# Patient Record
Sex: FEMALE | Race: WHITE | Employment: UNEMPLOYED | URBAN - METROPOLITAN AREA
[De-identification: names, ages, dates, MRNs, and addresses within clinical notes are randomized per-mention and may not be internally consistent; named-entity substitution may affect disease eponyms.]

---

## 2017-02-14 PROBLEM — C53.1 MALIGNANT NEOPLASM OF EXOCERVIX (HCC): Status: ACTIVE | Noted: 2017-02-14

## 2018-05-25 ENCOUNTER — HOSPITAL ENCOUNTER (OUTPATIENT)
Dept: PET IMAGING | Age: 58
Discharge: HOME | End: 2018-05-27
Payer: OTHER GOVERNMENT

## 2018-05-25 DIAGNOSIS — C53.1 MALIGNANT NEOPLASM OF EXOCERVIX (HCC): ICD-10-CM

## 2018-05-25 PROCEDURE — A9552 F18 FDG: HCPCS | Performed by: RADIOLOGY

## 2018-05-25 PROCEDURE — 3430000000 HC RX DIAGNOSTIC RADIOPHARMACEUTICAL: Performed by: RADIOLOGY

## 2018-05-25 RX ORDER — FLUDEOXYGLUCOSE F 18 200 MCI/ML
15 INJECTION, SOLUTION INTRAVENOUS
Status: COMPLETED | OUTPATIENT
Start: 2018-05-25 | End: 2018-05-25

## 2018-05-25 RX ADMIN — FLUDEOXYGLUCOSE F 18 15 MILLICURIE: 200 INJECTION, SOLUTION INTRAVENOUS at 09:28

## 2018-05-30 ENCOUNTER — HOSPITAL ENCOUNTER (OUTPATIENT)
Dept: PET IMAGING | Age: 58
Discharge: HOME | End: 2018-06-01
Payer: OTHER GOVERNMENT

## 2018-05-30 DIAGNOSIS — C53.9 MALIGNANT NEOPLASM OF CERVIX, UNSPECIFIED SITE (HCC): ICD-10-CM

## 2018-05-30 PROCEDURE — A9552 F18 FDG: HCPCS | Performed by: RADIOLOGY

## 2018-05-30 PROCEDURE — 3430000000 HC RX DIAGNOSTIC RADIOPHARMACEUTICAL: Performed by: RADIOLOGY

## 2018-05-30 PROCEDURE — 78815 PET IMAGE W/CT SKULL-THIGH: CPT

## 2018-05-30 RX ORDER — FLUDEOXYGLUCOSE F 18 200 MCI/ML
15 INJECTION, SOLUTION INTRAVENOUS
Status: COMPLETED | OUTPATIENT
Start: 2018-05-30 | End: 2018-05-30

## 2018-05-30 RX ADMIN — FLUDEOXYGLUCOSE F 18 15 MILLICURIE: 200 INJECTION, SOLUTION INTRAVENOUS at 09:10

## 2018-10-02 ENCOUNTER — HOSPITAL ENCOUNTER (EMERGENCY)
Age: 58
Discharge: HOME | End: 2018-10-02
Attending: EMERGENCY MEDICINE
Payer: OTHER GOVERNMENT

## 2018-10-02 VITALS
HEIGHT: 64 IN | TEMPERATURE: 98.6 F | OXYGEN SATURATION: 98 % | SYSTOLIC BLOOD PRESSURE: 137 MMHG | WEIGHT: 110 LBS | HEART RATE: 68 BPM | BODY MASS INDEX: 18.78 KG/M2 | DIASTOLIC BLOOD PRESSURE: 75 MMHG | RESPIRATION RATE: 14 BRPM

## 2018-10-02 DIAGNOSIS — C53.1 MALIGNANT NEOPLASM OF EXOCERVIX (HCC): ICD-10-CM

## 2018-10-02 DIAGNOSIS — G89.3 CANCER-RELATED PAIN: Primary | ICD-10-CM

## 2018-10-02 PROCEDURE — 99282 EMERGENCY DEPT VISIT SF MDM: CPT

## 2018-10-02 PROCEDURE — 96372 THER/PROPH/DIAG INJ SC/IM: CPT

## 2018-10-02 PROCEDURE — 6360000002 HC RX W HCPCS: Performed by: STUDENT IN AN ORGANIZED HEALTH CARE EDUCATION/TRAINING PROGRAM

## 2018-10-02 PROCEDURE — 94760 N-INVAS EAR/PLS OXIMETRY 1: CPT

## 2018-10-02 RX ORDER — HYDROCODONE BITARTRATE AND ACETAMINOPHEN 5; 325 MG/1; MG/1
1 TABLET ORAL EVERY 6 HOURS PRN
Qty: 12 TABLET | Refills: 0 | Status: SHIPPED | OUTPATIENT
Start: 2018-10-02 | End: 2018-10-05

## 2018-10-02 RX ADMIN — HYDROMORPHONE HYDROCHLORIDE 1 MG: 1 INJECTION, SOLUTION INTRAMUSCULAR; INTRAVENOUS; SUBCUTANEOUS at 18:13

## 2018-10-02 RX ADMIN — HYDROMORPHONE HYDROCHLORIDE 1 MG: 1 INJECTION, SOLUTION INTRAMUSCULAR; INTRAVENOUS; SUBCUTANEOUS at 16:25

## 2018-10-02 ASSESSMENT — ENCOUNTER SYMPTOMS
ABDOMINAL DISTENTION: 0
SINUS PRESSURE: 0
BLOOD IN STOOL: 0
SHORTNESS OF BREATH: 0
DIARRHEA: 0
COUGH: 0
EYE PAIN: 0
TROUBLE SWALLOWING: 0
EYE REDNESS: 0
CHEST TIGHTNESS: 0
WHEEZING: 0
PHOTOPHOBIA: 0
RHINORRHEA: 0
BACK PAIN: 1
ABDOMINAL PAIN: 1
CONSTIPATION: 0
NAUSEA: 0
VOMITING: 0
SORE THROAT: 0

## 2018-10-02 ASSESSMENT — PAIN SCALES - GENERAL: PAINLEVEL_OUTOF10: 10

## 2018-10-08 ENCOUNTER — HOSPITAL ENCOUNTER (OUTPATIENT)
Dept: PET IMAGING | Age: 58
Discharge: HOME | End: 2018-10-10
Payer: OTHER GOVERNMENT

## 2018-10-08 DIAGNOSIS — C53.1 CANCER OF EXOCERVIX (HCC): ICD-10-CM

## 2018-10-08 PROCEDURE — 78815 PET IMAGE W/CT SKULL-THIGH: CPT

## 2018-10-08 PROCEDURE — A9552 F18 FDG: HCPCS | Performed by: RADIOLOGY

## 2018-10-08 PROCEDURE — 3430000000 HC RX DIAGNOSTIC RADIOPHARMACEUTICAL: Performed by: RADIOLOGY

## 2018-10-08 PROCEDURE — 82962 GLUCOSE BLOOD TEST: CPT

## 2018-10-08 RX ORDER — FLUDEOXYGLUCOSE F 18 200 MCI/ML
15 INJECTION, SOLUTION INTRAVENOUS
Status: COMPLETED | OUTPATIENT
Start: 2018-10-08 | End: 2018-10-08

## 2018-10-08 RX ADMIN — FLUDEOXYGLUCOSE F 18 15 MILLICURIE: 200 INJECTION, SOLUTION INTRAVENOUS at 12:05

## 2018-10-09 LAB — METER GLUCOSE: 93 MG/DL (ref 70–110)

## 2018-11-02 RX ORDER — 0.9 % SODIUM CHLORIDE 0.9 %
250 INTRAVENOUS SOLUTION INTRAVENOUS ONCE
Status: CANCELLED | OUTPATIENT
Start: 2018-11-02 | End: 2018-11-02

## 2018-11-02 RX ORDER — SODIUM CHLORIDE 0.9 % (FLUSH) 0.9 %
10 SYRINGE (ML) INJECTION PRN
Status: CANCELLED | OUTPATIENT
Start: 2018-11-02

## 2018-11-06 ENCOUNTER — HOSPITAL ENCOUNTER (OUTPATIENT)
Dept: INFUSION THERAPY | Age: 58
Discharge: HOME | End: 2018-11-06
Payer: OTHER GOVERNMENT

## 2018-11-10 ENCOUNTER — APPOINTMENT (OUTPATIENT)
Dept: CT IMAGING | Age: 58
DRG: 698 | End: 2018-11-10
Payer: OTHER GOVERNMENT

## 2018-11-10 ENCOUNTER — HOSPITAL ENCOUNTER (INPATIENT)
Age: 58
LOS: 4 days | Discharge: ANOTHER HOSPITAL | DRG: 698 | End: 2018-11-14
Attending: EMERGENCY MEDICINE | Admitting: STUDENT IN AN ORGANIZED HEALTH CARE EDUCATION/TRAINING PROGRAM
Payer: OTHER GOVERNMENT

## 2018-11-10 DIAGNOSIS — N17.9 ACUTE RENAL FAILURE SUPERIMPOSED ON STAGE 4 CHRONIC KIDNEY DISEASE, UNSPECIFIED ACUTE RENAL FAILURE TYPE (HCC): ICD-10-CM

## 2018-11-10 DIAGNOSIS — K59.00 CONSTIPATION, UNSPECIFIED CONSTIPATION TYPE: ICD-10-CM

## 2018-11-10 DIAGNOSIS — D50.0 BLOOD LOSS ANEMIA: ICD-10-CM

## 2018-11-10 DIAGNOSIS — N18.4 ACUTE RENAL FAILURE SUPERIMPOSED ON STAGE 4 CHRONIC KIDNEY DISEASE, UNSPECIFIED ACUTE RENAL FAILURE TYPE (HCC): ICD-10-CM

## 2018-11-10 DIAGNOSIS — R10.30 LOWER ABDOMINAL PAIN: ICD-10-CM

## 2018-11-10 DIAGNOSIS — R31.0 GROSS HEMATURIA: Primary | ICD-10-CM

## 2018-11-10 PROBLEM — I10 HTN (HYPERTENSION): Status: ACTIVE | Noted: 2018-11-10

## 2018-11-10 PROBLEM — D62 ACUTE BLOOD LOSS ANEMIA: Status: ACTIVE | Noted: 2018-11-10

## 2018-11-10 PROBLEM — R31.9 HEMATURIA: Status: ACTIVE | Noted: 2018-11-10

## 2018-11-10 LAB
ABO/RH: NORMAL
ALBUMIN SERPL-MCNC: 3.1 G/DL (ref 3.5–5.2)
ALP BLD-CCNC: 54 U/L (ref 35–104)
ALT SERPL-CCNC: 7 U/L (ref 0–32)
ANION GAP SERPL CALCULATED.3IONS-SCNC: 13 MMOL/L (ref 7–16)
ANISOCYTOSIS: ABNORMAL
ANTIBODY IDENTIFICATION: NORMAL
ANTIBODY SCREEN: NORMAL
AST SERPL-CCNC: 13 U/L (ref 0–31)
BACTERIA: ABNORMAL /HPF
BASOPHILS ABSOLUTE: 0 E9/L (ref 0–0.2)
BASOPHILS RELATIVE PERCENT: 0.7 % (ref 0–2)
BILIRUB SERPL-MCNC: <0.2 MG/DL (ref 0–1.2)
BILIRUBIN URINE: NEGATIVE
BLOOD, URINE: ABNORMAL
BUN BLDV-MCNC: 19 MG/DL (ref 8–23)
CALCIUM SERPL-MCNC: 7.7 MG/DL (ref 8.6–10.2)
CHLORIDE BLD-SCNC: 106 MMOL/L (ref 98–107)
CLARITY: ABNORMAL
CO2: 22 MMOL/L (ref 22–29)
COLOR: ABNORMAL
CREAT SERPL-MCNC: 1.9 MG/DL (ref 0.5–1)
DAT POLYSPECIFIC: NORMAL
DR. NOTIFY: NORMAL
EOSINOPHILS ABSOLUTE: 0.12 E9/L (ref 0.05–0.5)
EOSINOPHILS RELATIVE PERCENT: 1.7 % (ref 0–6)
GFR AFRICAN AMERICAN: 33
GFR NON-AFRICAN AMERICAN: 27 ML/MIN/1.73
GLUCOSE BLD-MCNC: 97 MG/DL (ref 74–99)
GLUCOSE URINE: 100 MG/DL
HCT VFR BLD CALC: 21.6 % (ref 34–48)
HCT VFR BLD CALC: 23.9 % (ref 34–48)
HCT VFR BLD CALC: 24.5 % (ref 34–48)
HCT VFR BLD CALC: ABNORMAL % (ref 34–48)
HEMOGLOBIN: 5.5 G/DL (ref 11.5–15.5)
HEMOGLOBIN: 6.6 G/DL (ref 11.5–15.5)
HEMOGLOBIN: 7.3 G/DL (ref 11.5–15.5)
HEMOGLOBIN: 7.4 G/DL (ref 11.5–15.5)
HEMOGLOBIN: ABNORMAL G/DL (ref 11.5–15.5)
HYPOCHROMIA: ABNORMAL
KETONES, URINE: NEGATIVE MG/DL
LACTIC ACID: 0.7 MMOL/L (ref 0.5–2.2)
LACTIC ACID: 0.7 MMOL/L (ref 0.5–2.2)
LEUKOCYTE ESTERASE, URINE: NEGATIVE
LIPASE: 7 U/L (ref 13–60)
LYMPHOCYTES ABSOLUTE: 0.29 E9/L (ref 1.5–4)
LYMPHOCYTES RELATIVE PERCENT: 4.3 % (ref 20–42)
MCH RBC QN AUTO: 30.5 PG (ref 26–35)
MCHC RBC AUTO-ENTMCNC: 30.2 % (ref 32–34.5)
MCV RBC AUTO: 100.8 FL (ref 80–99.9)
MONOCYTES ABSOLUTE: 0.43 E9/L (ref 0.1–0.95)
MONOCYTES RELATIVE PERCENT: 6.1 % (ref 2–12)
NEUTROPHILS ABSOLUTE: 6.34 E9/L (ref 1.8–7.3)
NEUTROPHILS RELATIVE PERCENT: 87.8 % (ref 43–80)
NITRITE, URINE: NEGATIVE
OVALOCYTES: ABNORMAL
PDW BLD-RTO: 17.7 FL (ref 11.5–15)
PH UA: 7 (ref 5–9)
PLATELET # BLD: 233 E9/L (ref 130–450)
PMV BLD AUTO: 10.6 FL (ref 7–12)
POIKILOCYTES: ABNORMAL
POLYCHROMASIA: ABNORMAL
POTASSIUM SERPL-SCNC: 4.5 MMOL/L (ref 3.5–5)
PROTEIN UA: >=300 MG/DL
RBC # BLD: 2.43 E12/L (ref 3.5–5.5)
RBC UA: ABNORMAL /HPF (ref 0–2)
SODIUM BLD-SCNC: 141 MMOL/L (ref 132–146)
SPECIFIC GRAVITY UA: 1.02 (ref 1–1.03)
TOTAL PROTEIN: 5.8 G/DL (ref 6.4–8.3)
UROBILINOGEN, URINE: 0.2 E.U./DL
WBC # BLD: 7.2 E9/L (ref 4.5–11.5)
WBC UA: ABNORMAL /HPF (ref 0–5)

## 2018-11-10 PROCEDURE — 2580000003 HC RX 258: Performed by: STUDENT IN AN ORGANIZED HEALTH CARE EDUCATION/TRAINING PROGRAM

## 2018-11-10 PROCEDURE — 96374 THER/PROPH/DIAG INJ IV PUSH: CPT

## 2018-11-10 PROCEDURE — 6370000000 HC RX 637 (ALT 250 FOR IP): Performed by: STUDENT IN AN ORGANIZED HEALTH CARE EDUCATION/TRAINING PROGRAM

## 2018-11-10 PROCEDURE — 87040 BLOOD CULTURE FOR BACTERIA: CPT

## 2018-11-10 PROCEDURE — 85014 HEMATOCRIT: CPT

## 2018-11-10 PROCEDURE — 74176 CT ABD & PELVIS W/O CONTRAST: CPT

## 2018-11-10 PROCEDURE — 86900 BLOOD TYPING SEROLOGIC ABO: CPT

## 2018-11-10 PROCEDURE — 6360000002 HC RX W HCPCS: Performed by: EMERGENCY MEDICINE

## 2018-11-10 PROCEDURE — 2140000000 HC CCU INTERMEDIATE R&B

## 2018-11-10 PROCEDURE — 87088 URINE BACTERIA CULTURE: CPT

## 2018-11-10 PROCEDURE — 86922 COMPATIBILITY TEST ANTIGLOB: CPT

## 2018-11-10 PROCEDURE — 86880 COOMBS TEST DIRECT: CPT

## 2018-11-10 PROCEDURE — 6360000002 HC RX W HCPCS: Performed by: STUDENT IN AN ORGANIZED HEALTH CARE EDUCATION/TRAINING PROGRAM

## 2018-11-10 PROCEDURE — 2580000003 HC RX 258: Performed by: EMERGENCY MEDICINE

## 2018-11-10 PROCEDURE — 87205 SMEAR GRAM STAIN: CPT

## 2018-11-10 PROCEDURE — 36415 COLL VENOUS BLD VENIPUNCTURE: CPT

## 2018-11-10 PROCEDURE — 86870 RBC ANTIBODY IDENTIFICATION: CPT

## 2018-11-10 PROCEDURE — 85018 HEMOGLOBIN: CPT

## 2018-11-10 PROCEDURE — 85025 COMPLETE CBC W/AUTO DIFF WBC: CPT

## 2018-11-10 PROCEDURE — 86850 RBC ANTIBODY SCREEN: CPT

## 2018-11-10 PROCEDURE — 86901 BLOOD TYPING SEROLOGIC RH(D): CPT

## 2018-11-10 PROCEDURE — 83690 ASSAY OF LIPASE: CPT

## 2018-11-10 PROCEDURE — 81001 URINALYSIS AUTO W/SCOPE: CPT

## 2018-11-10 PROCEDURE — 80053 COMPREHEN METABOLIC PANEL: CPT

## 2018-11-10 PROCEDURE — 6360000002 HC RX W HCPCS

## 2018-11-10 PROCEDURE — 83605 ASSAY OF LACTIC ACID: CPT

## 2018-11-10 PROCEDURE — 99285 EMERGENCY DEPT VISIT HI MDM: CPT

## 2018-11-10 PROCEDURE — 87070 CULTURE OTHR SPECIMN AEROBIC: CPT

## 2018-11-10 RX ORDER — OXYCODONE HYDROCHLORIDE AND ACETAMINOPHEN 5; 325 MG/1; MG/1
1 TABLET ORAL ONCE
Status: COMPLETED | OUTPATIENT
Start: 2018-11-10 | End: 2018-11-10

## 2018-11-10 RX ORDER — SODIUM CHLORIDE 0.9 % (FLUSH) 0.9 %
10 SYRINGE (ML) INJECTION EVERY 12 HOURS SCHEDULED
Status: DISCONTINUED | OUTPATIENT
Start: 2018-11-10 | End: 2018-11-14 | Stop reason: HOSPADM

## 2018-11-10 RX ORDER — DEXTROSE AND SODIUM CHLORIDE 5; .45 G/100ML; G/100ML
INJECTION, SOLUTION INTRAVENOUS CONTINUOUS
Status: DISCONTINUED | OUTPATIENT
Start: 2018-11-10 | End: 2018-11-14 | Stop reason: HOSPADM

## 2018-11-10 RX ORDER — ONDANSETRON 2 MG/ML
4 INJECTION INTRAMUSCULAR; INTRAVENOUS EVERY 6 HOURS PRN
Status: DISCONTINUED | OUTPATIENT
Start: 2018-11-10 | End: 2018-11-14 | Stop reason: HOSPADM

## 2018-11-10 RX ORDER — AMLODIPINE BESYLATE 5 MG/1
5 TABLET ORAL DAILY
Status: DISCONTINUED | OUTPATIENT
Start: 2018-11-10 | End: 2018-11-14 | Stop reason: HOSPADM

## 2018-11-10 RX ORDER — AMOXICILLIN AND CLAVULANATE POTASSIUM 875; 125 MG/1; MG/1
1 TABLET, FILM COATED ORAL 2 TIMES DAILY
Status: ON HOLD | COMMUNITY
Start: 2018-11-08 | End: 2018-11-13 | Stop reason: HOSPADM

## 2018-11-10 RX ORDER — FENTANYL 100 UG/H
1 PATCH TRANSDERMAL
COMMUNITY

## 2018-11-10 RX ORDER — HYDRALAZINE HYDROCHLORIDE 20 MG/ML
10 INJECTION INTRAMUSCULAR; INTRAVENOUS EVERY 6 HOURS PRN
Status: DISCONTINUED | OUTPATIENT
Start: 2018-11-10 | End: 2018-11-14 | Stop reason: HOSPADM

## 2018-11-10 RX ORDER — 0.9 % SODIUM CHLORIDE 0.9 %
250 INTRAVENOUS SOLUTION INTRAVENOUS ONCE
Status: DISCONTINUED | OUTPATIENT
Start: 2018-11-11 | End: 2018-11-14 | Stop reason: HOSPADM

## 2018-11-10 RX ORDER — DOCUSATE SODIUM 100 MG/1
100 CAPSULE, LIQUID FILLED ORAL 2 TIMES DAILY
Status: DISCONTINUED | OUTPATIENT
Start: 2018-11-10 | End: 2018-11-14 | Stop reason: HOSPADM

## 2018-11-10 RX ORDER — SODIUM CHLORIDE 0.9 % (FLUSH) 0.9 %
10 SYRINGE (ML) INJECTION PRN
Status: DISCONTINUED | OUTPATIENT
Start: 2018-11-10 | End: 2018-11-14 | Stop reason: HOSPADM

## 2018-11-10 RX ORDER — METHADONE HYDROCHLORIDE 10 MG/1
10 TABLET ORAL EVERY 8 HOURS PRN
COMMUNITY

## 2018-11-10 RX ORDER — GABAPENTIN 600 MG/1
1 TABLET ORAL 3 TIMES DAILY
COMMUNITY
Start: 2018-10-18

## 2018-11-10 RX ORDER — SODIUM CHLORIDE 9 MG/ML
INJECTION, SOLUTION INTRAVENOUS CONTINUOUS
Status: DISCONTINUED | OUTPATIENT
Start: 2018-11-10 | End: 2018-11-13

## 2018-11-10 RX ORDER — 0.9 % SODIUM CHLORIDE 0.9 %
1000 INTRAVENOUS SOLUTION INTRAVENOUS ONCE
Status: COMPLETED | OUTPATIENT
Start: 2018-11-10 | End: 2018-11-10

## 2018-11-10 RX ORDER — SODIUM CHLORIDE 9 MG/ML
INJECTION, SOLUTION INTRAVENOUS CONTINUOUS
Status: CANCELLED | OUTPATIENT
Start: 2018-11-10

## 2018-11-10 RX ORDER — MORPHINE SULFATE 2 MG/ML
2 INJECTION, SOLUTION INTRAMUSCULAR; INTRAVENOUS EVERY 4 HOURS PRN
Status: DISCONTINUED | OUTPATIENT
Start: 2018-11-10 | End: 2018-11-11

## 2018-11-10 RX ORDER — FERRIC SUBSULFATE 0.21 G/G
LIQUID TOPICAL ONCE
Status: DISCONTINUED | OUTPATIENT
Start: 2018-11-10 | End: 2018-11-14 | Stop reason: HOSPADM

## 2018-11-10 RX ADMIN — Medication 0.5 MG: at 23:15

## 2018-11-10 RX ADMIN — DEXTROSE AND SODIUM CHLORIDE: 5; 450 INJECTION, SOLUTION INTRAVENOUS at 15:41

## 2018-11-10 RX ADMIN — CEFEPIME 1 G: 1 INJECTION, POWDER, FOR SOLUTION INTRAMUSCULAR; INTRAVENOUS at 17:07

## 2018-11-10 RX ADMIN — HYDROMORPHONE HYDROCHLORIDE 0.5 MG: 1 INJECTION, SOLUTION INTRAMUSCULAR; INTRAVENOUS; SUBCUTANEOUS at 08:32

## 2018-11-10 RX ADMIN — SODIUM CHLORIDE 1000 ML: 9 INJECTION, SOLUTION INTRAVENOUS at 08:31

## 2018-11-10 RX ADMIN — OXYCODONE AND ACETAMINOPHEN 1 TABLET: 5; 325 TABLET ORAL at 18:52

## 2018-11-10 RX ADMIN — AMLODIPINE BESYLATE 5 MG: 5 TABLET ORAL at 17:07

## 2018-11-10 RX ADMIN — HYDROMORPHONE HYDROCHLORIDE 0.5 MG: 1 INJECTION, SOLUTION INTRAMUSCULAR; INTRAVENOUS; SUBCUTANEOUS at 13:36

## 2018-11-10 RX ADMIN — HYDROMORPHONE HYDROCHLORIDE 0.5 MG: 1 INJECTION, SOLUTION INTRAMUSCULAR; INTRAVENOUS; SUBCUTANEOUS at 23:15

## 2018-11-10 RX ADMIN — VANCOMYCIN HYDROCHLORIDE 1000 MG: 1 INJECTION, POWDER, LYOPHILIZED, FOR SOLUTION INTRAVENOUS at 18:11

## 2018-11-10 RX ADMIN — MORPHINE SULFATE 2 MG: 2 INJECTION, SOLUTION INTRAMUSCULAR; INTRAVENOUS at 15:27

## 2018-11-10 RX ADMIN — SODIUM CHLORIDE: 9 INJECTION, SOLUTION INTRAVENOUS at 10:00

## 2018-11-10 RX ADMIN — MORPHINE SULFATE 2 MG: 2 INJECTION, SOLUTION INTRAMUSCULAR; INTRAVENOUS at 20:42

## 2018-11-10 RX ADMIN — DEXTROSE AND SODIUM CHLORIDE: 5; 450 INJECTION, SOLUTION INTRAVENOUS at 20:42

## 2018-11-10 ASSESSMENT — ENCOUNTER SYMPTOMS
VOMITING: 0
ABDOMINAL PAIN: 1
NAUSEA: 0
BACK PAIN: 0
COUGH: 0
DIARRHEA: 0
CHEST TIGHTNESS: 0
SHORTNESS OF BREATH: 0
COLOR CHANGE: 0
BLOOD IN STOOL: 0

## 2018-11-10 ASSESSMENT — PAIN SCALES - GENERAL
PAINLEVEL_OUTOF10: 8
PAINLEVEL_OUTOF10: 7
PAINLEVEL_OUTOF10: 10
PAINLEVEL_OUTOF10: 0
PAINLEVEL_OUTOF10: 10

## 2018-11-10 ASSESSMENT — PAIN DESCRIPTION - PROGRESSION
CLINICAL_PROGRESSION: GRADUALLY WORSENING

## 2018-11-10 ASSESSMENT — PAIN DESCRIPTION - PAIN TYPE
TYPE: ACUTE PAIN
TYPE: SURGICAL PAIN
TYPE: SURGICAL PAIN
TYPE: ACUTE PAIN

## 2018-11-10 ASSESSMENT — PAIN DESCRIPTION - ORIENTATION
ORIENTATION: MID;LOWER
ORIENTATION: LOWER;MID

## 2018-11-10 ASSESSMENT — PAIN DESCRIPTION - FREQUENCY
FREQUENCY: CONTINUOUS

## 2018-11-10 ASSESSMENT — PAIN DESCRIPTION - LOCATION
LOCATION: ABDOMEN
LOCATION: PERINEUM
LOCATION: PERINEUM
LOCATION: ABDOMEN;BACK

## 2018-11-10 ASSESSMENT — PAIN DESCRIPTION - DESCRIPTORS
DESCRIPTORS: DISCOMFORT;CRAMPING;PRESSURE

## 2018-11-10 ASSESSMENT — PAIN DESCRIPTION - ONSET
ONSET: ON-GOING

## 2018-11-11 ENCOUNTER — APPOINTMENT (OUTPATIENT)
Dept: ULTRASOUND IMAGING | Age: 58
DRG: 698 | End: 2018-11-11
Payer: OTHER GOVERNMENT

## 2018-11-11 LAB
ANION GAP SERPL CALCULATED.3IONS-SCNC: 15 MMOL/L (ref 7–16)
APTT: 24.7 SEC (ref 24.5–35.1)
BUN BLDV-MCNC: 16 MG/DL (ref 8–23)
CALCIUM SERPL-MCNC: 7.2 MG/DL (ref 8.6–10.2)
CHLORIDE BLD-SCNC: 110 MMOL/L (ref 98–107)
CO2: 19 MMOL/L (ref 22–29)
CREAT SERPL-MCNC: 1.6 MG/DL (ref 0.5–1)
GFR AFRICAN AMERICAN: 40
GFR NON-AFRICAN AMERICAN: 33 ML/MIN/1.73
GLUCOSE BLD-MCNC: 123 MG/DL (ref 74–99)
HEMOGLOBIN: 8.2 G/DL (ref 11.5–15.5)
HEMOGLOBIN: 9 G/DL (ref 11.5–15.5)
INR BLD: 1.1
POTASSIUM REFLEX MAGNESIUM: 3.9 MMOL/L (ref 3.5–5)
PROTHROMBIN TIME: 12.6 SEC (ref 9.3–12.4)
SODIUM BLD-SCNC: 144 MMOL/L (ref 132–146)
VANCOMYCIN RANDOM: <4 MCG/ML (ref 5–40)

## 2018-11-11 PROCEDURE — 85018 HEMOGLOBIN: CPT

## 2018-11-11 PROCEDURE — 6360000002 HC RX W HCPCS: Performed by: INTERNAL MEDICINE

## 2018-11-11 PROCEDURE — 36415 COLL VENOUS BLD VENIPUNCTURE: CPT

## 2018-11-11 PROCEDURE — 99223 1ST HOSP IP/OBS HIGH 75: CPT | Performed by: NURSE PRACTITIONER

## 2018-11-11 PROCEDURE — 6370000000 HC RX 637 (ALT 250 FOR IP): Performed by: UROLOGY

## 2018-11-11 PROCEDURE — 85730 THROMBOPLASTIN TIME PARTIAL: CPT

## 2018-11-11 PROCEDURE — 6370000000 HC RX 637 (ALT 250 FOR IP): Performed by: STUDENT IN AN ORGANIZED HEALTH CARE EDUCATION/TRAINING PROGRAM

## 2018-11-11 PROCEDURE — P9016 RBC LEUKOCYTES REDUCED: HCPCS

## 2018-11-11 PROCEDURE — 6360000002 HC RX W HCPCS: Performed by: STUDENT IN AN ORGANIZED HEALTH CARE EDUCATION/TRAINING PROGRAM

## 2018-11-11 PROCEDURE — 85610 PROTHROMBIN TIME: CPT

## 2018-11-11 PROCEDURE — 2580000003 HC RX 258: Performed by: INTERNAL MEDICINE

## 2018-11-11 PROCEDURE — 80048 BASIC METABOLIC PNL TOTAL CA: CPT

## 2018-11-11 PROCEDURE — 80202 ASSAY OF VANCOMYCIN: CPT

## 2018-11-11 PROCEDURE — 88108 CYTOPATH CONCENTRATE TECH: CPT

## 2018-11-11 PROCEDURE — 2140000000 HC CCU INTERMEDIATE R&B

## 2018-11-11 PROCEDURE — 36430 TRANSFUSION BLD/BLD COMPNT: CPT

## 2018-11-11 PROCEDURE — 93970 EXTREMITY STUDY: CPT

## 2018-11-11 PROCEDURE — 2580000003 HC RX 258: Performed by: STUDENT IN AN ORGANIZED HEALTH CARE EDUCATION/TRAINING PROGRAM

## 2018-11-11 RX ORDER — ATROPA BELLADONNA AND OPIUM 16.2; 6 MG/1; MG/1
60 SUPPOSITORY RECTAL EVERY 8 HOURS PRN
Status: DISCONTINUED | OUTPATIENT
Start: 2018-11-11 | End: 2018-11-14 | Stop reason: HOSPADM

## 2018-11-11 RX ORDER — ATROPA BELLADONNA AND OPIUM 16.2; 3 MG/1; MG/1
30 SUPPOSITORY RECTAL EVERY 8 HOURS PRN
Status: DISCONTINUED | OUTPATIENT
Start: 2018-11-11 | End: 2018-11-11 | Stop reason: SDUPTHER

## 2018-11-11 RX ORDER — 0.9 % SODIUM CHLORIDE 0.9 %
500 INTRAVENOUS SOLUTION INTRAVENOUS ONCE
Status: COMPLETED | OUTPATIENT
Start: 2018-11-11 | End: 2018-11-11

## 2018-11-11 RX ADMIN — HYDROMORPHONE HYDROCHLORIDE 1 MG: 1 INJECTION, SOLUTION INTRAMUSCULAR; INTRAVENOUS; SUBCUTANEOUS at 00:12

## 2018-11-11 RX ADMIN — ONDANSETRON 4 MG: 2 INJECTION INTRAMUSCULAR; INTRAVENOUS at 14:36

## 2018-11-11 RX ADMIN — HYDROMORPHONE HYDROCHLORIDE 1 MG: 1 INJECTION, SOLUTION INTRAMUSCULAR; INTRAVENOUS; SUBCUTANEOUS at 18:58

## 2018-11-11 RX ADMIN — CEFEPIME 1 G: 1 INJECTION, POWDER, FOR SOLUTION INTRAMUSCULAR; INTRAVENOUS at 04:55

## 2018-11-11 RX ADMIN — Medication 10 ML: at 20:22

## 2018-11-11 RX ADMIN — HYDROMORPHONE HYDROCHLORIDE 1 MG: 1 INJECTION, SOLUTION INTRAMUSCULAR; INTRAVENOUS; SUBCUTANEOUS at 23:13

## 2018-11-11 RX ADMIN — HYDROMORPHONE HYDROCHLORIDE 1 MG: 1 INJECTION, SOLUTION INTRAMUSCULAR; INTRAVENOUS; SUBCUTANEOUS at 16:59

## 2018-11-11 RX ADMIN — HYDROMORPHONE HYDROCHLORIDE 1 MG: 1 INJECTION, SOLUTION INTRAMUSCULAR; INTRAVENOUS; SUBCUTANEOUS at 12:19

## 2018-11-11 RX ADMIN — CEFEPIME 1 G: 1 INJECTION, POWDER, FOR SOLUTION INTRAMUSCULAR; INTRAVENOUS at 14:36

## 2018-11-11 RX ADMIN — ONDANSETRON 4 MG: 2 INJECTION INTRAMUSCULAR; INTRAVENOUS at 21:51

## 2018-11-11 RX ADMIN — HYDROMORPHONE HYDROCHLORIDE 1 MG: 1 INJECTION, SOLUTION INTRAMUSCULAR; INTRAVENOUS; SUBCUTANEOUS at 14:42

## 2018-11-11 RX ADMIN — HYDROMORPHONE HYDROCHLORIDE 1 MG: 1 INJECTION, SOLUTION INTRAMUSCULAR; INTRAVENOUS; SUBCUTANEOUS at 03:16

## 2018-11-11 RX ADMIN — AMLODIPINE BESYLATE 5 MG: 5 TABLET ORAL at 08:11

## 2018-11-11 RX ADMIN — HYDROMORPHONE HYDROCHLORIDE 1 MG: 1 INJECTION, SOLUTION INTRAMUSCULAR; INTRAVENOUS; SUBCUTANEOUS at 08:11

## 2018-11-11 RX ADMIN — Medication 10 ML: at 08:11

## 2018-11-11 RX ADMIN — ATROPA BELLADONNA AND OPIUM 60 MG: 16.2; 6 SUPPOSITORY RECTAL at 14:30

## 2018-11-11 RX ADMIN — HYDROMORPHONE HYDROCHLORIDE 1 MG: 1 INJECTION, SOLUTION INTRAMUSCULAR; INTRAVENOUS; SUBCUTANEOUS at 21:41

## 2018-11-11 RX ADMIN — DEXTROSE AND SODIUM CHLORIDE: 5; 450 INJECTION, SOLUTION INTRAVENOUS at 04:59

## 2018-11-11 RX ADMIN — SODIUM CHLORIDE 500 ML: 9 INJECTION, SOLUTION INTRAVENOUS at 00:20

## 2018-11-11 ASSESSMENT — PAIN DESCRIPTION - FREQUENCY
FREQUENCY: CONTINUOUS
FREQUENCY: CONTINUOUS

## 2018-11-11 ASSESSMENT — PAIN SCALES - GENERAL
PAINLEVEL_OUTOF10: 5
PAINLEVEL_OUTOF10: 0
PAINLEVEL_OUTOF10: 10
PAINLEVEL_OUTOF10: 5
PAINLEVEL_OUTOF10: 10
PAINLEVEL_OUTOF10: 10
PAINLEVEL_OUTOF10: 0
PAINLEVEL_OUTOF10: 10
PAINLEVEL_OUTOF10: 9
PAINLEVEL_OUTOF10: 10
PAINLEVEL_OUTOF10: 9
PAINLEVEL_OUTOF10: 9
PAINLEVEL_OUTOF10: 10
PAINLEVEL_OUTOF10: 0
PAINLEVEL_OUTOF10: 0
PAINLEVEL_OUTOF10: 8

## 2018-11-11 ASSESSMENT — PAIN DESCRIPTION - PAIN TYPE
TYPE: ACUTE PAIN
TYPE: ACUTE PAIN
TYPE: SURGICAL PAIN
TYPE: SURGICAL PAIN

## 2018-11-11 ASSESSMENT — PAIN DESCRIPTION - LOCATION
LOCATION: PERINEUM
LOCATION: PERINEUM;BACK
LOCATION: PERINEUM
LOCATION: PERINEUM;BACK

## 2018-11-11 ASSESSMENT — PAIN DESCRIPTION - DESCRIPTORS
DESCRIPTORS: ACHING;BURNING;CONSTANT;CRAMPING

## 2018-11-11 ASSESSMENT — PAIN DESCRIPTION - ORIENTATION
ORIENTATION: LOWER;MID

## 2018-11-11 ASSESSMENT — PAIN DESCRIPTION - PROGRESSION
CLINICAL_PROGRESSION: GRADUALLY WORSENING
CLINICAL_PROGRESSION: GRADUALLY WORSENING

## 2018-11-11 ASSESSMENT — PAIN DESCRIPTION - ONSET
ONSET: ON-GOING
ONSET: ON-GOING

## 2018-11-11 NOTE — CONSULTS
11/11/18 1436    dextrose 5 % and 0.45 % sodium chloride infusion   Intravenous Continuous Audra Vásquez  mL/hr at 11/11/18 0459      0.9 % sodium chloride bolus  250 mL Intravenous Once Hansa Appiah MD           Allergies   Allergen Reactions    Latex Shortness Of Breath    Lactose Intolerance (Gi) Anaphylaxis    Other Shortness Of Breath     Seafood    Asa [Aspirin]     Contrast [Barium-Containing Compounds] Nausea And Vomiting        Social History     Social History    Marital status:      Spouse name: N/A    Number of children: N/A    Years of education: N/A     Occupational History    Not on file. Social History Main Topics    Smoking status: Current Every Day Smoker     Packs/day: 1.00     Years: 40.00     Types: Cigarettes    Smokeless tobacco: Never Used    Alcohol use No      Comment: 1 x a month    Drug use: Yes     Types: Marijuana      Comment: couple times a month    Sexual activity: Not on file     Other Topics Concern    Not on file     Social History Narrative    No narrative on file       History reviewed. No pertinent family history. Review of Systems  Constitutional:No Fever, +chills / rigors. No unexplained weight loss. HEENT: No headache, dizziness or lightheadedness. No recent change in vision or hearing. No sore throat or runny nose. Respiratory: + cough, chest pain, +shortness of breath   Cardiovascular: no chest pain or palpitations  Gastrointestinal: no nausea, vomiting, diarrhea, constipation. No blood in stool. Genitourinary: + h/o dysuria, +hematuria,+ urgency,+ frequency   Musculoskeletal: No h/o joint pain   Neurologic: No h/o passing out, focal weakness or seizure. Skin: No h/o rashes or easy bruising. Hematologic: No gum bleeding or easy bruising. No hemoptysis.       Vitals:    11/11/18 0116 11/11/18 0500 11/11/18 0630 11/11/18 0807   BP: 123/64 (!) 144/74  (!) 148/84   Pulse: 80 70  90   Resp: 20 17  14   Temp: 98.5 °F (36.9 °C) 97.6 °F

## 2018-11-11 NOTE — PLAN OF CARE
Problem: Pain:  Goal: Pain level will decrease  Pain level will decrease   Outcome: Ongoing      Problem: Risk for Impaired Skin Integrity  Goal: Tissue integrity - skin and mucous membranes  Structural intactness and normal physiological function of skin and  mucous membranes.    Outcome: Met This Shift      Problem: Falls - Risk of:  Goal: Will remain free from falls  Will remain free from falls   Outcome: Met This Shift

## 2018-11-11 NOTE — CONSULTS
Inpatient consult to Urology  Consult performed by: Jb Gonzalez ordered by: formerly [facility], [address]  11/11/2018      [facility]  [address] [place], [address] [facility]  [phone]        REASON FOR CONSULTATION:  Gross hematuria/Bilateral hydronephrosis/Retained left ureteral stent      HISTORY OF PRESENT ILLNESS:      The patient is a 59 y.o. female patient who was admitted with pain in reports of vaginal bleeding. She currently denies vaginal bleeding however. She was recently admitted to [facility] for a week and is mostly followed by Dr. Annalisa Price from [facility]. She apparently had ureteral stents exchanged during that time. By Dr. Caroline Swain. She reports the right ureteral stent has since fallen out. She was diagnosed with advanced stage cervical cancer a year ago and has had multiple ureteral stents since that time. These have fallen out on occasion. She has diffuse abdominal pain. She had a Estrella placed and has been having gross hematuria for some time. She has been passing clots. A CT scan of her abdomen and pelvis was performed which I reviewed. There is debris within the bladder as well as a well-positioned left ureteral stent. There is no evidence of her right ureteral stent. There is bilateral moderate hydronephrosis without evidence of the stone. She denies nausea, vomiting, fevers, chills. Urine cytology is pending.  Urine culture is negative for infection      Past Medical History:   Diagnosis Date    COPD (chronic obstructive pulmonary disease) ([facility] [address]     Hyperlipidemia     Hypertension     PMB (postmenopausal bleeding)     Squamous cell carcinoma 02/06/2017    Invasive non-keratinizing squamous cell carcinoma with extensive squamous cell/severe dysplasia LAY 3   Tobacco abuse      Past Surgical History:   Procedure Laterality Date    DILATION AND CURETTAGE OF UTERUS      HERNIA REPAIR      constipation  Dermatologic: denies any rashes, skin lesion(s), or pruritis  Neurological: negative for headaches, seizures, and tremors  Endocrine: negative for diabetic symptoms including polydipsia and polyuria or thyroid dysfunction  Ocular: denies retinopathy or glaucoma  ENT: denies sinusitis or epistaxis  Constitutional: denies nausea, vomiting, fever, or chills  Psychiatric: denies anxiety or depression  : denies any stones or frequent UTIs    PHYSICAL EXAM:    Vitals:  BP (!) 148/84   Pulse 90   Temp 97.9 °F (36.6 °C) (Oral)   Resp 14   Ht 5' 5\" (1.651 m)   Wt 123 lb 6.4 oz (56 kg)   SpO2 96%   BMI 20.53 kg/m²     General:  Awake, alert, oriented X 3. Well developed, well nourished, well groomed. No apparent distress. Thin. HEENT:  Normocephalic, atraumatic. Pupils equal, round. No scleral icterus. No conjunctival injection. Normal lips, teeth, and gums. No nasal discharge. Neck:  Supple, no masses. Heart:  RRR. Lungs:  No audible wheezing. Respirations symmetric and non-labored. Clear bilaterally. Abdomen:  Soft, nontender, no masses, no organomegaly, no peritoneal signs. Active bowel sounds. No rebound or guarding. No flank or CVA tenderness. Extremities:  No clubbing, cyanosis, or edema. Brisk peripheral pulses. Skin:  Warm and dry, no open lesions or rashes. Neuro:  Cranial nerves 2-12 intact, no focal motor or sensory deficits. Alert and oriented. Rectal: Deferred   Genitalia:  Normal female external genitalia. See no evidence of vaginal bleeding. Limited vaginal exam does not demonstrate a mass.  She has a 24-Yoruba three-way Estrella catheter in position draining bloody urine with several clots    LABS:    Lab Results   Component Value Date    WBC 7.2 11/10/2018    HGB 8.2 (L) 11/11/2018    HCT 21.6 (L) 11/10/2018    .8 (H) 11/10/2018     11/10/2018       Lab Results   Component Value Date    BUN 16 11/11/2018    CREATININE 1.6 (H) 11/11/2018       ASSESSMENT

## 2018-11-11 NOTE — CONSULTS
Palliative Care Department  Palliative Care Initial Consult  [facility]    H[facility] Day: 2    Referring Provider:  Chiquita Crabtree MD  Reason for Consult:  [x]  Code status Discussion  [x]  Assist with goals of care  []  Psychosocial support  []  Symptom Management  []  Advanced Care Planning    Chief Complaint: Seamus Crawford is a 59 y.o. female with chief complaint of vaginal bleeding    Assessment/Plan      Active Hospital Problems    Diagnosis Date Noted    Hematuria [R31.9] 11/10/2018    HTN (hypertension) [I10] 11/10/2018    Acute blood loss anemia [D62] 11/10/2018    Acute renal failure superimposed on stage 4 chronic kidney disease ([facility] [address] [N17.9, N18.4] 11/10/2018    Malignant neoplasm of exocervix ([facility] [address] [C53.1] 02/14/2017   hydronephrosis    Further discussions with patient regarding goals of care and code status to occur as patient's     recommend restarting her methadone as prescribed by dr Lewis Lomeli, we can assist with this if needed    Palliative Care Encounter/Recommendations:      - Goals of care: continue current management and to be determined     - Code Status: full code               Subjective:     HPI:  Seamus Crawford is a 59 y.o. female with significant past medical history of COPD and stage 4 cervical cancer undergoing chemo navelbine and following with the [facility] who presented with vaginal bleeding. She was discharged from [facility] 2 days ago. She also had abdominal pain and hematuria. Her HGB was 5.5 on admission. ABD/pelvis ct showing b/l hydronephrosis, left ureteral stent likely nonfunctional and constipation. Oncology recommends possible IR for  embolization of bleeding She has been seen in our outpatient clinic but has not been seen since feb 2018. Subjective/Events/Discussions:  She is having unmanaged abdominal pain 8/10. Per RN she will contact attending for medication adjustments.  She also reports poor appetie,

## 2018-11-12 ENCOUNTER — ANESTHESIA (OUTPATIENT)
Dept: INPATIENT UNIT | Age: 58
DRG: 698 | End: 2018-11-12
Payer: OTHER GOVERNMENT

## 2018-11-12 ENCOUNTER — ANESTHESIA EVENT (OUTPATIENT)
Dept: INPATIENT UNIT | Age: 58
DRG: 698 | End: 2018-11-12
Payer: OTHER GOVERNMENT

## 2018-11-12 ENCOUNTER — APPOINTMENT (OUTPATIENT)
Dept: CT IMAGING | Age: 58
DRG: 698 | End: 2018-11-12
Payer: OTHER GOVERNMENT

## 2018-11-12 VITALS — OXYGEN SATURATION: 100 % | DIASTOLIC BLOOD PRESSURE: 60 MMHG | SYSTOLIC BLOOD PRESSURE: 128 MMHG

## 2018-11-12 LAB
ANION GAP SERPL CALCULATED.3IONS-SCNC: 15 MMOL/L (ref 7–16)
BUN BLDV-MCNC: 12 MG/DL (ref 8–23)
CALCIUM SERPL-MCNC: 6.8 MG/DL (ref 8.6–10.2)
CHLORIDE BLD-SCNC: 108 MMOL/L (ref 98–107)
CO2: 19 MMOL/L (ref 22–29)
CREAT SERPL-MCNC: 1.7 MG/DL (ref 0.5–1)
CULTURE, RESPIRATORY: ABNORMAL
CULTURE, RESPIRATORY: ABNORMAL
GFR AFRICAN AMERICAN: 37
GFR NON-AFRICAN AMERICAN: 31 ML/MIN/1.73
GLUCOSE BLD-MCNC: 112 MG/DL (ref 74–99)
HEMOGLOBIN: 7.6 G/DL (ref 11.5–15.5)
HEMOGLOBIN: 8.7 G/DL (ref 11.5–15.5)
ORGANISM: ABNORMAL
POTASSIUM SERPL-SCNC: 3.8 MMOL/L (ref 3.5–5)
SMEAR, RESPIRATORY: ABNORMAL
SODIUM BLD-SCNC: 142 MMOL/L (ref 132–146)
URINE CULTURE, ROUTINE: NORMAL

## 2018-11-12 PROCEDURE — 0T903ZX DRAINAGE OF RIGHT KIDNEY, PERCUTANEOUS APPROACH, DIAGNOSTIC: ICD-10-PCS | Performed by: RADIOLOGY

## 2018-11-12 PROCEDURE — 6360000002 HC RX W HCPCS: Performed by: RADIOLOGY

## 2018-11-12 PROCEDURE — 3700000000 HC ANESTHESIA ATTENDED CARE

## 2018-11-12 PROCEDURE — 2580000003 HC RX 258: Performed by: EMERGENCY MEDICINE

## 2018-11-12 PROCEDURE — 6370000000 HC RX 637 (ALT 250 FOR IP): Performed by: STUDENT IN AN ORGANIZED HEALTH CARE EDUCATION/TRAINING PROGRAM

## 2018-11-12 PROCEDURE — 6360000002 HC RX W HCPCS: Performed by: STUDENT IN AN ORGANIZED HEALTH CARE EDUCATION/TRAINING PROGRAM

## 2018-11-12 PROCEDURE — 6370000000 HC RX 637 (ALT 250 FOR IP): Performed by: SPECIALIST

## 2018-11-12 PROCEDURE — 2140000000 HC CCU INTERMEDIATE R&B

## 2018-11-12 PROCEDURE — 6360000004 HC RX CONTRAST MEDICATION: Performed by: RADIOLOGY

## 2018-11-12 PROCEDURE — 6370000000 HC RX 637 (ALT 250 FOR IP): Performed by: EMERGENCY MEDICINE

## 2018-11-12 PROCEDURE — 85018 HEMOGLOBIN: CPT

## 2018-11-12 PROCEDURE — 2500000003 HC RX 250 WO HCPCS: Performed by: RADIOLOGY

## 2018-11-12 PROCEDURE — 3700000001 HC ADD 15 MINUTES (ANESTHESIA)

## 2018-11-12 PROCEDURE — 2709999900 CT NEPHROSTOMY CATHETER PLACEMENT

## 2018-11-12 PROCEDURE — 6360000002 HC RX W HCPCS

## 2018-11-12 PROCEDURE — 36415 COLL VENOUS BLD VENIPUNCTURE: CPT

## 2018-11-12 PROCEDURE — 80048 BASIC METABOLIC PNL TOTAL CA: CPT

## 2018-11-12 PROCEDURE — 2580000003 HC RX 258: Performed by: STUDENT IN AN ORGANIZED HEALTH CARE EDUCATION/TRAINING PROGRAM

## 2018-11-12 PROCEDURE — 99233 SBSQ HOSP IP/OBS HIGH 50: CPT | Performed by: EMERGENCY MEDICINE

## 2018-11-12 RX ORDER — MIDAZOLAM HYDROCHLORIDE 1 MG/ML
INJECTION INTRAMUSCULAR; INTRAVENOUS PRN
Status: DISCONTINUED | OUTPATIENT
Start: 2018-11-12 | End: 2018-11-12 | Stop reason: SDUPTHER

## 2018-11-12 RX ORDER — FENTANYL CITRATE 50 UG/ML
INJECTION, SOLUTION INTRAMUSCULAR; INTRAVENOUS PRN
Status: DISCONTINUED | OUTPATIENT
Start: 2018-11-12 | End: 2018-11-12 | Stop reason: SDUPTHER

## 2018-11-12 RX ORDER — PROPOFOL 10 MG/ML
INJECTION, EMULSION INTRAVENOUS CONTINUOUS PRN
Status: DISCONTINUED | OUTPATIENT
Start: 2018-11-12 | End: 2018-11-12 | Stop reason: SDUPTHER

## 2018-11-12 RX ORDER — LIDOCAINE HYDROCHLORIDE 20 MG/ML
7 INJECTION, SOLUTION EPIDURAL; INFILTRATION; INTRACAUDAL; PERINEURAL ONCE
Status: COMPLETED | OUTPATIENT
Start: 2018-11-12 | End: 2018-11-12

## 2018-11-12 RX ORDER — METHADONE HYDROCHLORIDE 10 MG/1
10 TABLET ORAL EVERY 12 HOURS
Status: DISCONTINUED | OUTPATIENT
Start: 2018-11-12 | End: 2018-11-14 | Stop reason: HOSPADM

## 2018-11-12 RX ORDER — GABAPENTIN 300 MG/1
300 CAPSULE ORAL 3 TIMES DAILY
Status: DISCONTINUED | OUTPATIENT
Start: 2018-11-12 | End: 2018-11-14 | Stop reason: HOSPADM

## 2018-11-12 RX ORDER — FENTANYL 100 UG/H
1 PATCH TRANSDERMAL
Status: DISCONTINUED | OUTPATIENT
Start: 2018-11-12 | End: 2018-11-14 | Stop reason: HOSPADM

## 2018-11-12 RX ORDER — FLUCONAZOLE 100 MG/1
200 TABLET ORAL DAILY
Status: DISCONTINUED | OUTPATIENT
Start: 2018-11-12 | End: 2018-11-14 | Stop reason: HOSPADM

## 2018-11-12 RX ORDER — FENTANYL CITRATE 50 UG/ML
50 INJECTION, SOLUTION INTRAMUSCULAR; INTRAVENOUS ONCE
Status: COMPLETED | OUTPATIENT
Start: 2018-11-12 | End: 2018-11-12

## 2018-11-12 RX ORDER — HYDROCODONE BITARTRATE AND ACETAMINOPHEN 10; 325 MG/1; MG/1
1 TABLET ORAL EVERY 4 HOURS PRN
Status: DISCONTINUED | OUTPATIENT
Start: 2018-11-12 | End: 2018-11-14 | Stop reason: HOSPADM

## 2018-11-12 RX ADMIN — PROPOFOL 50 MCG/KG/MIN: 10 INJECTION, EMULSION INTRAVENOUS at 15:08

## 2018-11-12 RX ADMIN — METHADONE HYDROCHLORIDE 10 MG: 10 TABLET ORAL at 18:14

## 2018-11-12 RX ADMIN — ONDANSETRON 4 MG: 2 INJECTION INTRAMUSCULAR; INTRAVENOUS at 18:44

## 2018-11-12 RX ADMIN — Medication 10 ML: at 08:05

## 2018-11-12 RX ADMIN — LIDOCAINE HYDROCHLORIDE 7 ML: 20 INJECTION, SOLUTION EPIDURAL; INFILTRATION; INTRACAUDAL; PERINEURAL at 15:36

## 2018-11-12 RX ADMIN — FENTANYL CITRATE 50 MCG: 50 INJECTION, SOLUTION INTRAMUSCULAR; INTRAVENOUS at 15:08

## 2018-11-12 RX ADMIN — HYDROMORPHONE HYDROCHLORIDE 1 MG: 1 INJECTION, SOLUTION INTRAMUSCULAR; INTRAVENOUS; SUBCUTANEOUS at 08:00

## 2018-11-12 RX ADMIN — HYDROMORPHONE HYDROCHLORIDE 1 MG: 1 INJECTION, SOLUTION INTRAMUSCULAR; INTRAVENOUS; SUBCUTANEOUS at 10:08

## 2018-11-12 RX ADMIN — CEFEPIME 1 G: 1 INJECTION, POWDER, FOR SOLUTION INTRAMUSCULAR; INTRAVENOUS at 03:03

## 2018-11-12 RX ADMIN — FENTANYL CITRATE 50 MCG: 50 INJECTION, SOLUTION INTRAMUSCULAR; INTRAVENOUS at 16:29

## 2018-11-12 RX ADMIN — FENTANYL CITRATE 50 MCG: 50 INJECTION, SOLUTION INTRAMUSCULAR; INTRAVENOUS at 15:21

## 2018-11-12 RX ADMIN — MIDAZOLAM HYDROCHLORIDE 2 MG: 1 INJECTION, SOLUTION INTRAMUSCULAR; INTRAVENOUS at 15:08

## 2018-11-12 RX ADMIN — HYDROCODONE BITARTRATE AND ACETAMINOPHEN 1 TABLET: 10; 325 TABLET ORAL at 22:04

## 2018-11-12 RX ADMIN — HYDROMORPHONE HYDROCHLORIDE 1 MG: 1 INJECTION, SOLUTION INTRAMUSCULAR; INTRAVENOUS; SUBCUTANEOUS at 12:01

## 2018-11-12 RX ADMIN — HYDROCODONE BITARTRATE AND ACETAMINOPHEN 1 TABLET: 10; 325 TABLET ORAL at 18:14

## 2018-11-12 RX ADMIN — SODIUM CHLORIDE: 9 INJECTION, SOLUTION INTRAVENOUS at 15:01

## 2018-11-12 RX ADMIN — GABAPENTIN 300 MG: 300 CAPSULE ORAL at 22:04

## 2018-11-12 RX ADMIN — CEFEPIME 1 G: 1 INJECTION, POWDER, FOR SOLUTION INTRAMUSCULAR; INTRAVENOUS at 17:00

## 2018-11-12 RX ADMIN — FLUCONAZOLE 200 MG: 100 TABLET ORAL at 17:55

## 2018-11-12 RX ADMIN — DEXTROSE AND SODIUM CHLORIDE: 5; 450 INJECTION, SOLUTION INTRAVENOUS at 08:00

## 2018-11-12 RX ADMIN — HYDROMORPHONE HYDROCHLORIDE 1 MG: 1 INJECTION, SOLUTION INTRAMUSCULAR; INTRAVENOUS; SUBCUTANEOUS at 17:00

## 2018-11-12 RX ADMIN — IOVERSOL 2 ML: 678 INJECTION INTRA-ARTERIAL; INTRAVENOUS at 15:36

## 2018-11-12 RX ADMIN — HYDROMORPHONE HYDROCHLORIDE 1 MG: 1 INJECTION, SOLUTION INTRAMUSCULAR; INTRAVENOUS; SUBCUTANEOUS at 03:00

## 2018-11-12 RX ADMIN — AMLODIPINE BESYLATE 5 MG: 5 TABLET ORAL at 08:00

## 2018-11-12 RX ADMIN — HYDROMORPHONE HYDROCHLORIDE 1 MG: 1 INJECTION, SOLUTION INTRAMUSCULAR; INTRAVENOUS; SUBCUTANEOUS at 05:28

## 2018-11-12 ASSESSMENT — PAIN DESCRIPTION - LOCATION
LOCATION: BACK;PERINEUM
LOCATION: BACK
LOCATION: BACK
LOCATION: BACK;VAGINA
LOCATION: BACK;VAGINA
LOCATION: BACK
LOCATION: BACK;VAGINA
LOCATION: BACK
LOCATION: PERINEUM;BACK

## 2018-11-12 ASSESSMENT — PAIN DESCRIPTION - ORIENTATION
ORIENTATION: LOWER
ORIENTATION: LOWER
ORIENTATION: LOWER;MID
ORIENTATION: LOWER

## 2018-11-12 ASSESSMENT — PAIN SCALES - GENERAL
PAINLEVEL_OUTOF10: 10
PAINLEVEL_OUTOF10: 10
PAINLEVEL_OUTOF10: 9
PAINLEVEL_OUTOF10: 10
PAINLEVEL_OUTOF10: 9
PAINLEVEL_OUTOF10: 10
PAINLEVEL_OUTOF10: 9
PAINLEVEL_OUTOF10: 10
PAINLEVEL_OUTOF10: 10
PAINLEVEL_OUTOF10: 4
PAINLEVEL_OUTOF10: 10

## 2018-11-12 ASSESSMENT — PAIN DESCRIPTION - PAIN TYPE
TYPE: CHRONIC PAIN;SURGICAL PAIN
TYPE: ACUTE PAIN
TYPE: ACUTE PAIN
TYPE: CHRONIC PAIN;SURGICAL PAIN
TYPE: ACUTE PAIN
TYPE: CHRONIC PAIN;SURGICAL PAIN

## 2018-11-12 ASSESSMENT — PAIN DESCRIPTION - FREQUENCY
FREQUENCY: CONTINUOUS
FREQUENCY: CONTINUOUS

## 2018-11-12 ASSESSMENT — ENCOUNTER SYMPTOMS: SHORTNESS OF BREATH: 1

## 2018-11-12 ASSESSMENT — PAIN DESCRIPTION - DESCRIPTORS
DESCRIPTORS: ACHING;CRAMPING;DISCOMFORT
DESCRIPTORS: ACHING;BURNING;CONSTANT;CRAMPING
DESCRIPTORS: DISCOMFORT
DESCRIPTORS: ACHING;CRAMPING;DISCOMFORT
DESCRIPTORS: ACHING;DISCOMFORT
DESCRIPTORS: ACHING;CONSTANT;DISCOMFORT

## 2018-11-12 ASSESSMENT — LIFESTYLE VARIABLES: SMOKING_STATUS: 1

## 2018-11-12 NOTE — INTERVAL H&P NOTE
H&P Update    Patient's History and Physical  was reviewed. The patient appears likely to able to tolerate the procedure. Risk and benefits discussed including ultimate complications, possibly death and consent obtained.     Teresita Baltazar, II

## 2018-11-12 NOTE — ANESTHESIA PRE PROCEDURE
11/12/2018    GFRAA 37 11/12/2018    LABGLOM 31 11/12/2018    GLUCOSE 112 11/12/2018    PROT 5.8 11/10/2018    CALCIUM 6.8 11/12/2018    BILITOT <0.2 11/10/2018    ALKPHOS 54 11/10/2018    AST 13 11/10/2018    ALT 7 11/10/2018       POC Tests: No results for input(s): POCGLU, POCNA, POCK, POCCL, POCBUN, POCHEMO, POCHCT in the last 72 hours. Coags:   Lab Results   Component Value Date    PROTIME 12.6 11/11/2018    INR 1.1 11/11/2018    APTT 24.7 11/11/2018       HCG (If Applicable): No results found for: PREGTESTUR, PREGSERUM, HCG, HCGQUANT     ABGs: No results found for: PHART, PO2ART, BTF5XDU, JYW3TAE, BEART, N5BFDXKZ     Type & Screen (If Applicable):  No results found for: LABABO, LABRH     CT Abdomen/Pelvis 11/10/2018  Impression   There is a large amount stool throughout the colon, please correlate   clinically for constipation    Bilateral pleural effusions and associated likely compressive   atelectasis   Anasarca with mild ascites   Bilateral hydronephrosis, which is severe with a left ureteral stent   present, the stent is likely nonfunctional   Density adjacent to the distal left ureteral stent which could   represent hemorrhage or mass   ALERT:  THIS IS AN ABNORMAL REPORT     CXR 4/27/2016      Impression   IMPRESSION:     Normal chest.     US Venous Doppler Lower Extremities 11/11/2018  Impression   Negative for evident DVT at major veins of either lower   extremity.          EKG 1/11/2017 HR 52  Narrative     Sinus bradycardia  Right atrial enlargement  Nonspecific ST abnormality  Abnormal ECG  When compared with ECG of 27-APR-2016 20:14,  Significant changes have occurred  Confirmed by Celio Giordano ([id number]) on 1/11/2017 6:08:54 PM         Anesthesia Evaluation  Patient summary reviewed and Nursing notes reviewed no history of anesthetic complications:   Airway: Mallampati: II  TM distance: >3 FB   Neck ROM: full  Mouth opening: > = 3 FB Dental:      Comment: Denies missing/loose/chipped teeth on bottom,  Adentureless on bottom    Pulmonary:normal exam    (+) COPD (Albuterol 4x/day at home):  shortness of breath: chronic,  current smoker          Patient did not smoke on day of surgery. 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 Endo/Other:    (+) blood dyscrasia (received 2 units PRBC on 11/10/2018): anemia, arthritis: OA., malignancy/cancer (malignant neoplasm of exocervix, Cervial ca). ROS comment: Chemo [facility], completed radiation therapy  Left leg edematous and reddened, hot - US BILLE negative for DVT Abdominal:           Vascular:                                  Anesthesia Plan      MAC     ASA 4     (Mediport R CW)  Induction: intravenous. Anesthetic plan and risks discussed with patient. Plan discussed with CRNA and attending.                 Diamante Cameron RN   11/12/2018

## 2018-11-12 NOTE — CARE COORDINATION
SOCIAL WORK AND DISCHARGE PLANNING: Requested to see pt, from son. Pt has custody of her 13and 9year old grandchildren. She wanted it to be known that if anything happened to her, she wanted her son to have custody(Lev Romypasha Rajput.). She obtained custody through the courts, and I told her that it would have to go through the courts again if something happened. Pt for surgery this AM. Pt lives with her 2 grandchildren in a 2 story home, with bedrooms on the 2nd floor, full bath on the first. There is a bed in the dining room for the pt. Pt was independent PTA. No [facility] PTA. Has a rollator. Her sister lives next door. Pt goes to the [facility] in [facility]. Has a treatment once a week for 2 weeks then, off a week for chemo. Pt was on radiation, but now done states she never wants to do it again. Pt has 2 sons, both recently out of penitentiary. Plan is hope, SW to follow.  Lieutenant Campbell 11/12/2018

## 2018-11-13 LAB
HEMOGLOBIN: 7.2 G/DL (ref 11.5–15.5)
HEMOGLOBIN: 8.2 G/DL (ref 11.5–15.5)
HEMOGLOBIN: 8.3 G/DL (ref 11.5–15.5)

## 2018-11-13 PROCEDURE — 85018 HEMOGLOBIN: CPT

## 2018-11-13 PROCEDURE — 2140000000 HC CCU INTERMEDIATE R&B

## 2018-11-13 PROCEDURE — 6370000000 HC RX 637 (ALT 250 FOR IP): Performed by: EMERGENCY MEDICINE

## 2018-11-13 PROCEDURE — 97530 THERAPEUTIC ACTIVITIES: CPT

## 2018-11-13 PROCEDURE — G8987 SELF CARE CURRENT STATUS: HCPCS

## 2018-11-13 PROCEDURE — 6370000000 HC RX 637 (ALT 250 FOR IP): Performed by: SPECIALIST

## 2018-11-13 PROCEDURE — G8988 SELF CARE GOAL STATUS: HCPCS

## 2018-11-13 PROCEDURE — 97162 PT EVAL MOD COMPLEX 30 MIN: CPT

## 2018-11-13 PROCEDURE — 99233 SBSQ HOSP IP/OBS HIGH 50: CPT | Performed by: EMERGENCY MEDICINE

## 2018-11-13 PROCEDURE — 2580000003 HC RX 258: Performed by: STUDENT IN AN ORGANIZED HEALTH CARE EDUCATION/TRAINING PROGRAM

## 2018-11-13 PROCEDURE — 51702 INSERT TEMP BLADDER CATH: CPT

## 2018-11-13 PROCEDURE — 6360000002 HC RX W HCPCS: Performed by: STUDENT IN AN ORGANIZED HEALTH CARE EDUCATION/TRAINING PROGRAM

## 2018-11-13 PROCEDURE — G8978 MOBILITY CURRENT STATUS: HCPCS

## 2018-11-13 PROCEDURE — 6360000002 HC RX W HCPCS: Performed by: EMERGENCY MEDICINE

## 2018-11-13 PROCEDURE — 97165 OT EVAL LOW COMPLEX 30 MIN: CPT

## 2018-11-13 PROCEDURE — 36415 COLL VENOUS BLD VENIPUNCTURE: CPT

## 2018-11-13 PROCEDURE — 6370000000 HC RX 637 (ALT 250 FOR IP): Performed by: STUDENT IN AN ORGANIZED HEALTH CARE EDUCATION/TRAINING PROGRAM

## 2018-11-13 PROCEDURE — G8979 MOBILITY GOAL STATUS: HCPCS

## 2018-11-13 RX ORDER — CLOTRIMAZOLE 1 %
CREAM WITH APPLICATOR VAGINAL
Qty: 1 TUBE | Refills: 1 | Status: SHIPPED | OUTPATIENT
Start: 2018-11-13 | End: 2018-11-20

## 2018-11-13 RX ORDER — CLOTRIMAZOLE 10 MG/1
10 LOZENGE ORAL; TOPICAL 4 TIMES DAILY
Qty: 40 TABLET | Refills: 0 | Status: SHIPPED | OUTPATIENT
Start: 2018-11-13 | End: 2018-11-23

## 2018-11-13 RX ORDER — CLOTRIMAZOLE 1 %
CREAM WITH APPLICATOR VAGINAL 2 TIMES DAILY
Status: DISCONTINUED | OUTPATIENT
Start: 2018-11-13 | End: 2018-11-14 | Stop reason: HOSPADM

## 2018-11-13 RX ORDER — METOCLOPRAMIDE HYDROCHLORIDE 5 MG/ML
10 INJECTION INTRAMUSCULAR; INTRAVENOUS EVERY 6 HOURS PRN
Status: DISCONTINUED | OUTPATIENT
Start: 2018-11-13 | End: 2018-11-14 | Stop reason: HOSPADM

## 2018-11-13 RX ORDER — AMLODIPINE BESYLATE 5 MG/1
5 TABLET ORAL DAILY
Qty: 30 TABLET | Refills: 0 | Status: SHIPPED | OUTPATIENT
Start: 2018-11-14

## 2018-11-13 RX ORDER — METOCLOPRAMIDE HYDROCHLORIDE 5 MG/ML
10 INJECTION INTRAMUSCULAR; INTRAVENOUS EVERY 6 HOURS PRN
Qty: 2 ML | Refills: 0 | Status: SHIPPED | OUTPATIENT
Start: 2018-11-13 | End: 2019-04-29 | Stop reason: ALTCHOICE

## 2018-11-13 RX ORDER — FLUCONAZOLE 200 MG/1
200 TABLET ORAL DAILY
Qty: 14 TABLET | Refills: 0 | Status: SHIPPED | OUTPATIENT
Start: 2018-11-14 | End: 2018-11-28

## 2018-11-13 RX ORDER — CLOTRIMAZOLE 10 MG/1
10 LOZENGE ORAL; TOPICAL 4 TIMES DAILY
Status: DISCONTINUED | OUTPATIENT
Start: 2018-11-13 | End: 2018-11-14 | Stop reason: HOSPADM

## 2018-11-13 RX ORDER — ATROPA BELLADONNA AND OPIUM 16.2; 6 MG/1; MG/1
60 SUPPOSITORY RECTAL 2 TIMES DAILY PRN
Qty: 5 SUPPOSITORY | Refills: 0 | Status: SHIPPED | OUTPATIENT
Start: 2018-11-13 | End: 2019-04-29

## 2018-11-13 RX ADMIN — HYDROCODONE BITARTRATE AND ACETAMINOPHEN 1 TABLET: 10; 325 TABLET ORAL at 02:05

## 2018-11-13 RX ADMIN — DEXTROSE AND SODIUM CHLORIDE: 5; 450 INJECTION, SOLUTION INTRAVENOUS at 13:04

## 2018-11-13 RX ADMIN — ONDANSETRON 4 MG: 2 INJECTION INTRAMUSCULAR; INTRAVENOUS at 19:51

## 2018-11-13 RX ADMIN — Medication 10 ML: at 04:09

## 2018-11-13 RX ADMIN — AMLODIPINE BESYLATE 5 MG: 5 TABLET ORAL at 08:36

## 2018-11-13 RX ADMIN — CLOTRIMAZOLE 10 MG: 10 LOZENGE ORAL; TOPICAL at 15:06

## 2018-11-13 RX ADMIN — METHADONE HYDROCHLORIDE 10 MG: 10 TABLET ORAL at 18:13

## 2018-11-13 RX ADMIN — ONDANSETRON 4 MG: 2 INJECTION INTRAMUSCULAR; INTRAVENOUS at 10:52

## 2018-11-13 RX ADMIN — HYDROCODONE BITARTRATE AND ACETAMINOPHEN 1 TABLET: 10; 325 TABLET ORAL at 08:39

## 2018-11-13 RX ADMIN — GABAPENTIN 300 MG: 300 CAPSULE ORAL at 14:36

## 2018-11-13 RX ADMIN — Medication 10 ML: at 03:41

## 2018-11-13 RX ADMIN — CLOTRIMAZOLE 10 MG: 10 LOZENGE ORAL; TOPICAL at 19:51

## 2018-11-13 RX ADMIN — HYDROCODONE BITARTRATE AND ACETAMINOPHEN 1 TABLET: 10; 325 TABLET ORAL at 19:51

## 2018-11-13 RX ADMIN — METOCLOPRAMIDE 10 MG: 5 INJECTION, SOLUTION INTRAMUSCULAR; INTRAVENOUS at 17:14

## 2018-11-13 RX ADMIN — FLUCONAZOLE 200 MG: 100 TABLET ORAL at 08:36

## 2018-11-13 RX ADMIN — GABAPENTIN 300 MG: 300 CAPSULE ORAL at 20:50

## 2018-11-13 RX ADMIN — DEXTROSE AND SODIUM CHLORIDE: 5; 450 INJECTION, SOLUTION INTRAVENOUS at 19:28

## 2018-11-13 RX ADMIN — GABAPENTIN 300 MG: 300 CAPSULE ORAL at 08:36

## 2018-11-13 RX ADMIN — HYDROCODONE BITARTRATE AND ACETAMINOPHEN 1 TABLET: 10; 325 TABLET ORAL at 13:10

## 2018-11-13 RX ADMIN — METHADONE HYDROCHLORIDE 10 MG: 10 TABLET ORAL at 06:39

## 2018-11-13 RX ADMIN — CEFEPIME 1 G: 1 INJECTION, POWDER, FOR SOLUTION INTRAMUSCULAR; INTRAVENOUS at 04:09

## 2018-11-13 RX ADMIN — ONDANSETRON 4 MG: 2 INJECTION INTRAMUSCULAR; INTRAVENOUS at 03:41

## 2018-11-13 RX ADMIN — Medication 10 ML: at 08:36

## 2018-11-13 ASSESSMENT — PAIN DESCRIPTION - FREQUENCY
FREQUENCY: CONTINUOUS

## 2018-11-13 ASSESSMENT — PAIN DESCRIPTION - LOCATION
LOCATION: BACK;OTHER (COMMENT)
LOCATION: BACK
LOCATION: BACK;OTHER (COMMENT)
LOCATION: BACK
LOCATION: BACK;OTHER (COMMENT)
LOCATION: BACK

## 2018-11-13 ASSESSMENT — PAIN DESCRIPTION - PAIN TYPE
TYPE: CHRONIC PAIN

## 2018-11-13 ASSESSMENT — PAIN SCALES - GENERAL
PAINLEVEL_OUTOF10: 8
PAINLEVEL_OUTOF10: 9
PAINLEVEL_OUTOF10: 0
PAINLEVEL_OUTOF10: 9
PAINLEVEL_OUTOF10: 10
PAINLEVEL_OUTOF10: 9
PAINLEVEL_OUTOF10: 6
PAINLEVEL_OUTOF10: 8
PAINLEVEL_OUTOF10: 4
PAINLEVEL_OUTOF10: 8
PAINLEVEL_OUTOF10: 9
PAINLEVEL_OUTOF10: 9

## 2018-11-13 ASSESSMENT — PAIN DESCRIPTION - DESCRIPTORS
DESCRIPTORS: ACHING;DISCOMFORT;SORE
DESCRIPTORS: ACHING;CONSTANT;DISCOMFORT
DESCRIPTORS: ACHING;DISCOMFORT;SORE
DESCRIPTORS: ACHING;CONSTANT;DISCOMFORT
DESCRIPTORS: ACHING;CONSTANT;DISCOMFORT
DESCRIPTORS: DISCOMFORT
DESCRIPTORS: ACHING;DISCOMFORT;SORE
DESCRIPTORS: ACHING;DISCOMFORT
DESCRIPTORS: ACHING;CONSTANT;DISCOMFORT
DESCRIPTORS: ACHING;CONSTANT;DISCOMFORT

## 2018-11-13 ASSESSMENT — PAIN DESCRIPTION - ORIENTATION
ORIENTATION: LOWER

## 2018-11-13 ASSESSMENT — PAIN DESCRIPTION - ONSET
ONSET: ON-GOING

## 2018-11-13 ASSESSMENT — PAIN DESCRIPTION - PROGRESSION
CLINICAL_PROGRESSION: GRADUALLY WORSENING
CLINICAL_PROGRESSION: NOT CHANGED
CLINICAL_PROGRESSION: NOT CHANGED
CLINICAL_PROGRESSION: GRADUALLY WORSENING
CLINICAL_PROGRESSION: NOT CHANGED
CLINICAL_PROGRESSION: GRADUALLY WORSENING

## 2018-11-13 NOTE — CONSULTS
SpO2 97%   BMI 18.32 kg/m²           DATA:  Labs:  CBC: Lab Results       Component                Value               Date                       WBC                      7.2                 11/10/2018                 RBC                      2.43                11/10/2018                 HGB                      8.2                 11/13/2018                 HCT                      21.6                11/10/2018                 MCV                      100.8               11/10/2018                 RDW                      17.7                11/10/2018                 PLT                      233                 11/10/2018              IMPRESSION/RECOMMENDATIONS:    Vag bleed  Hx cervical CA - never saw gyn onc - pt at [facility] - prior radoation/chemo currently - s/p nephrostomy tube  Pelvic sono pend  Recommend GYN ONC involvement but not available locally    Conchis  11/13/2018 7:37 AM

## 2018-11-13 NOTE — PLAN OF CARE
Problem: Pain:  Goal: Control of acute pain  Control of acute pain   Outcome: Not Met This Shift  Continues to have pain

## 2018-11-14 VITALS
BODY MASS INDEX: 18.34 KG/M2 | OXYGEN SATURATION: 96 % | HEART RATE: 69 BPM | HEIGHT: 65 IN | WEIGHT: 110.1 LBS | RESPIRATION RATE: 9 BRPM | TEMPERATURE: 98.2 F | DIASTOLIC BLOOD PRESSURE: 62 MMHG | SYSTOLIC BLOOD PRESSURE: 148 MMHG

## 2018-11-14 LAB
BLOOD BANK DISPENSE STATUS: NORMAL
BLOOD BANK DISPENSE STATUS: NORMAL
BLOOD BANK PRODUCT CODE: NORMAL
BLOOD BANK PRODUCT CODE: NORMAL
BPU ID: NORMAL
BPU ID: NORMAL
DESCRIPTION BLOOD BANK: NORMAL
DESCRIPTION BLOOD BANK: NORMAL
HEMOGLOBIN: 7.9 G/DL (ref 11.5–15.5)

## 2018-11-14 PROCEDURE — 85018 HEMOGLOBIN: CPT

## 2018-11-14 PROCEDURE — 36415 COLL VENOUS BLD VENIPUNCTURE: CPT

## 2018-11-14 ASSESSMENT — PAIN SCALES - GENERAL: PAINLEVEL_OUTOF10: 0

## 2018-11-14 NOTE — DISCHARGE SUMMARY
cough with sputum production, ID consulted for antibiotics. Palliative was consulted as well. 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 She felt better afterwards. Gynecology consulted for possible vaginal bleeding and gyn exam. Pelvic US was ordered . Transferred to [address] [place] [address] for gyn oncology. Consults:     IP CONSULT TO UROLOGY  IP CONSULT TO PALLIATIVE CARE  IP CONSULT TO INFECTIOUS DISEASES  IP CONSULT TO OB GYN    Significant Diagnostic Studies:  As above      Discharge Instructions/Follow-up:  As above       Activity: activity as tolerated    Physical Exam:  Vitals:    11/14/18 0000   BP: (!) 148/62   Pulse: 69   Resp: 9   Temp: 98.2 °F (36.8 °C)   SpO2: 96%     General appearance:  No apparent distress, appears stated age and cooperative. HEENT:  Normal cephalic, atraumatic without obvious deformity. Pupils equal, round, and reactive to light.  Extra ocular muscles intact. Conjunctivae/corneas clear. Neck: Supple, with full range of motion. No jugular venous distention. Trachea midline. Respiratory:  Normal respiratory effort. Clear to auscultation, bilaterally without Rales/Wheezes/Rhonchi. Cardiovascular:  Regular rate and rhythm with normal S1/S2 without murmurs, rubs or gallops. Abdomen: Soft, non-tender, non-distended with normal bowel sounds. Musculoskeletal:  No clubbing, cyanosis or edema bilaterally.  Full range of motion without deformity. Estrella has gross blood. Skin: Skin color, texture, turgor normal.  No rashes or lesions. Neurologic:  Neurovascularly intact without any focal sensory/motor deficits. Psychiatric:  Alert and oriented, thought content appropriate, normal insight  Capillary Refill: Brisk,< 3 seconds     Labs:  For convenience and continuity at follow-up the following most recent labs are provided:      CBC:    Lab Results   Component Value Date    WBC 7.2 11/10/2018

## 2018-11-14 NOTE — PROGRESS NOTES
Hospitalist Progress Note      PCP: Joelle Mejia MD    Date of Admission: 11/10/2018  Days in the hospital: 1    Chief Complaint: hematuria     Hospital Course:     Pt admitted for hematuria, with b/l hydronephrosis. Urology was consulted, beckford was placed. Pt needed transfusion. Pt was on chemo, oncology was als consulted. She also c/o cough with sputum production, ID consulted for antibiotics. Palliative was consulted as well. Subjective  Patient seen and examined at bedside. She is AAOx3, better today than yesterday. Received blood transfusion overnight. Still has hematuria in the becfkord. Says her pain is better controlled. Patient denies any fevers, chills, chest pain, shortness of breath, nausea, vomiting. Medications:  Reviewed    Infusion Medications    sodium chloride 150 mL/hr at 11/10/18 1000    dextrose 5 % and 0.45 % NaCl 100 mL/hr at 11/11/18 0459     Scheduled Medications    ferric subsulfate   Topical Once    amLODIPine  5 mg Oral Daily    docusate sodium  100 mg Oral BID    sodium chloride flush  10 mL Intravenous 2 times per day    cefepime  1 g Intravenous Q12H    sodium chloride  250 mL Intravenous Once     PRN Meds: HYDROmorphone, sodium chloride flush, sodium chloride flush, magnesium hydroxide, ondansetron, hydrALAZINE      Intake/Output Summary (Last 24 hours) at 11/11/18 0855  Last data filed at 11/11/18 0630   Gross per 24 hour   Intake              750 ml   Output             3590 ml   Net            -2840 ml       Exam:  General appearance:  No apparent distress, appears stated age and cooperative. HEENT:  Normal cephalic, atraumatic without obvious deformity. Pupils equal, round, and reactive to light. Extra ocular muscles intact. Conjunctivae/corneas clear. Neck: Supple, with full range of motion. No jugular venous distention. Trachea midline. Respiratory:  Normal respiratory effort.  Clear to auscultation, bilaterally without
Hospitalist Progress Note      PCP: Karen Montes MD    Date of Admission: 11/10/2018  Days in the hospital: 3    Chief Complaint: hematuria     Hospital Course:     Pt admitted for hematuria, with b/l hydronephrosis. Urology was consulted, beckford was placed. Pt needed transfusion. Pt was on chemo, oncology was als consulted. She also c/o cough with sputum production, ID consulted for antibiotics. Palliative was consulted as well. 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 Gynecology consulted for possible vaginal bleeding and gyn exam. Pelvic US - pending. Subjective  Patient seen and examined at bedside. She is AAOx3, S/p IR right nephrotomy tube placement. Feels a lot better today. Patient denies any fevers, chills, chest pain, shortness of breath, nausea, vomiting. Medications:  Reviewed    Infusion Medications    sodium chloride 0  (11/11/18 8145)    dextrose 5 % and 0.45 % NaCl 100 mL/hr at 11/12/18 0800     Scheduled Medications    fluconazole  200 mg Oral Daily    fentaNYL  1 patch Transdermal Q72H    methadone  10 mg Oral Q12H    gabapentin  300 mg Oral TID    ferric subsulfate   Topical Once    amLODIPine  5 mg Oral Daily    docusate sodium  100 mg Oral BID    sodium chloride flush  10 mL Intravenous 2 times per day    cefepime  1 g Intravenous Q12H    sodium chloride  250 mL Intravenous Once     PRN Meds: HYDROcodone-acetaminophen, opium-belladonna, sodium chloride flush, sodium chloride flush, magnesium hydroxide, ondansetron, hydrALAZINE      Intake/Output Summary (Last 24 hours) at 11/13/18 0916  Last data filed at 11/13/18 6153   Gross per 24 hour   Intake          3018.64 ml   Output             7650 ml   Net         -4631.36 ml       Exam:  General appearance:  No apparent distress, appears stated age and cooperative. HEENT:  Normal cephalic, atraumatic without obvious deformity.
ID consult called. Dr. Nicolasa Munoz called back.
Medical record request form faxed to [facility]. Fax confirmation came back OK.
Messaged Dr. Sima Ferris re: pt c/o 10/10 pain after giving morphine at 2140. Only has morphine q4H ordered. Will await orders.      Marine Collins
Messaged Dr. Wyatt Dale regarding Hgb 5.5 and L leg is significantly more swollen than the R leg, warm and painful.     Rafael Dong
Nurse to nurse called to [facility] [place]. Awaiting transport.
Patient refusing lab to draw her.
Pharmacy Consultation Note  (Antibiotic Dosing and Monitoring)    Initial consult date: 11/10/18  Consulting physician: Dr. Selina Alfaro  Drug(s): Vancomycin  Indication: Empiric    Ht Readings from Last 1 Encounters:   11/10/18 5' 5\" (1.651 m)     Wt Readings from Last 1 Encounters:   11/10/18 113 lb 3 oz (51.3 kg)       Age/  Gender IBW DW  Allergy Information   59 y.o. female 57 kg 51.3 kg  Latex; Lactose intolerance (gi); Other; Leamon Alvine [aspirin]; and Contrast [barium-containing compounds]                 Date  WBC BUN/CR Drug/Dose Time   Given Level(s)   (Time) Comments   11/10 7.2 19/1.9 Vancomycin 1000 mg IV x1 (1600) Random level ordered                                 No intake or output data in the 24 hours ending 11/10/18 1500    Temp max: Temp (24hrs), Av.8 °F (36.6 °C), Min:97.1 °F (36.2 °C), Max:98.2 °F (36.8 °C)      Cultures:  available culture and sensitivity results were reviewed in EPIC  11/10: Blood pending  11/10: Urine pending  11/10: Respiratory pending    Assessment:  · 59year old female admitted on 11/10 and is being evaluated for fever and cough with some sputum production. She has a PMH for Stage 4 Cervical Cancer, for which she has been receiving chemo. Patient also has a history of Stage IV CKD  · Goal trough level:  15-20 mcg/mL  · Patient has been afebrile, WBC WNL  · sCr 1.9 (baseline unknown), eCrCl 20 - 30 mL/min  · Day 1 Cefepime and Vancomycin    Plan:  · Will give vancomycin 1000 mg IV x1 today, then check a random level tomorrow to assess renal function  · May need to dose per levels.    · Pharmacist will follow and monitor/adjust dosing as necessary    Gisel Carson, PharmD-PGY1 11/10/2018 3:10 PM   Pager: [phone]
Component Value Date    WBC 7.2 11/10/2018    WBC 8.1 01/11/2017    WBC 8.5 04/27/2016    RBC 2.43 11/10/2018    RBC 4.79 01/11/2017    RBC 4.64 04/27/2016    HGB 8.3 11/13/2018    HGB 8.2 11/13/2018    HGB 8.7 11/12/2018    HGB 7.6 11/12/2018    HGB 9.0 11/11/2018    HCT 21.6 11/10/2018    HCT SEE BELOW 11/10/2018    HCT 23.9 11/10/2018    HCT 24.5 11/10/2018    HCT 42.7 01/11/2017     11/10/2018     01/11/2017     04/27/2016    .8 11/10/2018    MCV 89.1 01/11/2017    MCV 91.2 04/27/2016    MCH 30.5 11/10/2018    MCH 30.7 01/11/2017    MCH 31.0 04/27/2016    MCHC 30.2 11/10/2018    MCHC 34.4 01/11/2017    MCHC 34.0 04/27/2016    RDW 17.7 11/10/2018    RDW 12.8 01/11/2017    RDW 13.8 04/27/2016    LYMPHOPCT 4.3 11/10/2018    LYMPHOPCT 18.9 01/11/2017    LYMPHOPCT 7 04/27/2016    MONOPCT 6.1 11/10/2018    MONOPCT 6.7 01/11/2017    MONOPCT 7 04/27/2016    BASOPCT 0.7 11/10/2018    BASOPCT 0.5 01/11/2017    BASOPCT 0 04/27/2016    MONOSABS 0.43 11/10/2018    MONOSABS 0.54 01/11/2017    MONOSABS 0.60 04/27/2016    LYMPHSABS 0.29 11/10/2018    LYMPHSABS 1.53 01/11/2017    LYMPHSABS 0.57 04/27/2016    EOSABS 0.12 11/10/2018    EOSABS 0.13 01/11/2017    EOSABS 0.00 04/27/2016    BASOSABS 0.00 11/10/2018    BASOSABS 0.04 01/11/2017    BASOSABS 0.00 04/27/2016     CMP:    Lab Results   Component Value Date     11/12/2018    K 3.8 11/12/2018    K 3.9 11/11/2018     11/12/2018    CO2 19 11/12/2018    BUN 12 11/12/2018    BUN 16 11/11/2018    BUN 19 11/10/2018    BUN 14 06/13/2017    BUN 14 01/11/2017    CREATININE 1.7 11/12/2018    CREATININE 1.6 11/11/2018    CREATININE 1.9 11/10/2018    CREATININE 1.83 09/26/2017    CREATININE 1.01 06/13/2017    GFRAA 37 11/12/2018    LABGLOM 31 11/12/2018    GLUCOSE 112 11/12/2018    PROT 5.8 11/10/2018    LABALBU 3.1 11/10/2018    CALCIUM 6.8 11/12/2018    BILITOT <0.2 11/10/2018    ALKPHOS 54 11/10/2018    AST 13 11/10/2018    ALT 7 11/10/2018
modifications required to perform tasks. No comorbities affecting occupational performance. Patients Goal: return home   Treatment: OT eval, bed mobility, sitting balance at EOB for 10 minutes with supervision, transfer training with verbal cues for hand placement, static standing balance with no device, functional mobility with minimal assist, pt c/o being lightheaded, pt states lightheadedness resolved once standing still for a minute, pt continued ambulating in hallway and was then returned to room, pt seated EOB with PT at end of evaluation. All needs within reach. Two friends present. Rehab Potential: good   Plan of care: Patient will be seen by OT 1-3 times a week for therapeutic activity, ADL re-training, bed mobility, functional transfers, functional mobility, safety and fall prevention, balance and endurance activities, instruction in energy conservation principles, and patient/family education. Patient and/or family understands diagnosis, prognosis, education and plan of care. Pt/family verbalized understanding.      Time Code treatment minutes: [facility] OTR/L #[id number]
be present.
no rales. She exhibits no tenderness. Abdominal: Soft. Bowel sounds are normal. She exhibits no distension. There is tenderness (diffuse). There is no rebound and no guarding. Right flank drainage catheter (right kidney). Musculoskeletal: Normal range of motion. She exhibits tenderness (throughout but there is increased swelling of the left leg compared to the right leg.). She exhibits no edema. Lymphadenopathy:     She has no cervical adenopathy. Neurological: She is alert and oriented to person, place, and time. No cranial nerve deficit. Skin: Skin is warm and dry. Capillary refill takes less than 2 seconds. No rash noted. She is not diaphoretic. No erythema. There is pallor. Psychiatric: She has a normal mood and affect. Her behavior is normal.   Vitals reviewed. Leck Kill Symptom AssessmentScore  Leck Kill Score    Pain Score 2-3   TirednessScore 3   Nausea Score 0   Depression Score 3   AnxietyScore 0   Drowsiness Score 0   Anorexia Score (0= eating well, 10= not eating) 0   Wellbeing Score  (10= worst sense of well-being) 8 - she is aware of her Stage IV Cervical cancer. Constipation    0   Dyspnea Score    (0= noshortness of breath) 0     Assessed by: patient and provider. FLACC Scale (For Pain Assessment of the Non-Verbal Patient)  N/A Patient is able to verbalize. Results/Verification of Data Review  Objective data reviewed: labs, images, records, medication use, vitals and chart    Data in Support of Terminal Illness:N/A Palliative Patient. Electronically signed by Teddi Cushing, DO on 11/13/2018 at 3:29 PM      Thank you for allowing Palliative Medicine to participate in the care of Carmen Singer. Note: This report was completed using computerizevoiced recognition software. Every effort has been made to ensure accuracy; however, inadvertent computerized transcription errors may be present.

## 2018-11-15 LAB
BLOOD CULTURE, ROUTINE: NORMAL
CULTURE, BLOOD 2: NORMAL

## 2018-12-15 ENCOUNTER — HOSPITAL ENCOUNTER (EMERGENCY)
Age: 58
Discharge: HOME | End: 2018-12-15
Attending: EMERGENCY MEDICINE
Payer: OTHER GOVERNMENT

## 2018-12-15 ENCOUNTER — APPOINTMENT (OUTPATIENT)
Dept: CT IMAGING | Age: 58
End: 2018-12-15
Payer: OTHER GOVERNMENT

## 2018-12-15 VITALS
TEMPERATURE: 98 F | BODY MASS INDEX: 16.64 KG/M2 | WEIGHT: 100 LBS | HEART RATE: 60 BPM | DIASTOLIC BLOOD PRESSURE: 61 MMHG | OXYGEN SATURATION: 99 % | RESPIRATION RATE: 14 BRPM | SYSTOLIC BLOOD PRESSURE: 132 MMHG

## 2018-12-15 DIAGNOSIS — Z48.89 ENCOUNTER FOR POSTOPERATIVE WOUND CHECK: Primary | ICD-10-CM

## 2018-12-15 DIAGNOSIS — N99.528 NEPHROSTOMY COMPLICATION (HCC): ICD-10-CM

## 2018-12-15 LAB
ANION GAP SERPL CALCULATED.3IONS-SCNC: 10 MMOL/L (ref 7–16)
BASOPHILS ABSOLUTE: 0 E9/L (ref 0–0.2)
BASOPHILS RELATIVE PERCENT: 0.5 % (ref 0–2)
BUN BLDV-MCNC: 19 MG/DL (ref 8–23)
CALCIUM SERPL-MCNC: 8.8 MG/DL (ref 8.6–10.2)
CHLORIDE BLD-SCNC: 101 MMOL/L (ref 98–107)
CO2: 24 MMOL/L (ref 22–29)
CREAT SERPL-MCNC: 1.3 MG/DL (ref 0.5–1)
EOSINOPHILS ABSOLUTE: 0.33 E9/L (ref 0.05–0.5)
EOSINOPHILS RELATIVE PERCENT: 5.3 % (ref 0–6)
GFR AFRICAN AMERICAN: 50
GFR NON-AFRICAN AMERICAN: 42 ML/MIN/1.73
GLUCOSE BLD-MCNC: 112 MG/DL (ref 74–99)
HCT VFR BLD CALC: 29.7 % (ref 34–48)
HEMOGLOBIN: 8.9 G/DL (ref 11.5–15.5)
HYPOCHROMIA: ABNORMAL
LACTIC ACID: 1.3 MMOL/L (ref 0.5–2.2)
LYMPHOCYTES ABSOLUTE: 0.5 E9/L (ref 1.5–4)
LYMPHOCYTES RELATIVE PERCENT: 8 % (ref 20–42)
MCH RBC QN AUTO: 29.3 PG (ref 26–35)
MCHC RBC AUTO-ENTMCNC: 30 % (ref 32–34.5)
MCV RBC AUTO: 97.7 FL (ref 80–99.9)
MONOCYTES ABSOLUTE: 0.5 E9/L (ref 0.1–0.95)
MONOCYTES RELATIVE PERCENT: 8 % (ref 2–12)
NEUTROPHILS ABSOLUTE: 4.9 E9/L (ref 1.8–7.3)
NEUTROPHILS RELATIVE PERCENT: 78.8 % (ref 43–80)
PDW BLD-RTO: 16.6 FL (ref 11.5–15)
PLATELET # BLD: 272 E9/L (ref 130–450)
PMV BLD AUTO: 10 FL (ref 7–12)
POTASSIUM SERPL-SCNC: 4.5 MMOL/L (ref 3.5–5)
RBC # BLD: 3.04 E12/L (ref 3.5–5.5)
SODIUM BLD-SCNC: 135 MMOL/L (ref 132–146)
WBC # BLD: 6.2 E9/L (ref 4.5–11.5)

## 2018-12-15 PROCEDURE — 99283 EMERGENCY DEPT VISIT LOW MDM: CPT

## 2018-12-15 PROCEDURE — 83605 ASSAY OF LACTIC ACID: CPT

## 2018-12-15 PROCEDURE — 6370000000 HC RX 637 (ALT 250 FOR IP): Performed by: STUDENT IN AN ORGANIZED HEALTH CARE EDUCATION/TRAINING PROGRAM

## 2018-12-15 PROCEDURE — 36415 COLL VENOUS BLD VENIPUNCTURE: CPT

## 2018-12-15 PROCEDURE — 2709999900 CT ABDOMEN PELVIS WO CONTRAST

## 2018-12-15 PROCEDURE — 85025 COMPLETE CBC W/AUTO DIFF WBC: CPT

## 2018-12-15 PROCEDURE — 80048 BASIC METABOLIC PNL TOTAL CA: CPT

## 2018-12-15 RX ORDER — FENTANYL CITRATE 50 UG/ML
25 INJECTION, SOLUTION INTRAMUSCULAR; INTRAVENOUS ONCE
Status: DISCONTINUED | OUTPATIENT
Start: 2018-12-15 | End: 2018-12-15

## 2018-12-15 RX ORDER — HYDROCODONE BITARTRATE AND ACETAMINOPHEN 5; 325 MG/1; MG/1
2 TABLET ORAL ONCE
Status: COMPLETED | OUTPATIENT
Start: 2018-12-15 | End: 2018-12-15

## 2018-12-15 RX ADMIN — HYDROCODONE BITARTRATE AND ACETAMINOPHEN 2 TABLET: 5; 325 TABLET ORAL at 12:43

## 2018-12-15 ASSESSMENT — ENCOUNTER SYMPTOMS
VOMITING: 0
WHEEZING: 0
COLOR CHANGE: 0
CONSTIPATION: 0
COUGH: 0
ABDOMINAL PAIN: 0
SHORTNESS OF BREATH: 0
DIARRHEA: 0
NAUSEA: 0

## 2018-12-15 ASSESSMENT — PAIN DESCRIPTION - LOCATION: LOCATION: BACK

## 2018-12-15 ASSESSMENT — PAIN DESCRIPTION - DESCRIPTORS: DESCRIPTORS: SORE;ACHING

## 2018-12-15 ASSESSMENT — PAIN DESCRIPTION - PAIN TYPE: TYPE: ACUTE PAIN

## 2018-12-15 ASSESSMENT — PAIN SCALES - GENERAL
PAINLEVEL_OUTOF10: 10
PAINLEVEL_OUTOF10: 9

## 2018-12-15 NOTE — ED PROVIDER NOTES
Patient is a 69-year-old female with history of cervical cancer, end-stage renal disease, squamous cell carcinoma, hypertension, COPD, hyperlipidemia, presents the ED with nephrostomy tube check. She has had these symptoms for the last year. She states that she has had some drainage on her right nephrostomy tube site. She states that the reason why she has it in the 1st place, is that she has had stents placed, which repeatedly came out. She states that she has had a decrease in her urinary output from the nephrostomy tubing site. She currently is complaining of some mild back pain, and some drainage from the site itself. No fevers or chills. No shortness of breath or chest pain. Review of Systems   Constitutional: Negative for chills and fever. Respiratory: Negative for cough, shortness of breath and wheezing. Cardiovascular: Negative for chest pain and palpitations. Gastrointestinal: Negative for abdominal pain, constipation, diarrhea, nausea and vomiting. Genitourinary: Negative for dysuria and hematuria. Musculoskeletal: Negative for neck pain and neck stiffness. Skin: Positive for wound (Right-sided nephrostomy tube. ). Negative for color change, pallor and rash. Neurological: Negative for dizziness, syncope, light-headedness, numbness and headaches. Psychiatric/Behavioral: Negative for confusion and decreased concentration. The patient is not nervous/anxious. Physical Exam   Constitutional: She is oriented to person, place, and time. She appears well-developed and well-nourished. No distress. HENT:   Head: Normocephalic and atraumatic. Right Ear: External ear normal.   Left Ear: External ear normal.   Mouth/Throat: No oropharyngeal exudate. Eyes: Pupils are equal, round, and reactive to light. EOM are normal.   Neck: Normal range of motion. Cardiovascular: Normal rate, regular rhythm, normal heart sounds and intact distal pulses.   Exam reveals no gallop and no

## 2018-12-15 NOTE — ED NOTES
carcinoma. Past Surgical History:  has a past surgical history that includes Ureter stent placement; Tonsillectomy; Mouth surgery; Dilation and curettage of uterus; hernia repair; laparoscopy; other surgical history (06/27/2017); and other surgical history (N/A, 08/04/2017). Social History:  reports that she has been smoking Cigarettes. She has a 40.00 pack-year smoking history. She has never used smokeless tobacco. She reports that she uses drugs, including Marijuana. She reports that she does not drink alcohol. Family History: family history is not on file. The patients home medications have been reviewed. Allergies: Latex; Lactose intolerance (gi);  Other; Asa [aspirin]; and Contrast [barium-containing compounds]  --------------------------------------------------------------------------------------------------------------  Nursing notes and vital signs reviewed          On My Exam:    Constitutional/General: Alert and oriented x3  Right with neph tube in place, sutures in place but the dressing is loose  Site is clean and dry  No redness or drainage        Medical Decision Making:  CT demonstrates tube in the appropriate location  Advised to follow up as outpatient for tube change when needed                        Juventino Cho MD  12/15/18 9978

## 2018-12-19 RX ORDER — SODIUM CHLORIDE 0.9 % (FLUSH) 0.9 %
10 SYRINGE (ML) INJECTION PRN
Status: CANCELLED | OUTPATIENT
Start: 2018-12-19

## 2018-12-20 ENCOUNTER — HOSPITAL ENCOUNTER (OUTPATIENT)
Dept: INTERVENTIONAL RADIOLOGY/VASCULAR | Age: 58
Discharge: HOME | End: 2018-12-22
Payer: OTHER GOVERNMENT

## 2018-12-20 VITALS
HEART RATE: 74 BPM | OXYGEN SATURATION: 96 % | DIASTOLIC BLOOD PRESSURE: 67 MMHG | SYSTOLIC BLOOD PRESSURE: 111 MMHG | RESPIRATION RATE: 14 BRPM

## 2018-12-20 DIAGNOSIS — N18.4 ACUTE RENAL FAILURE SUPERIMPOSED ON STAGE 4 CHRONIC KIDNEY DISEASE, UNSPECIFIED ACUTE RENAL FAILURE TYPE (HCC): ICD-10-CM

## 2018-12-20 DIAGNOSIS — N17.9 ACUTE RENAL FAILURE SUPERIMPOSED ON STAGE 4 CHRONIC KIDNEY DISEASE, UNSPECIFIED ACUTE RENAL FAILURE TYPE (HCC): ICD-10-CM

## 2018-12-20 DIAGNOSIS — R10.30 LOWER ABDOMINAL PAIN: Primary | ICD-10-CM

## 2018-12-20 PROCEDURE — 6360000002 HC RX W HCPCS: Performed by: RADIOLOGY

## 2018-12-20 PROCEDURE — 7100000010 HC PHASE II RECOVERY - FIRST 15 MIN

## 2018-12-20 PROCEDURE — 2709999900 IR GUIDED NEPHROSTOMY CATH EXCHANGE

## 2018-12-20 PROCEDURE — 2500000003 HC RX 250 WO HCPCS: Performed by: RADIOLOGY

## 2018-12-20 PROCEDURE — 7100000011 HC PHASE II RECOVERY - ADDTL 15 MIN

## 2018-12-20 PROCEDURE — 6360000004 HC RX CONTRAST MEDICATION: Performed by: RADIOLOGY

## 2018-12-20 PROCEDURE — 50435 EXCHANGE NEPHROSTOMY CATH: CPT | Performed by: RADIOLOGY

## 2018-12-20 PROCEDURE — 2580000003 HC RX 258: Performed by: RADIOLOGY

## 2018-12-20 RX ORDER — SODIUM CHLORIDE 0.9 % (FLUSH) 0.9 %
10 SYRINGE (ML) INJECTION PRN
Status: DISCONTINUED | OUTPATIENT
Start: 2018-12-20 | End: 2018-12-23 | Stop reason: HOSPADM

## 2018-12-20 RX ORDER — FENTANYL CITRATE 50 UG/ML
50 INJECTION, SOLUTION INTRAMUSCULAR; INTRAVENOUS ONCE
Status: COMPLETED | OUTPATIENT
Start: 2018-12-20 | End: 2018-12-20

## 2018-12-20 RX ORDER — MIDAZOLAM HYDROCHLORIDE 1 MG/ML
1 INJECTION INTRAMUSCULAR; INTRAVENOUS ONCE
Status: COMPLETED | OUTPATIENT
Start: 2018-12-20 | End: 2018-12-20

## 2018-12-20 RX ADMIN — Medication 10 ML: at 12:10

## 2018-12-20 RX ADMIN — LIDOCAINE HYDROCHLORIDE 4 ML: 20 INJECTION, SOLUTION INFILTRATION; PERINEURAL at 12:37

## 2018-12-20 RX ADMIN — FENTANYL CITRATE 50 MCG: 50 INJECTION, SOLUTION INTRAMUSCULAR; INTRAVENOUS at 12:07

## 2018-12-20 RX ADMIN — IOVERSOL 15 ML: 678 INJECTION INTRA-ARTERIAL; INTRAVENOUS at 12:46

## 2018-12-20 RX ADMIN — MIDAZOLAM HYDROCHLORIDE 1 MG: 2 INJECTION, SOLUTION INTRAMUSCULAR; INTRAVENOUS at 11:56

## 2018-12-20 ASSESSMENT — PAIN SCALES - GENERAL
PAINLEVEL_OUTOF10: 8
PAINLEVEL_OUTOF10: 7

## 2018-12-20 NOTE — H&P
INTERVENTIONAL RADIOLOGY PRE-OPERATIVE HISTORY AND PHYSICAL EXAM    DIAGNOSIS:    Patient Active Problem List   Diagnosis    Malignant neoplasm of exocervix ([address]    Hematuria    HTN (hypertension)    Acute blood loss anemia    Acute renal failure superimposed on stage 4 chronic kidney disease (HCC)    Lower abdominal pain    Palliative care encounter    Cancer associated pain    Goals of care, counseling/discussion       CHIEF COMPLAINT: Low output from right nephrostomy catheter    HISTORY OF PRESENT ILLNESS: Edy Harmon is a 59 y.o. female with a right nephrostomy catheter and left ureteral stent. The patient reports minimal output from the nephrostomy. Right nephrostogram performed this morning shows catheter retracted into calyx and pigtail in a tight loop. Current Outpatient Prescriptions:     amLODIPine (NORVASC) 5 MG tablet, Take 1 tablet by mouth daily, Disp: 30 tablet, Rfl: 0    metoclopramide (REGLAN) 5 MG/ML injection, Infuse 2 mLs intravenously every 6 hours as needed (or vomiting.), Disp: 2 mL, Rfl: 0    opium-belladonna (B&O SUPPRETTES) 16.2-60 MG suppository, Place 1 suppository rectally 2 times daily as needed for Pain for up to 7 days. ., Disp: 5 suppository, Rfl: 0    gabapentin (NEURONTIN) 600 MG tablet, Take 1 tablet by mouth 3 times daily. ., Disp: , Rfl:     sertraline (ZOLOFT) 50 MG tablet, Take 50 mg by mouth daily, Disp: , Rfl:     fentaNYL (DURAGESIC) 100 MCG/HR, Place 1 patch onto the skin every 72 hours. ., Disp: , Rfl:     methadone (DOLOPHINE) 10 MG tablet, Take 10 mg by mouth every 8 hours as needed for Pain. ., Disp: , Rfl:     OXYBUTYNIN CHLORIDE PO, Take by mouth, Disp: , Rfl:     Ergocalciferol (VITAMIN D2) 2000 units TABS, Take 1 tablet by mouth , Disp: , Rfl:     docusate sodium (COLACE) 100 MG capsule, Take 100 mg by mouth 2 times daily, Disp: , Rfl:     acetaminophen (TYLENOL) 325 MG tablet, Take 650 mg by mouth every 6 hours as needed for Pain, Disp: , WBC 6.2 12/15/2018    RBC 3.04 12/15/2018    HGB 8.9 12/15/2018    HCT 29.7 12/15/2018     12/15/2018    MCV 97.7 12/15/2018    MCH 29.3 12/15/2018    MCHC 30.0 12/15/2018    RDW 16.6 12/15/2018    LYMPHOPCT 8.0 12/15/2018    MONOPCT 8.0 12/15/2018    BASOPCT 0.5 12/15/2018    MONOSABS 0.50 12/15/2018    LYMPHSABS 0.50 12/15/2018    EOSABS 0.33 12/15/2018    BASOSABS 0.00 12/15/2018     Platelets:    Lab Results   Component Value Date     12/15/2018     BUN/Creatinine:    Lab Results   Component Value Date    BUN 19 12/15/2018    CREATININE 1.3 12/15/2018       IMAGING:  As per HPI. ASSESSMENT AND PLAN:  1. Right nephrostomy catheter exchange. 2.  Informed consent was obtained for the procedure. The details of the procedure, as well as its risks, benefits, and alternatives, were discussed in detail. The patient indicated understanding and gave permission proceed.     Electronically signed by Denisha Winston MD on 12/20/2018 at 11:45 AM

## 2019-03-03 ENCOUNTER — HOSPITAL ENCOUNTER (INPATIENT)
Age: 59
LOS: 9 days | Discharge: HOME | DRG: 480 | End: 2019-03-13
Attending: EMERGENCY MEDICINE | Admitting: HOSPITALIST
Payer: OTHER GOVERNMENT

## 2019-03-03 ENCOUNTER — APPOINTMENT (OUTPATIENT)
Dept: ULTRASOUND IMAGING | Age: 59
DRG: 480 | End: 2019-03-03
Payer: OTHER GOVERNMENT

## 2019-03-03 ENCOUNTER — APPOINTMENT (OUTPATIENT)
Dept: GENERAL RADIOLOGY | Age: 59
DRG: 480 | End: 2019-03-03
Payer: OTHER GOVERNMENT

## 2019-03-03 ENCOUNTER — APPOINTMENT (OUTPATIENT)
Dept: CT IMAGING | Age: 59
DRG: 480 | End: 2019-03-03
Payer: OTHER GOVERNMENT

## 2019-03-03 DIAGNOSIS — K62.5 BRBPR (BRIGHT RED BLOOD PER RECTUM): ICD-10-CM

## 2019-03-03 DIAGNOSIS — S72.001A CLOSED FRACTURE OF NECK OF RIGHT FEMUR, INITIAL ENCOUNTER (HCC): Primary | ICD-10-CM

## 2019-03-03 DIAGNOSIS — R52 PAIN: ICD-10-CM

## 2019-03-03 LAB
ALBUMIN SERPL-MCNC: 3.8 G/DL (ref 3.5–5.2)
ALP BLD-CCNC: 59 U/L (ref 35–104)
ALT SERPL-CCNC: 12 U/L (ref 0–32)
ANION GAP SERPL CALCULATED.3IONS-SCNC: 11 MMOL/L (ref 7–16)
AST SERPL-CCNC: 42 U/L (ref 0–31)
BASOPHILS ABSOLUTE: 0.04 E9/L (ref 0–0.2)
BASOPHILS RELATIVE PERCENT: 0.5 % (ref 0–2)
BILIRUB SERPL-MCNC: 0.2 MG/DL (ref 0–1.2)
BUN BLDV-MCNC: 17 MG/DL (ref 8–23)
CALCIUM SERPL-MCNC: 8.1 MG/DL (ref 8.6–10.2)
CHLORIDE BLD-SCNC: 98 MMOL/L (ref 98–107)
CO2: 25 MMOL/L (ref 22–29)
CREAT SERPL-MCNC: 1.6 MG/DL (ref 0.5–1)
EOSINOPHILS ABSOLUTE: 0.15 E9/L (ref 0.05–0.5)
EOSINOPHILS RELATIVE PERCENT: 1.8 % (ref 0–6)
GFR AFRICAN AMERICAN: 40
GFR NON-AFRICAN AMERICAN: 33 ML/MIN/1.73
GLUCOSE BLD-MCNC: 95 MG/DL (ref 74–99)
HCT VFR BLD CALC: 38.2 % (ref 34–48)
HEMOGLOBIN: 12 G/DL (ref 11.5–15.5)
IMMATURE GRANULOCYTES #: 0.05 E9/L
IMMATURE GRANULOCYTES %: 0.6 % (ref 0–5)
LYMPHOCYTES ABSOLUTE: 0.59 E9/L (ref 1.5–4)
LYMPHOCYTES RELATIVE PERCENT: 6.9 % (ref 20–42)
MCH RBC QN AUTO: 29.3 PG (ref 26–35)
MCHC RBC AUTO-ENTMCNC: 31.4 % (ref 32–34.5)
MCV RBC AUTO: 93.4 FL (ref 80–99.9)
MONOCYTES ABSOLUTE: 0.69 E9/L (ref 0.1–0.95)
MONOCYTES RELATIVE PERCENT: 8.1 % (ref 2–12)
NEUTROPHILS ABSOLUTE: 7 E9/L (ref 1.8–7.3)
NEUTROPHILS RELATIVE PERCENT: 82.1 % (ref 43–80)
PDW BLD-RTO: 17.9 FL (ref 11.5–15)
PLATELET # BLD: 352 E9/L (ref 130–450)
PMV BLD AUTO: 10 FL (ref 7–12)
POTASSIUM REFLEX MAGNESIUM: 5.5 MMOL/L (ref 3.5–5)
RBC # BLD: 4.09 E12/L (ref 3.5–5.5)
SODIUM BLD-SCNC: 134 MMOL/L (ref 132–146)
TOTAL PROTEIN: 7.3 G/DL (ref 6.4–8.3)
TROPONIN: <0.01 NG/ML (ref 0–0.03)
WBC # BLD: 8.5 E9/L (ref 4.5–11.5)

## 2019-03-03 PROCEDURE — 73562 X-RAY EXAM OF KNEE 3: CPT

## 2019-03-03 PROCEDURE — 73552 X-RAY EXAM OF FEMUR 2/>: CPT

## 2019-03-03 PROCEDURE — 96375 TX/PRO/DX INJ NEW DRUG ADDON: CPT

## 2019-03-03 PROCEDURE — 70450 CT HEAD/BRAIN W/O DYE: CPT

## 2019-03-03 PROCEDURE — 51702 INSERT TEMP BLADDER CATH: CPT

## 2019-03-03 PROCEDURE — 85025 COMPLETE CBC W/AUTO DIFF WBC: CPT

## 2019-03-03 PROCEDURE — 80053 COMPREHEN METABOLIC PANEL: CPT

## 2019-03-03 PROCEDURE — 73502 X-RAY EXAM HIP UNI 2-3 VIEWS: CPT

## 2019-03-03 PROCEDURE — 93971 EXTREMITY STUDY: CPT

## 2019-03-03 PROCEDURE — 36415 COLL VENOUS BLD VENIPUNCTURE: CPT

## 2019-03-03 PROCEDURE — 72125 CT NECK SPINE W/O DYE: CPT

## 2019-03-03 PROCEDURE — 6360000002 HC RX W HCPCS: Performed by: EMERGENCY MEDICINE

## 2019-03-03 PROCEDURE — 99285 EMERGENCY DEPT VISIT HI MDM: CPT

## 2019-03-03 PROCEDURE — 84484 ASSAY OF TROPONIN QUANT: CPT

## 2019-03-03 PROCEDURE — 96374 THER/PROPH/DIAG INJ IV PUSH: CPT

## 2019-03-03 RX ORDER — ONDANSETRON 2 MG/ML
4 INJECTION INTRAMUSCULAR; INTRAVENOUS EVERY 6 HOURS PRN
Status: DISCONTINUED | OUTPATIENT
Start: 2019-03-03 | End: 2019-03-04 | Stop reason: SDUPTHER

## 2019-03-03 RX ORDER — FENTANYL CITRATE 50 UG/ML
50 INJECTION, SOLUTION INTRAMUSCULAR; INTRAVENOUS ONCE
Status: COMPLETED | OUTPATIENT
Start: 2019-03-03 | End: 2019-03-03

## 2019-03-03 RX ADMIN — FENTANYL CITRATE 50 MCG: 50 INJECTION INTRAMUSCULAR; INTRAVENOUS at 21:18

## 2019-03-03 RX ADMIN — ONDANSETRON HYDROCHLORIDE 4 MG: 2 SOLUTION INTRAMUSCULAR; INTRAVENOUS at 21:18

## 2019-03-03 ASSESSMENT — PAIN DESCRIPTION - ORIENTATION: ORIENTATION: RIGHT

## 2019-03-03 ASSESSMENT — PAIN DESCRIPTION - LOCATION: LOCATION: HIP;KNEE

## 2019-03-03 ASSESSMENT — PAIN SCALES - GENERAL
PAINLEVEL_OUTOF10: 10
PAINLEVEL_OUTOF10: 10

## 2019-03-03 ASSESSMENT — PAIN DESCRIPTION - PAIN TYPE: TYPE: ACUTE PAIN

## 2019-03-04 ENCOUNTER — APPOINTMENT (OUTPATIENT)
Dept: GENERAL RADIOLOGY | Age: 59
DRG: 480 | End: 2019-03-04
Payer: OTHER GOVERNMENT

## 2019-03-04 ENCOUNTER — ANESTHESIA (OUTPATIENT)
Dept: OPERATING ROOM | Age: 59
DRG: 480 | End: 2019-03-04
Payer: OTHER GOVERNMENT

## 2019-03-04 ENCOUNTER — ANESTHESIA EVENT (OUTPATIENT)
Dept: OPERATING ROOM | Age: 59
DRG: 480 | End: 2019-03-04
Payer: OTHER GOVERNMENT

## 2019-03-04 VITALS — DIASTOLIC BLOOD PRESSURE: 134 MMHG | OXYGEN SATURATION: 100 % | SYSTOLIC BLOOD PRESSURE: 214 MMHG

## 2019-03-04 PROBLEM — S72.001A DISPLACED FRACTURE OF RIGHT FEMORAL NECK (HCC): Status: ACTIVE | Noted: 2019-03-04

## 2019-03-04 LAB
ABO/RH: NORMAL
ANTIBODY SCREEN: NORMAL
APTT: 25 SEC (ref 24.5–35.1)
INR BLD: 1
PROTHROMBIN TIME: 11.5 SEC (ref 9.3–12.4)

## 2019-03-04 PROCEDURE — 3700000000 HC ANESTHESIA ATTENDED CARE: Performed by: ORTHOPAEDIC SURGERY

## 2019-03-04 PROCEDURE — C1713 ANCHOR/SCREW BN/BN,TIS/BN: HCPCS | Performed by: ORTHOPAEDIC SURGERY

## 2019-03-04 PROCEDURE — 73502 X-RAY EXAM HIP UNI 2-3 VIEWS: CPT

## 2019-03-04 PROCEDURE — 2580000003 HC RX 258: Performed by: FAMILY MEDICINE

## 2019-03-04 PROCEDURE — 72170 X-RAY EXAM OF PELVIS: CPT

## 2019-03-04 PROCEDURE — 6370000000 HC RX 637 (ALT 250 FOR IP): Performed by: STUDENT IN AN ORGANIZED HEALTH CARE EDUCATION/TRAINING PROGRAM

## 2019-03-04 PROCEDURE — 6360000002 HC RX W HCPCS: Performed by: STUDENT IN AN ORGANIZED HEALTH CARE EDUCATION/TRAINING PROGRAM

## 2019-03-04 PROCEDURE — 6360000002 HC RX W HCPCS: Performed by: ANESTHESIOLOGY

## 2019-03-04 PROCEDURE — 2580000003 HC RX 258

## 2019-03-04 PROCEDURE — 2500000003 HC RX 250 WO HCPCS

## 2019-03-04 PROCEDURE — 99222 1ST HOSP IP/OBS MODERATE 55: CPT | Performed by: ORTHOPAEDIC SURGERY

## 2019-03-04 PROCEDURE — 3600000005 HC SURGERY LEVEL 5 BASE: Performed by: ORTHOPAEDIC SURGERY

## 2019-03-04 PROCEDURE — 3209999900 FLUORO FOR SURGICAL PROCEDURES

## 2019-03-04 PROCEDURE — 7100000001 HC PACU RECOVERY - ADDTL 15 MIN: Performed by: ORTHOPAEDIC SURGERY

## 2019-03-04 PROCEDURE — 71045 X-RAY EXAM CHEST 1 VIEW: CPT

## 2019-03-04 PROCEDURE — 85610 PROTHROMBIN TIME: CPT

## 2019-03-04 PROCEDURE — 6360000002 HC RX W HCPCS

## 2019-03-04 PROCEDURE — 0QS634Z REPOSITION RIGHT UPPER FEMUR WITH INTERNAL FIXATION DEVICE, PERCUTANEOUS APPROACH: ICD-10-PCS | Performed by: ORTHOPAEDIC SURGERY

## 2019-03-04 PROCEDURE — 7100000000 HC PACU RECOVERY - FIRST 15 MIN: Performed by: ORTHOPAEDIC SURGERY

## 2019-03-04 PROCEDURE — 6360000002 HC RX W HCPCS: Performed by: EMERGENCY MEDICINE

## 2019-03-04 PROCEDURE — 86901 BLOOD TYPING SEROLOGIC RH(D): CPT

## 2019-03-04 PROCEDURE — 2720000010 HC SURG SUPPLY STERILE: Performed by: ORTHOPAEDIC SURGERY

## 2019-03-04 PROCEDURE — 86900 BLOOD TYPING SEROLOGIC ABO: CPT

## 2019-03-04 PROCEDURE — 36415 COLL VENOUS BLD VENIPUNCTURE: CPT

## 2019-03-04 PROCEDURE — C1769 GUIDE WIRE: HCPCS | Performed by: ORTHOPAEDIC SURGERY

## 2019-03-04 PROCEDURE — 2709999900 HC NON-CHARGEABLE SUPPLY: Performed by: ORTHOPAEDIC SURGERY

## 2019-03-04 PROCEDURE — 85730 THROMBOPLASTIN TIME PARTIAL: CPT

## 2019-03-04 PROCEDURE — 86850 RBC ANTIBODY SCREEN: CPT

## 2019-03-04 PROCEDURE — 3700000001 HC ADD 15 MINUTES (ANESTHESIA): Performed by: ORTHOPAEDIC SURGERY

## 2019-03-04 PROCEDURE — 3600000015 HC SURGERY LEVEL 5 ADDTL 15MIN: Performed by: ORTHOPAEDIC SURGERY

## 2019-03-04 PROCEDURE — 27235 TREAT THIGH FRACTURE: CPT | Performed by: ORTHOPAEDIC SURGERY

## 2019-03-04 PROCEDURE — 1200000000 HC SEMI PRIVATE

## 2019-03-04 DEVICE — SCREW BNE L85MM DIA7.3MM THRD L32MM CANC S STL SELF DRL ST: Type: IMPLANTABLE DEVICE | Site: HIP | Status: FUNCTIONAL

## 2019-03-04 DEVICE — SCREW BNE L90MM DIA7.3MM THRD L16MM CANC S STL SELF DRL ST: Type: IMPLANTABLE DEVICE | Site: HIP | Status: FUNCTIONAL

## 2019-03-04 DEVICE — WASHER ORTH DIA13MM FOR CANN SCR: Type: IMPLANTABLE DEVICE | Status: FUNCTIONAL

## 2019-03-04 RX ORDER — CEFAZOLIN SODIUM 2 G/50ML
2 SOLUTION INTRAVENOUS
Status: DISPENSED | OUTPATIENT
Start: 2019-03-04 | End: 2019-03-04

## 2019-03-04 RX ORDER — OXYCODONE HYDROCHLORIDE AND ACETAMINOPHEN 5; 325 MG/1; MG/1
2 TABLET ORAL EVERY 4 HOURS PRN
Status: DISCONTINUED | OUTPATIENT
Start: 2019-03-04 | End: 2019-03-10

## 2019-03-04 RX ORDER — OXYCODONE HYDROCHLORIDE AND ACETAMINOPHEN 5; 325 MG/1; MG/1
1 TABLET ORAL EVERY 4 HOURS PRN
Status: DISCONTINUED | OUTPATIENT
Start: 2019-03-04 | End: 2019-03-10

## 2019-03-04 RX ORDER — PROMETHAZINE HYDROCHLORIDE 25 MG/ML
25 INJECTION, SOLUTION INTRAMUSCULAR; INTRAVENOUS PRN
Status: DISCONTINUED | OUTPATIENT
Start: 2019-03-04 | End: 2019-03-04 | Stop reason: HOSPADM

## 2019-03-04 RX ORDER — ACETAMINOPHEN 325 MG/1
650 TABLET ORAL EVERY 6 HOURS PRN
Status: DISCONTINUED | OUTPATIENT
Start: 2019-03-04 | End: 2019-03-10 | Stop reason: SDUPTHER

## 2019-03-04 RX ORDER — ROCURONIUM BROMIDE 10 MG/ML
INJECTION, SOLUTION INTRAVENOUS PRN
Status: DISCONTINUED | OUTPATIENT
Start: 2019-03-04 | End: 2019-03-04 | Stop reason: SDUPTHER

## 2019-03-04 RX ORDER — MORPHINE SULFATE 4 MG/ML
4 INJECTION, SOLUTION INTRAMUSCULAR; INTRAVENOUS EVERY 4 HOURS PRN
Status: DISCONTINUED | OUTPATIENT
Start: 2019-03-04 | End: 2019-03-13 | Stop reason: HOSPADM

## 2019-03-04 RX ORDER — SODIUM CHLORIDE 0.9 % (FLUSH) 0.9 %
10 SYRINGE (ML) INJECTION EVERY 12 HOURS SCHEDULED
Status: DISCONTINUED | OUTPATIENT
Start: 2019-03-04 | End: 2019-03-13 | Stop reason: HOSPADM

## 2019-03-04 RX ORDER — GABAPENTIN 600 MG/1
600 TABLET ORAL 3 TIMES DAILY
Status: DISCONTINUED | OUTPATIENT
Start: 2019-03-04 | End: 2019-03-13 | Stop reason: HOSPADM

## 2019-03-04 RX ORDER — FENTANYL CITRATE 50 UG/ML
50 INJECTION, SOLUTION INTRAMUSCULAR; INTRAVENOUS ONCE
Status: COMPLETED | OUTPATIENT
Start: 2019-03-04 | End: 2019-03-04

## 2019-03-04 RX ORDER — MORPHINE SULFATE 2 MG/ML
2 INJECTION, SOLUTION INTRAMUSCULAR; INTRAVENOUS EVERY 4 HOURS PRN
Status: DISCONTINUED | OUTPATIENT
Start: 2019-03-04 | End: 2019-03-13 | Stop reason: HOSPADM

## 2019-03-04 RX ORDER — AMLODIPINE BESYLATE 5 MG/1
5 TABLET ORAL DAILY
Status: DISCONTINUED | OUTPATIENT
Start: 2019-03-04 | End: 2019-03-13 | Stop reason: HOSPADM

## 2019-03-04 RX ORDER — LABETALOL HYDROCHLORIDE 5 MG/ML
5 INJECTION, SOLUTION INTRAVENOUS EVERY 10 MIN PRN
Status: DISCONTINUED | OUTPATIENT
Start: 2019-03-04 | End: 2019-03-04 | Stop reason: HOSPADM

## 2019-03-04 RX ORDER — NEOSTIGMINE METHYLSULFATE 1 MG/ML
INJECTION, SOLUTION INTRAVENOUS PRN
Status: DISCONTINUED | OUTPATIENT
Start: 2019-03-04 | End: 2019-03-04 | Stop reason: SDUPTHER

## 2019-03-04 RX ORDER — FENTANYL CITRATE 50 UG/ML
INJECTION, SOLUTION INTRAMUSCULAR; INTRAVENOUS PRN
Status: DISCONTINUED | OUTPATIENT
Start: 2019-03-04 | End: 2019-03-04 | Stop reason: SDUPTHER

## 2019-03-04 RX ORDER — ONDANSETRON 2 MG/ML
INJECTION INTRAMUSCULAR; INTRAVENOUS PRN
Status: DISCONTINUED | OUTPATIENT
Start: 2019-03-04 | End: 2019-03-04 | Stop reason: SDUPTHER

## 2019-03-04 RX ORDER — SODIUM CHLORIDE 9 MG/ML
INJECTION, SOLUTION INTRAVENOUS CONTINUOUS
Status: DISCONTINUED | OUTPATIENT
Start: 2019-03-04 | End: 2019-03-13 | Stop reason: HOSPADM

## 2019-03-04 RX ORDER — MIDAZOLAM HYDROCHLORIDE 1 MG/ML
INJECTION INTRAMUSCULAR; INTRAVENOUS PRN
Status: DISCONTINUED | OUTPATIENT
Start: 2019-03-04 | End: 2019-03-04 | Stop reason: SDUPTHER

## 2019-03-04 RX ORDER — DEXAMETHASONE SODIUM PHOSPHATE 10 MG/ML
INJECTION INTRAMUSCULAR; INTRAVENOUS PRN
Status: DISCONTINUED | OUTPATIENT
Start: 2019-03-04 | End: 2019-03-04 | Stop reason: SDUPTHER

## 2019-03-04 RX ORDER — ACETAMINOPHEN 325 MG/1
650 TABLET ORAL EVERY 4 HOURS PRN
Status: DISCONTINUED | OUTPATIENT
Start: 2019-03-04 | End: 2019-03-04 | Stop reason: SDUPTHER

## 2019-03-04 RX ORDER — GLYCOPYRROLATE 1 MG/5 ML
SYRINGE (ML) INTRAVENOUS PRN
Status: DISCONTINUED | OUTPATIENT
Start: 2019-03-04 | End: 2019-03-04 | Stop reason: SDUPTHER

## 2019-03-04 RX ORDER — PROPOFOL 10 MG/ML
INJECTION, EMULSION INTRAVENOUS PRN
Status: DISCONTINUED | OUTPATIENT
Start: 2019-03-04 | End: 2019-03-04 | Stop reason: SDUPTHER

## 2019-03-04 RX ORDER — ONDANSETRON 2 MG/ML
4 INJECTION INTRAMUSCULAR; INTRAVENOUS EVERY 6 HOURS PRN
Status: DISCONTINUED | OUTPATIENT
Start: 2019-03-04 | End: 2019-03-13 | Stop reason: HOSPADM

## 2019-03-04 RX ORDER — FENTANYL 100 UG/H
1 PATCH TRANSDERMAL
Status: DISCONTINUED | OUTPATIENT
Start: 2019-03-04 | End: 2019-03-13 | Stop reason: HOSPADM

## 2019-03-04 RX ORDER — SODIUM CHLORIDE 0.9 % (FLUSH) 0.9 %
10 SYRINGE (ML) INJECTION PRN
Status: DISCONTINUED | OUTPATIENT
Start: 2019-03-04 | End: 2019-03-13 | Stop reason: HOSPADM

## 2019-03-04 RX ORDER — SODIUM CHLORIDE 9 MG/ML
INJECTION, SOLUTION INTRAVENOUS CONTINUOUS PRN
Status: DISCONTINUED | OUTPATIENT
Start: 2019-03-04 | End: 2019-03-04 | Stop reason: SDUPTHER

## 2019-03-04 RX ORDER — MEPERIDINE HYDROCHLORIDE 50 MG/ML
12.5 INJECTION INTRAMUSCULAR; INTRAVENOUS; SUBCUTANEOUS EVERY 5 MIN PRN
Status: DISCONTINUED | OUTPATIENT
Start: 2019-03-04 | End: 2019-03-04 | Stop reason: HOSPADM

## 2019-03-04 RX ORDER — CEFAZOLIN SODIUM 1 G/3ML
INJECTION, POWDER, FOR SOLUTION INTRAMUSCULAR; INTRAVENOUS PRN
Status: DISCONTINUED | OUTPATIENT
Start: 2019-03-04 | End: 2019-03-04 | Stop reason: SDUPTHER

## 2019-03-04 RX ADMIN — FENTANYL CITRATE 25 MCG: 50 INJECTION, SOLUTION INTRAMUSCULAR; INTRAVENOUS at 11:35

## 2019-03-04 RX ADMIN — FENTANYL CITRATE 25 MCG: 50 INJECTION, SOLUTION INTRAMUSCULAR; INTRAVENOUS at 12:36

## 2019-03-04 RX ADMIN — PROPOFOL 30 MG: 10 INJECTION, EMULSION INTRAVENOUS at 12:05

## 2019-03-04 RX ADMIN — ROCURONIUM BROMIDE 15 MG: 10 INJECTION, SOLUTION INTRAVENOUS at 12:02

## 2019-03-04 RX ADMIN — GABAPENTIN 600 MG: 600 TABLET ORAL at 21:13

## 2019-03-04 RX ADMIN — ROCURONIUM BROMIDE 25 MG: 10 INJECTION, SOLUTION INTRAVENOUS at 11:35

## 2019-03-04 RX ADMIN — SODIUM CHLORIDE: 9 INJECTION, SOLUTION INTRAVENOUS at 21:15

## 2019-03-04 RX ADMIN — Medication 4 MG: at 02:57

## 2019-03-04 RX ADMIN — OXYCODONE AND ACETAMINOPHEN 2 TABLET: 5; 325 TABLET ORAL at 21:13

## 2019-03-04 RX ADMIN — FENTANYL CITRATE 25 MCG: 50 INJECTION, SOLUTION INTRAMUSCULAR; INTRAVENOUS at 11:25

## 2019-03-04 RX ADMIN — LIDOCAINE HYDROCHLORIDE 80 MG: 20 INJECTION, SOLUTION INTRAVENOUS at 11:35

## 2019-03-04 RX ADMIN — AMLODIPINE BESYLATE 5 MG: 5 TABLET ORAL at 14:36

## 2019-03-04 RX ADMIN — FENTANYL CITRATE 50 MCG: 50 INJECTION, SOLUTION INTRAMUSCULAR; INTRAVENOUS at 11:58

## 2019-03-04 RX ADMIN — GABAPENTIN 600 MG: 600 TABLET ORAL at 14:58

## 2019-03-04 RX ADMIN — ONDANSETRON HYDROCHLORIDE 4 MG: 2 SOLUTION INTRAMUSCULAR; INTRAVENOUS at 02:58

## 2019-03-04 RX ADMIN — PROPOFOL 100 MG: 10 INJECTION, EMULSION INTRAVENOUS at 11:35

## 2019-03-04 RX ADMIN — MIDAZOLAM HYDROCHLORIDE 1 MG: 1 INJECTION, SOLUTION INTRAMUSCULAR; INTRAVENOUS at 11:25

## 2019-03-04 RX ADMIN — Medication 4 MG: at 16:11

## 2019-03-04 RX ADMIN — HYDROMORPHONE HYDROCHLORIDE 0.5 MG: 1 INJECTION, SOLUTION INTRAMUSCULAR; INTRAVENOUS; SUBCUTANEOUS at 13:31

## 2019-03-04 RX ADMIN — SERTRALINE 50 MG: 50 TABLET, FILM COATED ORAL at 14:36

## 2019-03-04 RX ADMIN — MIDAZOLAM HYDROCHLORIDE 1 MG: 1 INJECTION, SOLUTION INTRAMUSCULAR; INTRAVENOUS at 12:36

## 2019-03-04 RX ADMIN — FENTANYL CITRATE 50 MCG: 50 INJECTION INTRAMUSCULAR; INTRAVENOUS at 02:06

## 2019-03-04 RX ADMIN — OXYCODONE AND ACETAMINOPHEN 2 TABLET: 5; 325 TABLET ORAL at 04:49

## 2019-03-04 RX ADMIN — Medication 3 MG: at 12:27

## 2019-03-04 RX ADMIN — HYDROMORPHONE HYDROCHLORIDE 0.5 MG: 1 INJECTION, SOLUTION INTRAMUSCULAR; INTRAVENOUS; SUBCUTANEOUS at 13:17

## 2019-03-04 RX ADMIN — OXYCODONE AND ACETAMINOPHEN 2 TABLET: 5; 325 TABLET ORAL at 14:35

## 2019-03-04 RX ADMIN — ONDANSETRON HYDROCHLORIDE 4 MG: 2 INJECTION, SOLUTION INTRAMUSCULAR; INTRAVENOUS at 12:23

## 2019-03-04 RX ADMIN — Medication 0.6 MG: at 12:27

## 2019-03-04 RX ADMIN — SODIUM CHLORIDE: 9 INJECTION, SOLUTION INTRAVENOUS at 12:48

## 2019-03-04 RX ADMIN — FENTANYL CITRATE 50 MCG: 50 INJECTION, SOLUTION INTRAMUSCULAR; INTRAVENOUS at 12:05

## 2019-03-04 RX ADMIN — MIDAZOLAM HYDROCHLORIDE 1 MG: 1 INJECTION, SOLUTION INTRAMUSCULAR; INTRAVENOUS at 11:31

## 2019-03-04 RX ADMIN — CEFAZOLIN 1000 MG: 1 INJECTION, POWDER, FOR SOLUTION INTRAMUSCULAR; INTRAVENOUS at 11:50

## 2019-03-04 RX ADMIN — DEXAMETHASONE SODIUM PHOSPHATE 10 MG: 10 INJECTION INTRAMUSCULAR; INTRAVENOUS at 11:40

## 2019-03-04 RX ADMIN — SODIUM CHLORIDE: 9 INJECTION, SOLUTION INTRAVENOUS at 14:24

## 2019-03-04 RX ADMIN — HYDROMORPHONE HYDROCHLORIDE 0.5 MG: 1 INJECTION, SOLUTION INTRAMUSCULAR; INTRAVENOUS; SUBCUTANEOUS at 12:54

## 2019-03-04 RX ADMIN — SODIUM CHLORIDE: 9 INJECTION, SOLUTION INTRAVENOUS at 11:25

## 2019-03-04 ASSESSMENT — PULMONARY FUNCTION TESTS
PIF_VALUE: 1
PIF_VALUE: 17
PIF_VALUE: 2
PIF_VALUE: 21
PIF_VALUE: 7
PIF_VALUE: 22
PIF_VALUE: 10
PIF_VALUE: 18
PIF_VALUE: 19
PIF_VALUE: 9
PIF_VALUE: 17
PIF_VALUE: 16
PIF_VALUE: 21
PIF_VALUE: 15
PIF_VALUE: 16
PIF_VALUE: 15
PIF_VALUE: 19
PIF_VALUE: 16
PIF_VALUE: 9
PIF_VALUE: 18
PIF_VALUE: 15
PIF_VALUE: 3
PIF_VALUE: 16
PIF_VALUE: 16
PIF_VALUE: 19
PIF_VALUE: 1
PIF_VALUE: 17
PIF_VALUE: 16
PIF_VALUE: 3
PIF_VALUE: 20
PIF_VALUE: 19
PIF_VALUE: 0
PIF_VALUE: 19
PIF_VALUE: 13
PIF_VALUE: 4
PIF_VALUE: 17
PIF_VALUE: 5
PIF_VALUE: 23
PIF_VALUE: 16
PIF_VALUE: 19
PIF_VALUE: 3
PIF_VALUE: 2
PIF_VALUE: 16
PIF_VALUE: 3
PIF_VALUE: 16
PIF_VALUE: 16
PIF_VALUE: 0
PIF_VALUE: 15
PIF_VALUE: 12
PIF_VALUE: 17
PIF_VALUE: 20
PIF_VALUE: 21
PIF_VALUE: 19
PIF_VALUE: 21
PIF_VALUE: 16
PIF_VALUE: 22
PIF_VALUE: 17
PIF_VALUE: 15
PIF_VALUE: 17
PIF_VALUE: 10
PIF_VALUE: 16
PIF_VALUE: 18
PIF_VALUE: 16
PIF_VALUE: 16
PIF_VALUE: 18
PIF_VALUE: 15

## 2019-03-04 ASSESSMENT — PAIN DESCRIPTION - PROGRESSION
CLINICAL_PROGRESSION: RAPIDLY WORSENING
CLINICAL_PROGRESSION: RAPIDLY WORSENING
CLINICAL_PROGRESSION: NOT CHANGED
CLINICAL_PROGRESSION: GRADUALLY IMPROVING
CLINICAL_PROGRESSION: GRADUALLY WORSENING

## 2019-03-04 ASSESSMENT — LIFESTYLE VARIABLES: SMOKING_STATUS: 1

## 2019-03-04 ASSESSMENT — PAIN DESCRIPTION - FREQUENCY
FREQUENCY: CONTINUOUS
FREQUENCY: CONTINUOUS
FREQUENCY: INTERMITTENT
FREQUENCY: CONTINUOUS
FREQUENCY: CONTINUOUS

## 2019-03-04 ASSESSMENT — PAIN SCALES - GENERAL
PAINLEVEL_OUTOF10: 8
PAINLEVEL_OUTOF10: 8
PAINLEVEL_OUTOF10: 10
PAINLEVEL_OUTOF10: 7
PAINLEVEL_OUTOF10: 0
PAINLEVEL_OUTOF10: 10
PAINLEVEL_OUTOF10: 10
PAINLEVEL_OUTOF10: 8
PAINLEVEL_OUTOF10: 6
PAINLEVEL_OUTOF10: 10
PAINLEVEL_OUTOF10: 10
PAINLEVEL_OUTOF10: 0
PAINLEVEL_OUTOF10: 8
PAINLEVEL_OUTOF10: 7
PAINLEVEL_OUTOF10: 8

## 2019-03-04 ASSESSMENT — PAIN DESCRIPTION - PAIN TYPE
TYPE: CHRONIC PAIN;ACUTE PAIN
TYPE: SURGICAL PAIN
TYPE: SURGICAL PAIN
TYPE: ACUTE PAIN;SURGICAL PAIN
TYPE: SURGICAL PAIN
TYPE: ACUTE PAIN;SURGICAL PAIN
TYPE: SURGICAL PAIN

## 2019-03-04 ASSESSMENT — PAIN DESCRIPTION - LOCATION
LOCATION: HIP
LOCATION: GENERALIZED;HIP;KNEE
LOCATION: HIP
LOCATION: HIP;KNEE
LOCATION: HIP

## 2019-03-04 ASSESSMENT — PAIN DESCRIPTION - ORIENTATION
ORIENTATION: RIGHT

## 2019-03-04 ASSESSMENT — PAIN DESCRIPTION - ONSET
ONSET: PROGRESSIVE
ONSET: ON-GOING
ONSET: PROGRESSIVE
ONSET: ON-GOING

## 2019-03-04 ASSESSMENT — PAIN DESCRIPTION - DESCRIPTORS
DESCRIPTORS: ACHING;DISCOMFORT
DESCRIPTORS: ACHING;CONSTANT;SHARP
DESCRIPTORS: ACHING;DISCOMFORT
DESCRIPTORS: ACHING;CONSTANT;DISCOMFORT
DESCRIPTORS: ACHING;DISCOMFORT
DESCRIPTORS: ACHING;CONSTANT;DISCOMFORT

## 2019-03-04 ASSESSMENT — PAIN - FUNCTIONAL ASSESSMENT
PAIN_FUNCTIONAL_ASSESSMENT: PREVENTS OR INTERFERES WITH ALL ACTIVE AND SOME PASSIVE ACTIVITIES
PAIN_FUNCTIONAL_ASSESSMENT: PREVENTS OR INTERFERES WITH ALL ACTIVE AND SOME PASSIVE ACTIVITIES

## 2019-03-05 ENCOUNTER — APPOINTMENT (OUTPATIENT)
Dept: GENERAL RADIOLOGY | Age: 59
DRG: 480 | End: 2019-03-05
Payer: OTHER GOVERNMENT

## 2019-03-05 ENCOUNTER — TELEPHONE (OUTPATIENT)
Dept: ORTHOPEDIC SURGERY | Age: 59
End: 2019-03-05

## 2019-03-05 LAB
ANION GAP SERPL CALCULATED.3IONS-SCNC: 14 MMOL/L (ref 7–16)
BACTERIA: ABNORMAL /HPF
BASOPHILS ABSOLUTE: 0.03 E9/L (ref 0–0.2)
BASOPHILS RELATIVE PERCENT: 0.4 % (ref 0–2)
BILIRUBIN URINE: NEGATIVE
BLOOD, URINE: ABNORMAL
BUN BLDV-MCNC: 14 MG/DL (ref 8–23)
CALCIUM SERPL-MCNC: 7 MG/DL (ref 8.6–10.2)
CHLORIDE BLD-SCNC: 105 MMOL/L (ref 98–107)
CLARITY: ABNORMAL
CO2: 20 MMOL/L (ref 22–29)
COLOR: YELLOW
CREAT SERPL-MCNC: 1.7 MG/DL (ref 0.5–1)
EOSINOPHILS ABSOLUTE: 0.09 E9/L (ref 0.05–0.5)
EOSINOPHILS RELATIVE PERCENT: 1.3 % (ref 0–6)
GFR AFRICAN AMERICAN: 37
GFR NON-AFRICAN AMERICAN: 31 ML/MIN/1.73
GLUCOSE BLD-MCNC: 87 MG/DL (ref 74–99)
GLUCOSE URINE: NEGATIVE MG/DL
HCT VFR BLD CALC: 33.9 % (ref 34–48)
HCT VFR BLD CALC: 37.6 % (ref 34–48)
HCT VFR BLD CALC: 37.9 % (ref 34–48)
HEMOGLOBIN: 10.4 G/DL (ref 11.5–15.5)
HEMOGLOBIN: 11.2 G/DL (ref 11.5–15.5)
HEMOGLOBIN: 11.3 G/DL (ref 11.5–15.5)
IMMATURE GRANULOCYTES #: 0.03 E9/L
IMMATURE GRANULOCYTES %: 0.4 % (ref 0–5)
KETONES, URINE: NEGATIVE MG/DL
LEUKOCYTE ESTERASE, URINE: ABNORMAL
LYMPHOCYTES ABSOLUTE: 0.81 E9/L (ref 1.5–4)
LYMPHOCYTES RELATIVE PERCENT: 11.6 % (ref 20–42)
MAGNESIUM: 1.9 MG/DL (ref 1.6–2.6)
MCH RBC QN AUTO: 28.7 PG (ref 26–35)
MCHC RBC AUTO-ENTMCNC: 29.8 % (ref 32–34.5)
MCV RBC AUTO: 96.2 FL (ref 80–99.9)
MONOCYTES ABSOLUTE: 0.57 E9/L (ref 0.1–0.95)
MONOCYTES RELATIVE PERCENT: 8.2 % (ref 2–12)
NEUTROPHILS ABSOLUTE: 5.45 E9/L (ref 1.8–7.3)
NEUTROPHILS RELATIVE PERCENT: 78.1 % (ref 43–80)
NITRITE, URINE: POSITIVE
PDW BLD-RTO: 17.9 FL (ref 11.5–15)
PH UA: 7 (ref 5–9)
PLATELET # BLD: 277 E9/L (ref 130–450)
PMV BLD AUTO: 8.9 FL (ref 7–12)
POTASSIUM REFLEX MAGNESIUM: 4.4 MMOL/L (ref 3.5–5)
PROTEIN UA: 100 MG/DL
RBC # BLD: 3.94 E12/L (ref 3.5–5.5)
RBC UA: >20 /HPF (ref 0–2)
SODIUM BLD-SCNC: 139 MMOL/L (ref 132–146)
SPECIFIC GRAVITY UA: 1.02 (ref 1–1.03)
UROBILINOGEN, URINE: 0.2 E.U./DL
WBC # BLD: 7 E9/L (ref 4.5–11.5)
WBC UA: ABNORMAL /HPF (ref 0–5)

## 2019-03-05 PROCEDURE — 97530 THERAPEUTIC ACTIVITIES: CPT

## 2019-03-05 PROCEDURE — 6360000002 HC RX W HCPCS: Performed by: STUDENT IN AN ORGANIZED HEALTH CARE EDUCATION/TRAINING PROGRAM

## 2019-03-05 PROCEDURE — 1200000000 HC SEMI PRIVATE

## 2019-03-05 PROCEDURE — 97161 PT EVAL LOW COMPLEX 20 MIN: CPT

## 2019-03-05 PROCEDURE — 74018 RADEX ABDOMEN 1 VIEW: CPT

## 2019-03-05 PROCEDURE — 36415 COLL VENOUS BLD VENIPUNCTURE: CPT

## 2019-03-05 PROCEDURE — 81001 URINALYSIS AUTO W/SCOPE: CPT

## 2019-03-05 PROCEDURE — 2580000003 HC RX 258: Performed by: FAMILY MEDICINE

## 2019-03-05 PROCEDURE — 85018 HEMOGLOBIN: CPT

## 2019-03-05 PROCEDURE — 85025 COMPLETE CBC W/AUTO DIFF WBC: CPT

## 2019-03-05 PROCEDURE — 85014 HEMATOCRIT: CPT

## 2019-03-05 PROCEDURE — 2580000003 HC RX 258: Performed by: STUDENT IN AN ORGANIZED HEALTH CARE EDUCATION/TRAINING PROGRAM

## 2019-03-05 PROCEDURE — 6370000000 HC RX 637 (ALT 250 FOR IP): Performed by: STUDENT IN AN ORGANIZED HEALTH CARE EDUCATION/TRAINING PROGRAM

## 2019-03-05 PROCEDURE — 83735 ASSAY OF MAGNESIUM: CPT

## 2019-03-05 PROCEDURE — 80048 BASIC METABOLIC PNL TOTAL CA: CPT

## 2019-03-05 PROCEDURE — 97165 OT EVAL LOW COMPLEX 30 MIN: CPT

## 2019-03-05 RX ORDER — PANTOPRAZOLE SODIUM 40 MG/1
40 TABLET, DELAYED RELEASE ORAL
Status: DISCONTINUED | OUTPATIENT
Start: 2019-03-06 | End: 2019-03-11

## 2019-03-05 RX ORDER — BISACODYL 10 MG
10 SUPPOSITORY, RECTAL RECTAL DAILY PRN
Status: DISCONTINUED | OUTPATIENT
Start: 2019-03-05 | End: 2019-03-13 | Stop reason: HOSPADM

## 2019-03-05 RX ADMIN — Medication 4 MG: at 10:20

## 2019-03-05 RX ADMIN — OXYCODONE AND ACETAMINOPHEN 2 TABLET: 5; 325 TABLET ORAL at 06:32

## 2019-03-05 RX ADMIN — OXYCODONE AND ACETAMINOPHEN 2 TABLET: 5; 325 TABLET ORAL at 18:02

## 2019-03-05 RX ADMIN — SODIUM CHLORIDE: 9 INJECTION, SOLUTION INTRAVENOUS at 06:30

## 2019-03-05 RX ADMIN — OXYCODONE AND ACETAMINOPHEN 2 TABLET: 5; 325 TABLET ORAL at 13:12

## 2019-03-05 RX ADMIN — GABAPENTIN 600 MG: 600 TABLET ORAL at 10:22

## 2019-03-05 RX ADMIN — Medication 10 ML: at 10:22

## 2019-03-05 RX ADMIN — ONDANSETRON HYDROCHLORIDE 4 MG: 2 SOLUTION INTRAMUSCULAR; INTRAVENOUS at 13:12

## 2019-03-05 RX ADMIN — GABAPENTIN 600 MG: 600 TABLET ORAL at 18:03

## 2019-03-05 RX ADMIN — AMLODIPINE BESYLATE 5 MG: 5 TABLET ORAL at 10:22

## 2019-03-05 RX ADMIN — ONDANSETRON HYDROCHLORIDE 4 MG: 2 SOLUTION INTRAMUSCULAR; INTRAVENOUS at 05:30

## 2019-03-05 RX ADMIN — Medication 4 MG: at 15:59

## 2019-03-05 RX ADMIN — GABAPENTIN 600 MG: 600 TABLET ORAL at 21:22

## 2019-03-05 RX ADMIN — SERTRALINE 50 MG: 50 TABLET, FILM COATED ORAL at 10:22

## 2019-03-05 RX ADMIN — Medication 4 MG: at 05:28

## 2019-03-05 ASSESSMENT — PAIN DESCRIPTION - ORIENTATION
ORIENTATION: RIGHT
ORIENTATION: RIGHT

## 2019-03-05 ASSESSMENT — PAIN DESCRIPTION - PAIN TYPE
TYPE: ACUTE PAIN;SURGICAL PAIN
TYPE: SURGICAL PAIN
TYPE: ACUTE PAIN;SURGICAL PAIN

## 2019-03-05 ASSESSMENT — PAIN DESCRIPTION - LOCATION
LOCATION: HIP;KNEE

## 2019-03-05 ASSESSMENT — PAIN SCALES - GENERAL
PAINLEVEL_OUTOF10: 9
PAINLEVEL_OUTOF10: 7
PAINLEVEL_OUTOF10: 10
PAINLEVEL_OUTOF10: 9
PAINLEVEL_OUTOF10: 10
PAINLEVEL_OUTOF10: 0
PAINLEVEL_OUTOF10: 8
PAINLEVEL_OUTOF10: 10
PAINLEVEL_OUTOF10: 10
PAINLEVEL_OUTOF10: 0

## 2019-03-05 ASSESSMENT — PAIN DESCRIPTION - ONSET
ONSET: ON-GOING
ONSET: ON-GOING

## 2019-03-05 ASSESSMENT — PAIN DESCRIPTION - DESCRIPTORS
DESCRIPTORS: ACHING;CONSTANT;DISCOMFORT

## 2019-03-05 ASSESSMENT — PAIN DESCRIPTION - PROGRESSION
CLINICAL_PROGRESSION: NOT CHANGED
CLINICAL_PROGRESSION: NOT CHANGED

## 2019-03-05 ASSESSMENT — PAIN DESCRIPTION - FREQUENCY
FREQUENCY: CONTINUOUS
FREQUENCY: CONTINUOUS

## 2019-03-05 ASSESSMENT — PAIN - FUNCTIONAL ASSESSMENT
PAIN_FUNCTIONAL_ASSESSMENT: PREVENTS OR INTERFERES SOME ACTIVE ACTIVITIES AND ADLS
PAIN_FUNCTIONAL_ASSESSMENT: PREVENTS OR INTERFERES SOME ACTIVE ACTIVITIES AND ADLS

## 2019-03-06 ENCOUNTER — APPOINTMENT (OUTPATIENT)
Dept: GENERAL RADIOLOGY | Age: 59
DRG: 480 | End: 2019-03-06
Payer: OTHER GOVERNMENT

## 2019-03-06 LAB
HCT VFR BLD CALC: 35.8 % (ref 34–48)
HCT VFR BLD CALC: 35.8 % (ref 34–48)
HCT VFR BLD CALC: 38.6 % (ref 34–48)
HCT VFR BLD CALC: 40 % (ref 34–48)
HCT VFR BLD CALC: 41.1 % (ref 34–48)
HEMOGLOBIN: 10.8 G/DL (ref 11.5–15.5)
HEMOGLOBIN: 10.9 G/DL (ref 11.5–15.5)
HEMOGLOBIN: 11.6 G/DL (ref 11.5–15.5)
HEMOGLOBIN: 11.8 G/DL (ref 11.5–15.5)
HEMOGLOBIN: 12.4 G/DL (ref 11.5–15.5)
LACTIC ACID, SEPSIS: 0.6 MMOL/L (ref 0.5–1.9)
LACTIC ACID, SEPSIS: 1 MMOL/L (ref 0.5–1.9)
PROCALCITONIN: 0.26 NG/ML (ref 0–0.08)

## 2019-03-06 PROCEDURE — 1200000000 HC SEMI PRIVATE

## 2019-03-06 PROCEDURE — 87206 SMEAR FLUORESCENT/ACID STAI: CPT

## 2019-03-06 PROCEDURE — 83605 ASSAY OF LACTIC ACID: CPT

## 2019-03-06 PROCEDURE — 36415 COLL VENOUS BLD VENIPUNCTURE: CPT

## 2019-03-06 PROCEDURE — 2580000003 HC RX 258: Performed by: STUDENT IN AN ORGANIZED HEALTH CARE EDUCATION/TRAINING PROGRAM

## 2019-03-06 PROCEDURE — 87186 SC STD MICRODIL/AGAR DIL: CPT

## 2019-03-06 PROCEDURE — 87088 URINE BACTERIA CULTURE: CPT

## 2019-03-06 PROCEDURE — 85014 HEMATOCRIT: CPT

## 2019-03-06 PROCEDURE — 71045 X-RAY EXAM CHEST 1 VIEW: CPT

## 2019-03-06 PROCEDURE — 6370000000 HC RX 637 (ALT 250 FOR IP): Performed by: FAMILY MEDICINE

## 2019-03-06 PROCEDURE — 85018 HEMOGLOBIN: CPT

## 2019-03-06 PROCEDURE — 6360000002 HC RX W HCPCS: Performed by: STUDENT IN AN ORGANIZED HEALTH CARE EDUCATION/TRAINING PROGRAM

## 2019-03-06 PROCEDURE — 87040 BLOOD CULTURE FOR BACTERIA: CPT

## 2019-03-06 PROCEDURE — 87070 CULTURE OTHR SPECIMN AEROBIC: CPT

## 2019-03-06 PROCEDURE — 6370000000 HC RX 637 (ALT 250 FOR IP): Performed by: STUDENT IN AN ORGANIZED HEALTH CARE EDUCATION/TRAINING PROGRAM

## 2019-03-06 PROCEDURE — 87205 SMEAR GRAM STAIN: CPT

## 2019-03-06 PROCEDURE — 2700000000 HC OXYGEN THERAPY PER DAY

## 2019-03-06 PROCEDURE — 2580000003 HC RX 258: Performed by: FAMILY MEDICINE

## 2019-03-06 PROCEDURE — 84145 PROCALCITONIN (PCT): CPT

## 2019-03-06 PROCEDURE — 87077 CULTURE AEROBIC IDENTIFY: CPT

## 2019-03-06 PROCEDURE — 6360000002 HC RX W HCPCS: Performed by: FAMILY MEDICINE

## 2019-03-06 RX ORDER — CEFDINIR 300 MG/1
300 CAPSULE ORAL 2 TIMES DAILY
Qty: 14 CAPSULE | Refills: 0 | Status: SHIPPED | OUTPATIENT
Start: 2019-03-06 | End: 2019-03-13

## 2019-03-06 RX ORDER — CEFTRIAXONE 1 G/1
1 INJECTION, POWDER, FOR SOLUTION INTRAMUSCULAR; INTRAVENOUS DAILY
Status: DISCONTINUED | OUTPATIENT
Start: 2019-03-07 | End: 2019-03-06 | Stop reason: SDUPTHER

## 2019-03-06 RX ORDER — GUAIFENESIN 400 MG/1
400 TABLET ORAL 3 TIMES DAILY
Status: DISCONTINUED | OUTPATIENT
Start: 2019-03-06 | End: 2019-03-13 | Stop reason: HOSPADM

## 2019-03-06 RX ORDER — SODIUM CHLORIDE 0.9 % (FLUSH) 0.9 %
10 SYRINGE (ML) INJECTION EVERY 12 HOURS SCHEDULED
Status: DISCONTINUED | OUTPATIENT
Start: 2019-03-06 | End: 2019-03-06 | Stop reason: SDUPTHER

## 2019-03-06 RX ORDER — SODIUM CHLORIDE 0.9 % (FLUSH) 0.9 %
10 SYRINGE (ML) INJECTION PRN
Status: DISCONTINUED | OUTPATIENT
Start: 2019-03-06 | End: 2019-03-06 | Stop reason: SDUPTHER

## 2019-03-06 RX ADMIN — GABAPENTIN 600 MG: 600 TABLET ORAL at 10:16

## 2019-03-06 RX ADMIN — CEFTRIAXONE SODIUM 1 G: 1 INJECTION, POWDER, FOR SOLUTION INTRAMUSCULAR; INTRAVENOUS at 18:48

## 2019-03-06 RX ADMIN — SERTRALINE 50 MG: 50 TABLET, FILM COATED ORAL at 10:19

## 2019-03-06 RX ADMIN — SODIUM CHLORIDE: 9 INJECTION, SOLUTION INTRAVENOUS at 20:29

## 2019-03-06 RX ADMIN — Medication 10 ML: at 01:05

## 2019-03-06 RX ADMIN — OXYCODONE AND ACETAMINOPHEN 2 TABLET: 5; 325 TABLET ORAL at 10:14

## 2019-03-06 RX ADMIN — ONDANSETRON HYDROCHLORIDE 4 MG: 2 SOLUTION INTRAMUSCULAR; INTRAVENOUS at 20:33

## 2019-03-06 RX ADMIN — Medication 2 MG: at 20:28

## 2019-03-06 RX ADMIN — AMLODIPINE BESYLATE 5 MG: 5 TABLET ORAL at 10:17

## 2019-03-06 RX ADMIN — ONDANSETRON HYDROCHLORIDE 4 MG: 2 SOLUTION INTRAMUSCULAR; INTRAVENOUS at 12:19

## 2019-03-06 RX ADMIN — GABAPENTIN 600 MG: 600 TABLET ORAL at 20:28

## 2019-03-06 RX ADMIN — Medication 4 MG: at 04:03

## 2019-03-06 RX ADMIN — GUAIFENESIN 400 MG: 400 TABLET ORAL at 20:28

## 2019-03-06 RX ADMIN — Medication 4 MG: at 00:00

## 2019-03-06 ASSESSMENT — PAIN SCALES - GENERAL
PAINLEVEL_OUTOF10: 10
PAINLEVEL_OUTOF10: 0
PAINLEVEL_OUTOF10: 0
PAINLEVEL_OUTOF10: 10
PAINLEVEL_OUTOF10: 10
PAINLEVEL_OUTOF10: 0
PAINLEVEL_OUTOF10: 6
PAINLEVEL_OUTOF10: 9
PAINLEVEL_OUTOF10: 10

## 2019-03-06 ASSESSMENT — PAIN DESCRIPTION - FREQUENCY
FREQUENCY: CONTINUOUS

## 2019-03-06 ASSESSMENT — PAIN DESCRIPTION - DESCRIPTORS
DESCRIPTORS: ACHING;CONSTANT;DISCOMFORT
DESCRIPTORS: ACHING
DESCRIPTORS: ACHING;CONSTANT;DISCOMFORT
DESCRIPTORS: ACHING
DESCRIPTORS: ACHING

## 2019-03-06 ASSESSMENT — PAIN DESCRIPTION - LOCATION
LOCATION: HIP
LOCATION: BACK;HIP
LOCATION: HIP

## 2019-03-06 ASSESSMENT — PAIN DESCRIPTION - PAIN TYPE
TYPE: ACUTE PAIN
TYPE: ACUTE PAIN
TYPE: SURGICAL PAIN
TYPE: ACUTE PAIN;SURGICAL PAIN
TYPE: SURGICAL PAIN

## 2019-03-06 ASSESSMENT — PAIN DESCRIPTION - ORIENTATION
ORIENTATION: RIGHT

## 2019-03-06 ASSESSMENT — PAIN SCALES - WONG BAKER
WONGBAKER_NUMERICALRESPONSE: 0
WONGBAKER_NUMERICALRESPONSE: 0

## 2019-03-06 ASSESSMENT — PAIN - FUNCTIONAL ASSESSMENT: PAIN_FUNCTIONAL_ASSESSMENT: PREVENTS OR INTERFERES SOME ACTIVE ACTIVITIES AND ADLS

## 2019-03-06 ASSESSMENT — PAIN DESCRIPTION - ONSET
ONSET: ON-GOING

## 2019-03-06 ASSESSMENT — PAIN DESCRIPTION - PROGRESSION: CLINICAL_PROGRESSION: NOT CHANGED

## 2019-03-07 ENCOUNTER — TELEPHONE (OUTPATIENT)
Dept: ORTHOPEDIC SURGERY | Age: 59
End: 2019-03-07

## 2019-03-07 LAB
CREAT SERPL-MCNC: 1.5 MG/DL (ref 0.5–1)
GFR AFRICAN AMERICAN: 43
GFR NON-AFRICAN AMERICAN: 35 ML/MIN/1.73
GRAM STAIN ORDERABLE: NORMAL
HCT VFR BLD CALC: 30.9 % (ref 34–48)
HCT VFR BLD CALC: 34.7 % (ref 34–48)
HCT VFR BLD CALC: 35.3 % (ref 34–48)
HEMOGLOBIN: 10.4 G/DL (ref 11.5–15.5)
HEMOGLOBIN: 10.7 G/DL (ref 11.5–15.5)
HEMOGLOBIN: 9.2 G/DL (ref 11.5–15.5)
VANCOMYCIN RANDOM: <4 MCG/ML (ref 5–40)

## 2019-03-07 PROCEDURE — 6360000002 HC RX W HCPCS: Performed by: STUDENT IN AN ORGANIZED HEALTH CARE EDUCATION/TRAINING PROGRAM

## 2019-03-07 PROCEDURE — 6370000000 HC RX 637 (ALT 250 FOR IP): Performed by: SURGERY

## 2019-03-07 PROCEDURE — 6370000000 HC RX 637 (ALT 250 FOR IP): Performed by: FAMILY MEDICINE

## 2019-03-07 PROCEDURE — 6370000000 HC RX 637 (ALT 250 FOR IP): Performed by: STUDENT IN AN ORGANIZED HEALTH CARE EDUCATION/TRAINING PROGRAM

## 2019-03-07 PROCEDURE — 85018 HEMOGLOBIN: CPT

## 2019-03-07 PROCEDURE — 82565 ASSAY OF CREATININE: CPT

## 2019-03-07 PROCEDURE — 36415 COLL VENOUS BLD VENIPUNCTURE: CPT

## 2019-03-07 PROCEDURE — 97535 SELF CARE MNGMENT TRAINING: CPT

## 2019-03-07 PROCEDURE — 80202 ASSAY OF VANCOMYCIN: CPT

## 2019-03-07 PROCEDURE — 2700000000 HC OXYGEN THERAPY PER DAY

## 2019-03-07 PROCEDURE — 2580000003 HC RX 258: Performed by: FAMILY MEDICINE

## 2019-03-07 PROCEDURE — 85014 HEMATOCRIT: CPT

## 2019-03-07 PROCEDURE — 6360000002 HC RX W HCPCS: Performed by: FAMILY MEDICINE

## 2019-03-07 PROCEDURE — 1200000000 HC SEMI PRIVATE

## 2019-03-07 PROCEDURE — 97530 THERAPEUTIC ACTIVITIES: CPT

## 2019-03-07 RX ADMIN — GUAIFENESIN 400 MG: 400 TABLET ORAL at 08:38

## 2019-03-07 RX ADMIN — ONDANSETRON HYDROCHLORIDE 4 MG: 2 SOLUTION INTRAMUSCULAR; INTRAVENOUS at 22:20

## 2019-03-07 RX ADMIN — SODIUM CHLORIDE: 9 INJECTION, SOLUTION INTRAVENOUS at 14:21

## 2019-03-07 RX ADMIN — AMLODIPINE BESYLATE 5 MG: 5 TABLET ORAL at 08:38

## 2019-03-07 RX ADMIN — OXYCODONE AND ACETAMINOPHEN 2 TABLET: 5; 325 TABLET ORAL at 21:14

## 2019-03-07 RX ADMIN — OXYCODONE AND ACETAMINOPHEN 2 TABLET: 5; 325 TABLET ORAL at 01:32

## 2019-03-07 RX ADMIN — GABAPENTIN 600 MG: 600 TABLET ORAL at 08:39

## 2019-03-07 RX ADMIN — GABAPENTIN 600 MG: 600 TABLET ORAL at 21:15

## 2019-03-07 RX ADMIN — GUAIFENESIN 400 MG: 400 TABLET ORAL at 14:21

## 2019-03-07 RX ADMIN — ENOXAPARIN SODIUM 30 MG: 30 INJECTION SUBCUTANEOUS at 17:07

## 2019-03-07 RX ADMIN — Medication 4 MG: at 14:21

## 2019-03-07 RX ADMIN — GUAIFENESIN 400 MG: 400 TABLET ORAL at 21:14

## 2019-03-07 RX ADMIN — GABAPENTIN 600 MG: 600 TABLET ORAL at 14:21

## 2019-03-07 RX ADMIN — CEFTRIAXONE SODIUM 1 G: 1 INJECTION, POWDER, FOR SOLUTION INTRAMUSCULAR; INTRAVENOUS at 16:57

## 2019-03-07 RX ADMIN — SERTRALINE 50 MG: 50 TABLET, FILM COATED ORAL at 08:38

## 2019-03-07 RX ADMIN — PANTOPRAZOLE SODIUM 40 MG: 40 TABLET, DELAYED RELEASE ORAL at 06:17

## 2019-03-07 RX ADMIN — OXYCODONE AND ACETAMINOPHEN 2 TABLET: 5; 325 TABLET ORAL at 08:39

## 2019-03-07 ASSESSMENT — PAIN SCALES - GENERAL
PAINLEVEL_OUTOF10: 9
PAINLEVEL_OUTOF10: 7
PAINLEVEL_OUTOF10: 10
PAINLEVEL_OUTOF10: 6
PAINLEVEL_OUTOF10: 0
PAINLEVEL_OUTOF10: 0
PAINLEVEL_OUTOF10: 10
PAINLEVEL_OUTOF10: 0
PAINLEVEL_OUTOF10: 10
PAINLEVEL_OUTOF10: 0
PAINLEVEL_OUTOF10: 0

## 2019-03-07 ASSESSMENT — PAIN DESCRIPTION - FREQUENCY
FREQUENCY: CONTINUOUS

## 2019-03-07 ASSESSMENT — PAIN DESCRIPTION - PAIN TYPE
TYPE: ACUTE PAIN

## 2019-03-07 ASSESSMENT — PAIN DESCRIPTION - DESCRIPTORS
DESCRIPTORS: ACHING;DISCOMFORT
DESCRIPTORS: ACHING;DISCOMFORT;SORE
DESCRIPTORS: ACHING;DISCOMFORT;SORE

## 2019-03-07 ASSESSMENT — PAIN DESCRIPTION - LOCATION
LOCATION: HIP

## 2019-03-07 ASSESSMENT — PAIN DESCRIPTION - ONSET
ONSET: ON-GOING

## 2019-03-07 ASSESSMENT — PAIN DESCRIPTION - ORIENTATION
ORIENTATION: RIGHT

## 2019-03-07 ASSESSMENT — PAIN DESCRIPTION - PROGRESSION
CLINICAL_PROGRESSION: NOT CHANGED

## 2019-03-08 LAB
EKG ATRIAL RATE: 69 BPM
EKG P AXIS: 76 DEGREES
EKG P-R INTERVAL: 120 MS
EKG Q-T INTERVAL: 442 MS
EKG QRS DURATION: 90 MS
EKG QTC CALCULATION (BAZETT): 473 MS
EKG R AXIS: 57 DEGREES
EKG T AXIS: 57 DEGREES
EKG VENTRICULAR RATE: 69 BPM
HCT VFR BLD CALC: 29.6 % (ref 34–48)
HCT VFR BLD CALC: 30.1 % (ref 34–48)
HCT VFR BLD CALC: 32.3 % (ref 34–48)
HEMOGLOBIN: 9 G/DL (ref 11.5–15.5)
HEMOGLOBIN: 9.3 G/DL (ref 11.5–15.5)
HEMOGLOBIN: 9.7 G/DL (ref 11.5–15.5)
URINE CULTURE, ROUTINE: NORMAL

## 2019-03-08 PROCEDURE — 6370000000 HC RX 637 (ALT 250 FOR IP): Performed by: SURGERY

## 2019-03-08 PROCEDURE — 36415 COLL VENOUS BLD VENIPUNCTURE: CPT

## 2019-03-08 PROCEDURE — 85014 HEMATOCRIT: CPT

## 2019-03-08 PROCEDURE — 1200000000 HC SEMI PRIVATE

## 2019-03-08 PROCEDURE — 6360000002 HC RX W HCPCS: Performed by: FAMILY MEDICINE

## 2019-03-08 PROCEDURE — 6360000002 HC RX W HCPCS: Performed by: STUDENT IN AN ORGANIZED HEALTH CARE EDUCATION/TRAINING PROGRAM

## 2019-03-08 PROCEDURE — 2700000000 HC OXYGEN THERAPY PER DAY

## 2019-03-08 PROCEDURE — 85018 HEMOGLOBIN: CPT

## 2019-03-08 PROCEDURE — 6370000000 HC RX 637 (ALT 250 FOR IP): Performed by: STUDENT IN AN ORGANIZED HEALTH CARE EDUCATION/TRAINING PROGRAM

## 2019-03-08 PROCEDURE — 97530 THERAPEUTIC ACTIVITIES: CPT

## 2019-03-08 PROCEDURE — 97535 SELF CARE MNGMENT TRAINING: CPT

## 2019-03-08 PROCEDURE — 2580000003 HC RX 258: Performed by: STUDENT IN AN ORGANIZED HEALTH CARE EDUCATION/TRAINING PROGRAM

## 2019-03-08 PROCEDURE — 2580000003 HC RX 258: Performed by: FAMILY MEDICINE

## 2019-03-08 PROCEDURE — 6370000000 HC RX 637 (ALT 250 FOR IP): Performed by: FAMILY MEDICINE

## 2019-03-08 RX ORDER — HYDROCODONE BITARTRATE AND ACETAMINOPHEN 5; 325 MG/1; MG/1
1 TABLET ORAL EVERY 6 HOURS PRN
Qty: 12 TABLET | Refills: 0 | Status: SHIPPED | OUTPATIENT
Start: 2019-03-08 | End: 2019-03-13 | Stop reason: HOSPADM

## 2019-03-08 RX ORDER — PROMETHAZINE HYDROCHLORIDE 25 MG/ML
6.25 INJECTION, SOLUTION INTRAMUSCULAR; INTRAVENOUS EVERY 6 HOURS PRN
Status: DISCONTINUED | OUTPATIENT
Start: 2019-03-08 | End: 2019-03-13 | Stop reason: HOSPADM

## 2019-03-08 RX ADMIN — GUAIFENESIN 400 MG: 400 TABLET ORAL at 21:43

## 2019-03-08 RX ADMIN — Medication 4 MG: at 00:27

## 2019-03-08 RX ADMIN — ONDANSETRON HYDROCHLORIDE 4 MG: 2 SOLUTION INTRAMUSCULAR; INTRAVENOUS at 18:47

## 2019-03-08 RX ADMIN — PANTOPRAZOLE SODIUM 40 MG: 40 TABLET, DELAYED RELEASE ORAL at 07:04

## 2019-03-08 RX ADMIN — SERTRALINE 50 MG: 50 TABLET, FILM COATED ORAL at 09:14

## 2019-03-08 RX ADMIN — NYSTATIN 500000 UNITS: 100000 SUSPENSION ORAL at 21:43

## 2019-03-08 RX ADMIN — NYSTATIN 500000 UNITS: 100000 SUSPENSION ORAL at 16:39

## 2019-03-08 RX ADMIN — PROMETHAZINE HYDROCHLORIDE 6.25 MG: 25 INJECTION INTRAMUSCULAR; INTRAVENOUS at 20:18

## 2019-03-08 RX ADMIN — Medication 4 MG: at 16:39

## 2019-03-08 RX ADMIN — Medication 4 MG: at 20:18

## 2019-03-08 RX ADMIN — GABAPENTIN 600 MG: 600 TABLET ORAL at 13:22

## 2019-03-08 RX ADMIN — GABAPENTIN 600 MG: 600 TABLET ORAL at 21:43

## 2019-03-08 RX ADMIN — Medication 4 MG: at 09:14

## 2019-03-08 RX ADMIN — Medication 10 ML: at 09:14

## 2019-03-08 RX ADMIN — ENOXAPARIN SODIUM 30 MG: 30 INJECTION SUBCUTANEOUS at 09:14

## 2019-03-08 RX ADMIN — GUAIFENESIN 400 MG: 400 TABLET ORAL at 13:22

## 2019-03-08 RX ADMIN — OXYCODONE AND ACETAMINOPHEN 2 TABLET: 5; 325 TABLET ORAL at 13:24

## 2019-03-08 RX ADMIN — GABAPENTIN 600 MG: 600 TABLET ORAL at 09:13

## 2019-03-08 RX ADMIN — OXYCODONE AND ACETAMINOPHEN 2 TABLET: 5; 325 TABLET ORAL at 23:03

## 2019-03-08 RX ADMIN — GUAIFENESIN 400 MG: 400 TABLET ORAL at 09:13

## 2019-03-08 RX ADMIN — NYSTATIN 500000 UNITS: 100000 SUSPENSION ORAL at 11:19

## 2019-03-08 RX ADMIN — AMLODIPINE BESYLATE 5 MG: 5 TABLET ORAL at 09:13

## 2019-03-08 RX ADMIN — CEFTRIAXONE SODIUM 1 G: 1 INJECTION, POWDER, FOR SOLUTION INTRAMUSCULAR; INTRAVENOUS at 16:38

## 2019-03-08 ASSESSMENT — PAIN DESCRIPTION - ORIENTATION
ORIENTATION: RIGHT

## 2019-03-08 ASSESSMENT — PAIN SCALES - GENERAL
PAINLEVEL_OUTOF10: 10
PAINLEVEL_OUTOF10: 8
PAINLEVEL_OUTOF10: 5
PAINLEVEL_OUTOF10: 10
PAINLEVEL_OUTOF10: 6
PAINLEVEL_OUTOF10: 10
PAINLEVEL_OUTOF10: 7
PAINLEVEL_OUTOF10: 10
PAINLEVEL_OUTOF10: 7
PAINLEVEL_OUTOF10: 6

## 2019-03-08 ASSESSMENT — PAIN DESCRIPTION - DESCRIPTORS
DESCRIPTORS: ACHING;DISCOMFORT;SORE
DESCRIPTORS: ACHING;CONSTANT;DISCOMFORT
DESCRIPTORS: ACHING;DISCOMFORT
DESCRIPTORS: ACHING;DISCOMFORT;SORE

## 2019-03-08 ASSESSMENT — PAIN - FUNCTIONAL ASSESSMENT
PAIN_FUNCTIONAL_ASSESSMENT: PREVENTS OR INTERFERES SOME ACTIVE ACTIVITIES AND ADLS

## 2019-03-08 ASSESSMENT — PAIN DESCRIPTION - LOCATION
LOCATION: HIP

## 2019-03-08 ASSESSMENT — PAIN DESCRIPTION - PAIN TYPE
TYPE: ACUTE PAIN;SURGICAL PAIN
TYPE: ACUTE PAIN

## 2019-03-08 ASSESSMENT — PAIN DESCRIPTION - FREQUENCY
FREQUENCY: CONTINUOUS

## 2019-03-08 ASSESSMENT — PAIN DESCRIPTION - ONSET
ONSET: ON-GOING

## 2019-03-08 ASSESSMENT — PAIN DESCRIPTION - PROGRESSION: CLINICAL_PROGRESSION: NOT CHANGED

## 2019-03-09 ENCOUNTER — APPOINTMENT (OUTPATIENT)
Dept: GENERAL RADIOLOGY | Age: 59
DRG: 480 | End: 2019-03-09
Payer: OTHER GOVERNMENT

## 2019-03-09 ENCOUNTER — APPOINTMENT (OUTPATIENT)
Dept: ULTRASOUND IMAGING | Age: 59
DRG: 480 | End: 2019-03-09
Payer: OTHER GOVERNMENT

## 2019-03-09 LAB
APTT: 30.8 SEC (ref 24.5–35.1)
BASOPHILS ABSOLUTE: 0.01 E9/L (ref 0–0.2)
BASOPHILS RELATIVE PERCENT: 0.2 % (ref 0–2)
CULTURE, RESPIRATORY: ABNORMAL
EOSINOPHILS ABSOLUTE: 0.22 E9/L (ref 0.05–0.5)
EOSINOPHILS RELATIVE PERCENT: 5.2 % (ref 0–6)
HCT VFR BLD CALC: 26.9 % (ref 34–48)
HCT VFR BLD CALC: 28.5 % (ref 34–48)
HCT VFR BLD CALC: 29.2 % (ref 34–48)
HCT VFR BLD CALC: 29.9 % (ref 34–48)
HEMOGLOBIN: 8.2 G/DL (ref 11.5–15.5)
HEMOGLOBIN: 8.7 G/DL (ref 11.5–15.5)
HEMOGLOBIN: 8.9 G/DL (ref 11.5–15.5)
HEMOGLOBIN: 9.2 G/DL (ref 11.5–15.5)
HYPOCHROMIA: ABNORMAL
IMMATURE GRANULOCYTES #: 0.03 E9/L
IMMATURE GRANULOCYTES %: 0.7 % (ref 0–5)
INR BLD: 1
LYMPHOCYTES ABSOLUTE: 0.42 E9/L (ref 1.5–4)
LYMPHOCYTES RELATIVE PERCENT: 9.9 % (ref 20–42)
MCH RBC QN AUTO: 29 PG (ref 26–35)
MCHC RBC AUTO-ENTMCNC: 30.5 % (ref 32–34.5)
MCV RBC AUTO: 95.1 FL (ref 80–99.9)
MONOCYTES ABSOLUTE: 0.48 E9/L (ref 0.1–0.95)
MONOCYTES RELATIVE PERCENT: 11.3 % (ref 2–12)
NEUTROPHILS ABSOLUTE: 3.1 E9/L (ref 1.8–7.3)
NEUTROPHILS RELATIVE PERCENT: 72.7 % (ref 43–80)
ORGANISM: ABNORMAL
ORGANISM: ABNORMAL
OVALOCYTES: ABNORMAL
PDW BLD-RTO: 17.8 FL (ref 11.5–15)
PLATELET # BLD: 242 E9/L (ref 130–450)
PMV BLD AUTO: 9.3 FL (ref 7–12)
POIKILOCYTES: ABNORMAL
PROTHROMBIN TIME: 11.6 SEC (ref 9.3–12.4)
RBC # BLD: 3.07 E12/L (ref 3.5–5.5)
SMEAR, RESPIRATORY: ABNORMAL
WBC # BLD: 4.3 E9/L (ref 4.5–11.5)

## 2019-03-09 PROCEDURE — 6360000002 HC RX W HCPCS: Performed by: FAMILY MEDICINE

## 2019-03-09 PROCEDURE — 6370000000 HC RX 637 (ALT 250 FOR IP): Performed by: FAMILY MEDICINE

## 2019-03-09 PROCEDURE — 85610 PROTHROMBIN TIME: CPT

## 2019-03-09 PROCEDURE — 94760 N-INVAS EAR/PLS OXIMETRY 1: CPT

## 2019-03-09 PROCEDURE — 85730 THROMBOPLASTIN TIME PARTIAL: CPT

## 2019-03-09 PROCEDURE — 6370000000 HC RX 637 (ALT 250 FOR IP): Performed by: STUDENT IN AN ORGANIZED HEALTH CARE EDUCATION/TRAINING PROGRAM

## 2019-03-09 PROCEDURE — 85025 COMPLETE CBC W/AUTO DIFF WBC: CPT

## 2019-03-09 PROCEDURE — 36415 COLL VENOUS BLD VENIPUNCTURE: CPT

## 2019-03-09 PROCEDURE — 2580000003 HC RX 258: Performed by: FAMILY MEDICINE

## 2019-03-09 PROCEDURE — 6360000002 HC RX W HCPCS: Performed by: STUDENT IN AN ORGANIZED HEALTH CARE EDUCATION/TRAINING PROGRAM

## 2019-03-09 PROCEDURE — 85018 HEMOGLOBIN: CPT

## 2019-03-09 PROCEDURE — 94640 AIRWAY INHALATION TREATMENT: CPT

## 2019-03-09 PROCEDURE — 71045 X-RAY EXAM CHEST 1 VIEW: CPT

## 2019-03-09 PROCEDURE — 85014 HEMATOCRIT: CPT

## 2019-03-09 PROCEDURE — 93971 EXTREMITY STUDY: CPT

## 2019-03-09 PROCEDURE — 1200000000 HC SEMI PRIVATE

## 2019-03-09 PROCEDURE — 6370000000 HC RX 637 (ALT 250 FOR IP): Performed by: SURGERY

## 2019-03-09 PROCEDURE — 97530 THERAPEUTIC ACTIVITIES: CPT

## 2019-03-09 RX ORDER — IPRATROPIUM BROMIDE AND ALBUTEROL SULFATE 2.5; .5 MG/3ML; MG/3ML
1 SOLUTION RESPIRATORY (INHALATION)
Status: DISCONTINUED | OUTPATIENT
Start: 2019-03-09 | End: 2019-03-13 | Stop reason: HOSPADM

## 2019-03-09 RX ADMIN — GUAIFENESIN 400 MG: 400 TABLET ORAL at 08:58

## 2019-03-09 RX ADMIN — GABAPENTIN 600 MG: 600 TABLET ORAL at 21:17

## 2019-03-09 RX ADMIN — CEFTRIAXONE SODIUM 1 G: 1 INJECTION, POWDER, FOR SOLUTION INTRAMUSCULAR; INTRAVENOUS at 17:03

## 2019-03-09 RX ADMIN — NYSTATIN 500000 UNITS: 100000 SUSPENSION ORAL at 13:27

## 2019-03-09 RX ADMIN — PANTOPRAZOLE SODIUM 40 MG: 40 TABLET, DELAYED RELEASE ORAL at 08:58

## 2019-03-09 RX ADMIN — NYSTATIN 500000 UNITS: 100000 SUSPENSION ORAL at 21:17

## 2019-03-09 RX ADMIN — Medication 4 MG: at 06:13

## 2019-03-09 RX ADMIN — GUAIFENESIN 400 MG: 400 TABLET ORAL at 15:01

## 2019-03-09 RX ADMIN — Medication 4 MG: at 11:23

## 2019-03-09 RX ADMIN — OXYCODONE AND ACETAMINOPHEN 2 TABLET: 5; 325 TABLET ORAL at 19:33

## 2019-03-09 RX ADMIN — SODIUM CHLORIDE: 9 INJECTION, SOLUTION INTRAVENOUS at 19:45

## 2019-03-09 RX ADMIN — OXYCODONE AND ACETAMINOPHEN 2 TABLET: 5; 325 TABLET ORAL at 08:49

## 2019-03-09 RX ADMIN — GABAPENTIN 600 MG: 600 TABLET ORAL at 08:58

## 2019-03-09 RX ADMIN — SERTRALINE 50 MG: 50 TABLET, FILM COATED ORAL at 08:58

## 2019-03-09 RX ADMIN — IPRATROPIUM BROMIDE AND ALBUTEROL SULFATE 1 AMPULE: .5; 3 SOLUTION RESPIRATORY (INHALATION) at 19:04

## 2019-03-09 RX ADMIN — GABAPENTIN 600 MG: 600 TABLET ORAL at 15:01

## 2019-03-09 RX ADMIN — GUAIFENESIN 400 MG: 400 TABLET ORAL at 21:17

## 2019-03-09 RX ADMIN — ONDANSETRON HYDROCHLORIDE 4 MG: 2 SOLUTION INTRAMUSCULAR; INTRAVENOUS at 21:16

## 2019-03-09 RX ADMIN — NYSTATIN 500000 UNITS: 100000 SUSPENSION ORAL at 17:08

## 2019-03-09 RX ADMIN — SODIUM CHLORIDE: 9 INJECTION, SOLUTION INTRAVENOUS at 06:20

## 2019-03-09 RX ADMIN — Medication 4 MG: at 17:03

## 2019-03-09 RX ADMIN — AMLODIPINE BESYLATE 5 MG: 5 TABLET ORAL at 08:58

## 2019-03-09 RX ADMIN — Medication 4 MG: at 21:16

## 2019-03-09 RX ADMIN — OXYCODONE AND ACETAMINOPHEN 2 TABLET: 5; 325 TABLET ORAL at 13:28

## 2019-03-09 RX ADMIN — NYSTATIN 500000 UNITS: 100000 SUSPENSION ORAL at 08:58

## 2019-03-09 RX ADMIN — IPRATROPIUM BROMIDE AND ALBUTEROL SULFATE 1 AMPULE: .5; 3 SOLUTION RESPIRATORY (INHALATION) at 12:34

## 2019-03-09 ASSESSMENT — PAIN DESCRIPTION - PROGRESSION
CLINICAL_PROGRESSION: NOT CHANGED
CLINICAL_PROGRESSION: NOT CHANGED

## 2019-03-09 ASSESSMENT — PAIN DESCRIPTION - DESCRIPTORS
DESCRIPTORS: ACHING;CONSTANT;DISCOMFORT
DESCRIPTORS: ACHING;DISCOMFORT;SORE
DESCRIPTORS: ACHING;CONSTANT;DISCOMFORT

## 2019-03-09 ASSESSMENT — PAIN SCALES - GENERAL
PAINLEVEL_OUTOF10: 8
PAINLEVEL_OUTOF10: 8
PAINLEVEL_OUTOF10: 0
PAINLEVEL_OUTOF10: 10
PAINLEVEL_OUTOF10: 8
PAINLEVEL_OUTOF10: 10
PAINLEVEL_OUTOF10: 8
PAINLEVEL_OUTOF10: 10
PAINLEVEL_OUTOF10: 8
PAINLEVEL_OUTOF10: 7
PAINLEVEL_OUTOF10: 7
PAINLEVEL_OUTOF10: 8

## 2019-03-09 ASSESSMENT — PAIN DESCRIPTION - ONSET
ONSET: ON-GOING

## 2019-03-09 ASSESSMENT — PAIN DESCRIPTION - PAIN TYPE
TYPE: ACUTE PAIN

## 2019-03-09 ASSESSMENT — PAIN DESCRIPTION - FREQUENCY
FREQUENCY: CONTINUOUS

## 2019-03-09 ASSESSMENT — PAIN DESCRIPTION - ORIENTATION
ORIENTATION: RIGHT
ORIENTATION: RIGHT;LEFT
ORIENTATION: RIGHT;LEFT

## 2019-03-09 ASSESSMENT — PAIN DESCRIPTION - LOCATION
LOCATION: HIP
LOCATION: HIP;LEG
LOCATION: HIP
LOCATION: HIP;LEG
LOCATION: HIP
LOCATION: HIP

## 2019-03-10 LAB
HCT VFR BLD CALC: 27.3 % (ref 34–48)
HCT VFR BLD CALC: 27.7 % (ref 34–48)
HCT VFR BLD CALC: 29.1 % (ref 34–48)
HCT VFR BLD CALC: 29.6 % (ref 34–48)
HCT VFR BLD CALC: 29.7 % (ref 34–48)
HEMOGLOBIN: 8.3 G/DL (ref 11.5–15.5)
HEMOGLOBIN: 8.4 G/DL (ref 11.5–15.5)
HEMOGLOBIN: 8.8 G/DL (ref 11.5–15.5)
HEMOGLOBIN: 8.8 G/DL (ref 11.5–15.5)
HEMOGLOBIN: 8.9 G/DL (ref 11.5–15.5)

## 2019-03-10 PROCEDURE — 6370000000 HC RX 637 (ALT 250 FOR IP): Performed by: FAMILY MEDICINE

## 2019-03-10 PROCEDURE — 6360000002 HC RX W HCPCS: Performed by: FAMILY MEDICINE

## 2019-03-10 PROCEDURE — 1200000000 HC SEMI PRIVATE

## 2019-03-10 PROCEDURE — 85014 HEMATOCRIT: CPT

## 2019-03-10 PROCEDURE — 85018 HEMOGLOBIN: CPT

## 2019-03-10 PROCEDURE — 94640 AIRWAY INHALATION TREATMENT: CPT

## 2019-03-10 PROCEDURE — 94645 CONT INHLJ TX EACH ADDL HOUR: CPT

## 2019-03-10 PROCEDURE — 36415 COLL VENOUS BLD VENIPUNCTURE: CPT

## 2019-03-10 PROCEDURE — 6360000002 HC RX W HCPCS: Performed by: STUDENT IN AN ORGANIZED HEALTH CARE EDUCATION/TRAINING PROGRAM

## 2019-03-10 PROCEDURE — 94760 N-INVAS EAR/PLS OXIMETRY 1: CPT

## 2019-03-10 PROCEDURE — 6370000000 HC RX 637 (ALT 250 FOR IP): Performed by: STUDENT IN AN ORGANIZED HEALTH CARE EDUCATION/TRAINING PROGRAM

## 2019-03-10 PROCEDURE — 2580000003 HC RX 258: Performed by: FAMILY MEDICINE

## 2019-03-10 PROCEDURE — 2700000000 HC OXYGEN THERAPY PER DAY

## 2019-03-10 PROCEDURE — 6370000000 HC RX 637 (ALT 250 FOR IP): Performed by: SURGERY

## 2019-03-10 RX ORDER — ACETAMINOPHEN 325 MG/1
650 TABLET ORAL EVERY 6 HOURS PRN
Status: DISCONTINUED | OUTPATIENT
Start: 2019-03-10 | End: 2019-03-13 | Stop reason: HOSPADM

## 2019-03-10 RX ORDER — HYDROCODONE BITARTRATE AND ACETAMINOPHEN 5; 325 MG/1; MG/1
2 TABLET ORAL EVERY 4 HOURS PRN
Status: DISCONTINUED | OUTPATIENT
Start: 2019-03-10 | End: 2019-03-13 | Stop reason: HOSPADM

## 2019-03-10 RX ORDER — HYDROCODONE BITARTRATE AND ACETAMINOPHEN 5; 325 MG/1; MG/1
1 TABLET ORAL EVERY 4 HOURS PRN
Status: DISCONTINUED | OUTPATIENT
Start: 2019-03-10 | End: 2019-03-13 | Stop reason: HOSPADM

## 2019-03-10 RX ADMIN — HYDROCODONE BITARTRATE AND ACETAMINOPHEN 2 TABLET: 5; 325 TABLET ORAL at 17:31

## 2019-03-10 RX ADMIN — GUAIFENESIN 400 MG: 400 TABLET ORAL at 14:16

## 2019-03-10 RX ADMIN — ONDANSETRON HYDROCHLORIDE 4 MG: 2 SOLUTION INTRAMUSCULAR; INTRAVENOUS at 20:17

## 2019-03-10 RX ADMIN — SERTRALINE 50 MG: 50 TABLET, FILM COATED ORAL at 08:47

## 2019-03-10 RX ADMIN — Medication 4 MG: at 01:51

## 2019-03-10 RX ADMIN — NYSTATIN 500000 UNITS: 100000 SUSPENSION ORAL at 17:31

## 2019-03-10 RX ADMIN — Medication 4 MG: at 14:23

## 2019-03-10 RX ADMIN — Medication 4 MG: at 23:16

## 2019-03-10 RX ADMIN — BISACODYL 10 MG: 10 SUPPOSITORY RECTAL at 20:20

## 2019-03-10 RX ADMIN — IPRATROPIUM BROMIDE AND ALBUTEROL SULFATE 1 AMPULE: .5; 3 SOLUTION RESPIRATORY (INHALATION) at 22:05

## 2019-03-10 RX ADMIN — IPRATROPIUM BROMIDE AND ALBUTEROL SULFATE 1 AMPULE: .5; 3 SOLUTION RESPIRATORY (INHALATION) at 12:25

## 2019-03-10 RX ADMIN — SODIUM CHLORIDE: 9 INJECTION, SOLUTION INTRAVENOUS at 20:25

## 2019-03-10 RX ADMIN — IPRATROPIUM BROMIDE AND ALBUTEROL SULFATE 1 AMPULE: .5; 3 SOLUTION RESPIRATORY (INHALATION) at 07:30

## 2019-03-10 RX ADMIN — PANTOPRAZOLE SODIUM 40 MG: 40 TABLET, DELAYED RELEASE ORAL at 06:22

## 2019-03-10 RX ADMIN — NYSTATIN 500000 UNITS: 100000 SUSPENSION ORAL at 08:48

## 2019-03-10 RX ADMIN — GABAPENTIN 600 MG: 600 TABLET ORAL at 08:47

## 2019-03-10 RX ADMIN — CEFTRIAXONE SODIUM 1 G: 1 INJECTION, POWDER, FOR SOLUTION INTRAMUSCULAR; INTRAVENOUS at 17:24

## 2019-03-10 RX ADMIN — SODIUM CHLORIDE: 9 INJECTION, SOLUTION INTRAVENOUS at 07:17

## 2019-03-10 RX ADMIN — GABAPENTIN 600 MG: 600 TABLET ORAL at 14:16

## 2019-03-10 RX ADMIN — GUAIFENESIN 400 MG: 400 TABLET ORAL at 08:47

## 2019-03-10 RX ADMIN — HYDROCODONE BITARTRATE AND ACETAMINOPHEN 2 TABLET: 5; 325 TABLET ORAL at 07:56

## 2019-03-10 RX ADMIN — HYDROCODONE BITARTRATE AND ACETAMINOPHEN 2 TABLET: 5; 325 TABLET ORAL at 00:40

## 2019-03-10 RX ADMIN — NYSTATIN 500000 UNITS: 100000 SUSPENSION ORAL at 14:16

## 2019-03-10 RX ADMIN — IPRATROPIUM BROMIDE AND ALBUTEROL SULFATE 1 AMPULE: .5; 3 SOLUTION RESPIRATORY (INHALATION) at 15:21

## 2019-03-10 RX ADMIN — Medication 4 MG: at 08:48

## 2019-03-10 ASSESSMENT — PAIN DESCRIPTION - ORIENTATION
ORIENTATION: RIGHT;LEFT

## 2019-03-10 ASSESSMENT — PAIN DESCRIPTION - PROGRESSION
CLINICAL_PROGRESSION: NOT CHANGED

## 2019-03-10 ASSESSMENT — PAIN DESCRIPTION - ONSET
ONSET: ON-GOING

## 2019-03-10 ASSESSMENT — PAIN SCALES - GENERAL
PAINLEVEL_OUTOF10: 0
PAINLEVEL_OUTOF10: 10
PAINLEVEL_OUTOF10: 8
PAINLEVEL_OUTOF10: 10
PAINLEVEL_OUTOF10: 10
PAINLEVEL_OUTOF10: 9
PAINLEVEL_OUTOF10: 10
PAINLEVEL_OUTOF10: 0
PAINLEVEL_OUTOF10: 0
PAINLEVEL_OUTOF10: 10
PAINLEVEL_OUTOF10: 3
PAINLEVEL_OUTOF10: 10
PAINLEVEL_OUTOF10: 8
PAINLEVEL_OUTOF10: 10
PAINLEVEL_OUTOF10: 10

## 2019-03-10 ASSESSMENT — PAIN DESCRIPTION - LOCATION
LOCATION: HIP;LEG
LOCATION: LEG;HIP;BACK
LOCATION: HIP;LEG
LOCATION: HIP;LEG
LOCATION: HIP;BACK;LEG
LOCATION: LEG;HIP;BACK
LOCATION: HIP;LEG
LOCATION: HIP;LEG;BACK

## 2019-03-10 ASSESSMENT — PAIN DESCRIPTION - DESCRIPTORS
DESCRIPTORS: ACHING;BURNING;CONSTANT
DESCRIPTORS: ACHING;DISCOMFORT;CONSTANT
DESCRIPTORS: ACHING;BURNING;CONSTANT
DESCRIPTORS: ACHING;CONSTANT;DISCOMFORT
DESCRIPTORS: ACHING;CONSTANT;DISCOMFORT
DESCRIPTORS: ACHING;DISCOMFORT;SORE
DESCRIPTORS: ACHING;DISCOMFORT;CONSTANT
DESCRIPTORS: ACHING;BURNING;CONSTANT

## 2019-03-10 ASSESSMENT — PAIN DESCRIPTION - PAIN TYPE
TYPE: ACUTE PAIN

## 2019-03-10 ASSESSMENT — PAIN DESCRIPTION - FREQUENCY
FREQUENCY: CONTINUOUS

## 2019-03-11 PROBLEM — E44.0 MODERATE PROTEIN-CALORIE MALNUTRITION (HCC): Status: ACTIVE | Noted: 2019-03-11

## 2019-03-11 LAB
BLOOD CULTURE, ROUTINE: NORMAL
CULTURE, BLOOD 2: NORMAL
HCT VFR BLD CALC: 27.9 % (ref 34–48)
HCT VFR BLD CALC: 28.1 % (ref 34–48)
HCT VFR BLD CALC: 36.9 % (ref 34–48)
HEMOGLOBIN: 11 G/DL (ref 11.5–15.5)
HEMOGLOBIN: 8.4 G/DL (ref 11.5–15.5)
HEMOGLOBIN: 8.6 G/DL (ref 11.5–15.5)

## 2019-03-11 PROCEDURE — 85014 HEMATOCRIT: CPT

## 2019-03-11 PROCEDURE — 6370000000 HC RX 637 (ALT 250 FOR IP): Performed by: STUDENT IN AN ORGANIZED HEALTH CARE EDUCATION/TRAINING PROGRAM

## 2019-03-11 PROCEDURE — 6370000000 HC RX 637 (ALT 250 FOR IP): Performed by: SURGERY

## 2019-03-11 PROCEDURE — 6370000000 HC RX 637 (ALT 250 FOR IP): Performed by: UROLOGY

## 2019-03-11 PROCEDURE — 6370000000 HC RX 637 (ALT 250 FOR IP): Performed by: FAMILY MEDICINE

## 2019-03-11 PROCEDURE — 6360000002 HC RX W HCPCS: Performed by: STUDENT IN AN ORGANIZED HEALTH CARE EDUCATION/TRAINING PROGRAM

## 2019-03-11 PROCEDURE — 36415 COLL VENOUS BLD VENIPUNCTURE: CPT

## 2019-03-11 PROCEDURE — 85018 HEMOGLOBIN: CPT

## 2019-03-11 PROCEDURE — 1200000000 HC SEMI PRIVATE

## 2019-03-11 PROCEDURE — 2700000000 HC OXYGEN THERAPY PER DAY

## 2019-03-11 PROCEDURE — 94640 AIRWAY INHALATION TREATMENT: CPT

## 2019-03-11 PROCEDURE — 97535 SELF CARE MNGMENT TRAINING: CPT

## 2019-03-11 PROCEDURE — 2580000003 HC RX 258: Performed by: FAMILY MEDICINE

## 2019-03-11 PROCEDURE — 97530 THERAPEUTIC ACTIVITIES: CPT

## 2019-03-11 PROCEDURE — 6360000002 HC RX W HCPCS: Performed by: FAMILY MEDICINE

## 2019-03-11 PROCEDURE — 2580000003 HC RX 258: Performed by: STUDENT IN AN ORGANIZED HEALTH CARE EDUCATION/TRAINING PROGRAM

## 2019-03-11 RX ORDER — PANTOPRAZOLE SODIUM 40 MG/1
40 TABLET, DELAYED RELEASE ORAL
Status: DISCONTINUED | OUTPATIENT
Start: 2019-03-11 | End: 2019-03-13 | Stop reason: HOSPADM

## 2019-03-11 RX ORDER — ATROPA BELLADONNA AND OPIUM 16.2; 6 MG/1; MG/1
60 SUPPOSITORY RECTAL EVERY 8 HOURS PRN
Status: DISCONTINUED | OUTPATIENT
Start: 2019-03-11 | End: 2019-03-13 | Stop reason: HOSPADM

## 2019-03-11 RX ORDER — PANTOPRAZOLE SODIUM 40 MG/1
40 TABLET, DELAYED RELEASE ORAL
Status: DISCONTINUED | OUTPATIENT
Start: 2019-03-11 | End: 2019-03-11

## 2019-03-11 RX ADMIN — GABAPENTIN 600 MG: 600 TABLET ORAL at 14:44

## 2019-03-11 RX ADMIN — NYSTATIN 500000 UNITS: 100000 SUSPENSION ORAL at 14:44

## 2019-03-11 RX ADMIN — IPRATROPIUM BROMIDE AND ALBUTEROL SULFATE 1 AMPULE: .5; 3 SOLUTION RESPIRATORY (INHALATION) at 08:58

## 2019-03-11 RX ADMIN — Medication 2 MG: at 12:18

## 2019-03-11 RX ADMIN — GUAIFENESIN 400 MG: 400 TABLET ORAL at 08:52

## 2019-03-11 RX ADMIN — ONDANSETRON HYDROCHLORIDE 4 MG: 2 SOLUTION INTRAMUSCULAR; INTRAVENOUS at 22:21

## 2019-03-11 RX ADMIN — GUAIFENESIN 400 MG: 400 TABLET ORAL at 20:46

## 2019-03-11 RX ADMIN — GUAIFENESIN 400 MG: 400 TABLET ORAL at 14:44

## 2019-03-11 RX ADMIN — ATROPA BELLADONNA AND OPIUM 60 MG: 16.2; 6 SUPPOSITORY RECTAL at 20:46

## 2019-03-11 RX ADMIN — HYDROCODONE BITARTRATE AND ACETAMINOPHEN 2 TABLET: 5; 325 TABLET ORAL at 00:58

## 2019-03-11 RX ADMIN — HYDROCODONE BITARTRATE AND ACETAMINOPHEN 2 TABLET: 5; 325 TABLET ORAL at 20:46

## 2019-03-11 RX ADMIN — IPRATROPIUM BROMIDE AND ALBUTEROL SULFATE 1 AMPULE: .5; 3 SOLUTION RESPIRATORY (INHALATION) at 12:49

## 2019-03-11 RX ADMIN — AMLODIPINE BESYLATE 5 MG: 5 TABLET ORAL at 08:51

## 2019-03-11 RX ADMIN — SERTRALINE 50 MG: 50 TABLET, FILM COATED ORAL at 08:51

## 2019-03-11 RX ADMIN — PANTOPRAZOLE SODIUM 40 MG: 40 TABLET, DELAYED RELEASE ORAL at 05:32

## 2019-03-11 RX ADMIN — SODIUM CHLORIDE: 9 INJECTION, SOLUTION INTRAVENOUS at 23:41

## 2019-03-11 RX ADMIN — Medication 4 MG: at 19:36

## 2019-03-11 RX ADMIN — NYSTATIN 500000 UNITS: 100000 SUSPENSION ORAL at 20:45

## 2019-03-11 RX ADMIN — Medication 4 MG: at 05:32

## 2019-03-11 RX ADMIN — HYDROCODONE BITARTRATE AND ACETAMINOPHEN 2 TABLET: 5; 325 TABLET ORAL at 08:50

## 2019-03-11 RX ADMIN — HYDROCODONE BITARTRATE AND ACETAMINOPHEN 2 TABLET: 5; 325 TABLET ORAL at 14:42

## 2019-03-11 RX ADMIN — Medication 10 ML: at 08:51

## 2019-03-11 RX ADMIN — GABAPENTIN 600 MG: 600 TABLET ORAL at 08:50

## 2019-03-11 RX ADMIN — CEFTRIAXONE SODIUM 1 G: 1 INJECTION, POWDER, FOR SOLUTION INTRAMUSCULAR; INTRAVENOUS at 16:15

## 2019-03-11 RX ADMIN — PANTOPRAZOLE SODIUM 40 MG: 40 TABLET, DELAYED RELEASE ORAL at 08:50

## 2019-03-11 RX ADMIN — GABAPENTIN 600 MG: 600 TABLET ORAL at 20:46

## 2019-03-11 ASSESSMENT — PAIN DESCRIPTION - PAIN TYPE
TYPE: ACUTE PAIN
TYPE: CHRONIC PAIN;ACUTE PAIN
TYPE: CHRONIC PAIN;SURGICAL PAIN
TYPE: ACUTE PAIN
TYPE: CHRONIC PAIN;SURGICAL PAIN
TYPE: ACUTE PAIN

## 2019-03-11 ASSESSMENT — PAIN DESCRIPTION - ONSET
ONSET: ON-GOING

## 2019-03-11 ASSESSMENT — PAIN - FUNCTIONAL ASSESSMENT
PAIN_FUNCTIONAL_ASSESSMENT: PREVENTS OR INTERFERES SOME ACTIVE ACTIVITIES AND ADLS
PAIN_FUNCTIONAL_ASSESSMENT: PREVENTS OR INTERFERES WITH ALL ACTIVE AND SOME PASSIVE ACTIVITIES
PAIN_FUNCTIONAL_ASSESSMENT: PREVENTS OR INTERFERES WITH ALL ACTIVE AND SOME PASSIVE ACTIVITIES

## 2019-03-11 ASSESSMENT — PAIN SCALES - GENERAL
PAINLEVEL_OUTOF10: 10
PAINLEVEL_OUTOF10: 10
PAINLEVEL_OUTOF10: 0
PAINLEVEL_OUTOF10: 8
PAINLEVEL_OUTOF10: 0
PAINLEVEL_OUTOF10: 4
PAINLEVEL_OUTOF10: 7
PAINLEVEL_OUTOF10: 10
PAINLEVEL_OUTOF10: 7
PAINLEVEL_OUTOF10: 10
PAINLEVEL_OUTOF10: 10
PAINLEVEL_OUTOF10: 9
PAINLEVEL_OUTOF10: 10
PAINLEVEL_OUTOF10: 10

## 2019-03-11 ASSESSMENT — PAIN DESCRIPTION - ORIENTATION
ORIENTATION: RIGHT;LEFT

## 2019-03-11 ASSESSMENT — PAIN DESCRIPTION - DESCRIPTORS
DESCRIPTORS: THROBBING;ACHING;BURNING
DESCRIPTORS: ACHING;CONSTANT;STABBING
DESCRIPTORS: ACHING;BURNING;CONSTANT
DESCRIPTORS: ACHING;CONSTANT;STABBING;THROBBING
DESCRIPTORS: THROBBING;ACHING;BURNING
DESCRIPTORS: ACHING;BURNING;CONSTANT

## 2019-03-11 ASSESSMENT — PAIN DESCRIPTION - FREQUENCY
FREQUENCY: CONTINUOUS

## 2019-03-11 ASSESSMENT — PAIN DESCRIPTION - LOCATION
LOCATION: HIP;LEG;BACK
LOCATION: LEG;HIP;BACK

## 2019-03-11 ASSESSMENT — PAIN DESCRIPTION - PROGRESSION
CLINICAL_PROGRESSION: NOT CHANGED

## 2019-03-12 LAB
HCT VFR BLD CALC: 27.1 % (ref 34–48)
HCT VFR BLD CALC: 28 % (ref 34–48)
HCT VFR BLD CALC: 29.1 % (ref 34–48)
HCT VFR BLD CALC: 29.1 % (ref 34–48)
HEMOGLOBIN: 8.3 G/DL (ref 11.5–15.5)
HEMOGLOBIN: 8.3 G/DL (ref 11.5–15.5)
HEMOGLOBIN: 8.6 G/DL (ref 11.5–15.5)
HEMOGLOBIN: 8.7 G/DL (ref 11.5–15.5)

## 2019-03-12 PROCEDURE — 94640 AIRWAY INHALATION TREATMENT: CPT

## 2019-03-12 PROCEDURE — 97530 THERAPEUTIC ACTIVITIES: CPT

## 2019-03-12 PROCEDURE — 6370000000 HC RX 637 (ALT 250 FOR IP): Performed by: UROLOGY

## 2019-03-12 PROCEDURE — 6360000002 HC RX W HCPCS: Performed by: FAMILY MEDICINE

## 2019-03-12 PROCEDURE — 85018 HEMOGLOBIN: CPT

## 2019-03-12 PROCEDURE — 6360000002 HC RX W HCPCS: Performed by: STUDENT IN AN ORGANIZED HEALTH CARE EDUCATION/TRAINING PROGRAM

## 2019-03-12 PROCEDURE — 6370000000 HC RX 637 (ALT 250 FOR IP): Performed by: STUDENT IN AN ORGANIZED HEALTH CARE EDUCATION/TRAINING PROGRAM

## 2019-03-12 PROCEDURE — 2700000000 HC OXYGEN THERAPY PER DAY

## 2019-03-12 PROCEDURE — 6370000000 HC RX 637 (ALT 250 FOR IP): Performed by: FAMILY MEDICINE

## 2019-03-12 PROCEDURE — 2580000003 HC RX 258: Performed by: FAMILY MEDICINE

## 2019-03-12 PROCEDURE — 85014 HEMATOCRIT: CPT

## 2019-03-12 PROCEDURE — 36591 DRAW BLOOD OFF VENOUS DEVICE: CPT

## 2019-03-12 PROCEDURE — 1200000000 HC SEMI PRIVATE

## 2019-03-12 PROCEDURE — 36415 COLL VENOUS BLD VENIPUNCTURE: CPT

## 2019-03-12 PROCEDURE — 2580000003 HC RX 258: Performed by: STUDENT IN AN ORGANIZED HEALTH CARE EDUCATION/TRAINING PROGRAM

## 2019-03-12 RX ADMIN — HYDROCODONE BITARTRATE AND ACETAMINOPHEN 2 TABLET: 5; 325 TABLET ORAL at 08:50

## 2019-03-12 RX ADMIN — Medication 10 ML: at 04:58

## 2019-03-12 RX ADMIN — CEFTRIAXONE SODIUM 1 G: 1 INJECTION, POWDER, FOR SOLUTION INTRAMUSCULAR; INTRAVENOUS at 17:54

## 2019-03-12 RX ADMIN — Medication 10 ML: at 08:54

## 2019-03-12 RX ADMIN — ATROPA BELLADONNA AND OPIUM 60 MG: 16.2; 6 SUPPOSITORY RECTAL at 04:58

## 2019-03-12 RX ADMIN — GUAIFENESIN 400 MG: 400 TABLET ORAL at 08:50

## 2019-03-12 RX ADMIN — IPRATROPIUM BROMIDE AND ALBUTEROL SULFATE 1 AMPULE: .5; 3 SOLUTION RESPIRATORY (INHALATION) at 19:49

## 2019-03-12 RX ADMIN — PROMETHAZINE HYDROCHLORIDE 6.25 MG: 25 INJECTION INTRAMUSCULAR; INTRAVENOUS at 21:42

## 2019-03-12 RX ADMIN — IPRATROPIUM BROMIDE AND ALBUTEROL SULFATE 1 AMPULE: .5; 3 SOLUTION RESPIRATORY (INHALATION) at 14:51

## 2019-03-12 RX ADMIN — GUAIFENESIN 400 MG: 400 TABLET ORAL at 21:36

## 2019-03-12 RX ADMIN — Medication 4 MG: at 11:16

## 2019-03-12 RX ADMIN — HYDROCODONE BITARTRATE AND ACETAMINOPHEN 2 TABLET: 5; 325 TABLET ORAL at 20:28

## 2019-03-12 RX ADMIN — Medication 4 MG: at 18:16

## 2019-03-12 RX ADMIN — GABAPENTIN 600 MG: 600 TABLET ORAL at 16:29

## 2019-03-12 RX ADMIN — ONDANSETRON HYDROCHLORIDE 4 MG: 2 SOLUTION INTRAMUSCULAR; INTRAVENOUS at 04:58

## 2019-03-12 RX ADMIN — HYDROCODONE BITARTRATE AND ACETAMINOPHEN 2 TABLET: 5; 325 TABLET ORAL at 03:37

## 2019-03-12 RX ADMIN — SODIUM CHLORIDE: 9 INJECTION, SOLUTION INTRAVENOUS at 13:38

## 2019-03-12 RX ADMIN — ONDANSETRON HYDROCHLORIDE 4 MG: 2 SOLUTION INTRAMUSCULAR; INTRAVENOUS at 18:16

## 2019-03-12 RX ADMIN — Medication 10 ML: at 02:26

## 2019-03-12 RX ADMIN — GABAPENTIN 600 MG: 600 TABLET ORAL at 21:36

## 2019-03-12 RX ADMIN — Medication 4 MG: at 23:01

## 2019-03-12 RX ADMIN — GABAPENTIN 600 MG: 600 TABLET ORAL at 08:50

## 2019-03-12 RX ADMIN — ONDANSETRON HYDROCHLORIDE 4 MG: 2 SOLUTION INTRAMUSCULAR; INTRAVENOUS at 08:50

## 2019-03-12 RX ADMIN — HYDROCODONE BITARTRATE AND ACETAMINOPHEN 2 TABLET: 5; 325 TABLET ORAL at 16:28

## 2019-03-12 RX ADMIN — NYSTATIN 500000 UNITS: 100000 SUSPENSION ORAL at 21:36

## 2019-03-12 RX ADMIN — PROMETHAZINE HYDROCHLORIDE 6.25 MG: 25 INJECTION INTRAMUSCULAR; INTRAVENOUS at 13:05

## 2019-03-12 RX ADMIN — NYSTATIN 500000 UNITS: 100000 SUSPENSION ORAL at 16:29

## 2019-03-12 RX ADMIN — Medication 10 ML: at 06:54

## 2019-03-12 RX ADMIN — PANTOPRAZOLE SODIUM 40 MG: 40 TABLET, DELAYED RELEASE ORAL at 06:54

## 2019-03-12 RX ADMIN — GUAIFENESIN 400 MG: 400 TABLET ORAL at 16:28

## 2019-03-12 RX ADMIN — NYSTATIN 500000 UNITS: 100000 SUSPENSION ORAL at 08:51

## 2019-03-12 RX ADMIN — AMLODIPINE BESYLATE 5 MG: 5 TABLET ORAL at 08:50

## 2019-03-12 RX ADMIN — SERTRALINE 50 MG: 50 TABLET, FILM COATED ORAL at 08:51

## 2019-03-12 RX ADMIN — POLYETHYLENE GLYCOL 3350, SODIUM SULFATE ANHYDROUS, SODIUM BICARBONATE, SODIUM CHLORIDE, POTASSIUM CHLORIDE 4000 ML: 236; 22.74; 6.74; 5.86; 2.97 POWDER, FOR SOLUTION ORAL at 17:54

## 2019-03-12 RX ADMIN — Medication 4 MG: at 02:26

## 2019-03-12 RX ADMIN — Medication 4 MG: at 06:54

## 2019-03-12 ASSESSMENT — PAIN SCALES - GENERAL
PAINLEVEL_OUTOF10: 8
PAINLEVEL_OUTOF10: 9
PAINLEVEL_OUTOF10: 10
PAINLEVEL_OUTOF10: 10
PAINLEVEL_OUTOF10: 5
PAINLEVEL_OUTOF10: 8
PAINLEVEL_OUTOF10: 10
PAINLEVEL_OUTOF10: 10
PAINLEVEL_OUTOF10: 9
PAINLEVEL_OUTOF10: 10
PAINLEVEL_OUTOF10: 0
PAINLEVEL_OUTOF10: 9
PAINLEVEL_OUTOF10: 10
PAINLEVEL_OUTOF10: 10

## 2019-03-12 ASSESSMENT — PAIN DESCRIPTION - DESCRIPTORS
DESCRIPTORS: ACHING;DISCOMFORT;CRAMPING
DESCRIPTORS: ACHING;CONSTANT;CRAMPING
DESCRIPTORS: ACHING;CRAMPING;DISCOMFORT
DESCRIPTORS: ACHING;DISCOMFORT;SORE
DESCRIPTORS: ACHING;DISCOMFORT;DULL
DESCRIPTORS: ACHING;DISCOMFORT;CRAMPING
DESCRIPTORS: ACHING;DISCOMFORT;DULL
DESCRIPTORS: ACHING;CONSTANT;DISCOMFORT
DESCRIPTORS: ACHING;DISCOMFORT;CRAMPING

## 2019-03-12 ASSESSMENT — PAIN DESCRIPTION - ONSET
ONSET: ON-GOING

## 2019-03-12 ASSESSMENT — PAIN DESCRIPTION - PROGRESSION
CLINICAL_PROGRESSION: NOT CHANGED

## 2019-03-12 ASSESSMENT — PAIN DESCRIPTION - FREQUENCY
FREQUENCY: CONTINUOUS

## 2019-03-12 ASSESSMENT — PAIN DESCRIPTION - LOCATION
LOCATION: ABDOMEN
LOCATION: BACK;HIP;LEG
LOCATION: HIP
LOCATION: BACK;HIP;LEG
LOCATION: HIP;BACK;HEAD
LOCATION: HIP;BACK;HEAD
LOCATION: BACK;HIP;LEG
LOCATION: HIP;BACK;HEAD
LOCATION: HIP;HEAD;BACK

## 2019-03-12 ASSESSMENT — PAIN DESCRIPTION - PAIN TYPE
TYPE: ACUTE PAIN
TYPE: SURGICAL PAIN;CHRONIC PAIN
TYPE: SURGICAL PAIN;CHRONIC PAIN
TYPE: SURGICAL PAIN
TYPE: SURGICAL PAIN;CHRONIC PAIN
TYPE: SURGICAL PAIN
TYPE: SURGICAL PAIN;CHRONIC PAIN
TYPE: CHRONIC PAIN
TYPE: SURGICAL PAIN

## 2019-03-12 ASSESSMENT — PAIN DESCRIPTION - ORIENTATION
ORIENTATION: RIGHT;LEFT
ORIENTATION: RIGHT;ANTERIOR
ORIENTATION: RIGHT;LEFT
ORIENTATION: MID;UPPER
ORIENTATION: RIGHT
ORIENTATION: RIGHT;LEFT

## 2019-03-12 ASSESSMENT — PAIN SCALES - WONG BAKER: WONGBAKER_NUMERICALRESPONSE: 0

## 2019-03-13 ENCOUNTER — ANESTHESIA EVENT (OUTPATIENT)
Dept: ENDOSCOPY | Age: 59
DRG: 480 | End: 2019-03-13
Payer: OTHER GOVERNMENT

## 2019-03-13 ENCOUNTER — ANESTHESIA (OUTPATIENT)
Dept: ENDOSCOPY | Age: 59
DRG: 480 | End: 2019-03-13
Payer: OTHER GOVERNMENT

## 2019-03-13 VITALS
TEMPERATURE: 98.7 F | OXYGEN SATURATION: 96 % | HEART RATE: 68 BPM | BODY MASS INDEX: 17.72 KG/M2 | HEIGHT: 63 IN | RESPIRATION RATE: 18 BRPM | DIASTOLIC BLOOD PRESSURE: 62 MMHG | SYSTOLIC BLOOD PRESSURE: 136 MMHG | WEIGHT: 100 LBS

## 2019-03-13 VITALS
OXYGEN SATURATION: 100 % | SYSTOLIC BLOOD PRESSURE: 86 MMHG | DIASTOLIC BLOOD PRESSURE: 42 MMHG | RESPIRATION RATE: 10 BRPM

## 2019-03-13 LAB
CK MB: 2.3 NG/ML (ref 0–4.3)
D DIMER: 922 NG/ML DDU
HCT VFR BLD CALC: 26.5 % (ref 34–48)
HCT VFR BLD CALC: 27 % (ref 34–48)
HEMOGLOBIN: 8 G/DL (ref 11.5–15.5)
HEMOGLOBIN: 8.1 G/DL (ref 11.5–15.5)
MAGNESIUM: 1.8 MG/DL (ref 1.6–2.6)
TOTAL CK: 257 U/L (ref 20–180)
TROPONIN: <0.01 NG/ML (ref 0–0.03)
TROPONIN: <0.01 NG/ML (ref 0–0.03)

## 2019-03-13 PROCEDURE — 85014 HEMATOCRIT: CPT

## 2019-03-13 PROCEDURE — 7100000001 HC PACU RECOVERY - ADDTL 15 MIN: Performed by: SURGERY

## 2019-03-13 PROCEDURE — 6370000000 HC RX 637 (ALT 250 FOR IP): Performed by: SURGERY

## 2019-03-13 PROCEDURE — 6360000002 HC RX W HCPCS: Performed by: SURGERY

## 2019-03-13 PROCEDURE — 88305 TISSUE EXAM BY PATHOLOGIST: CPT

## 2019-03-13 PROCEDURE — 2700000000 HC OXYGEN THERAPY PER DAY

## 2019-03-13 PROCEDURE — 6360000002 HC RX W HCPCS: Performed by: NURSE ANESTHETIST, CERTIFIED REGISTERED

## 2019-03-13 PROCEDURE — 94640 AIRWAY INHALATION TREATMENT: CPT

## 2019-03-13 PROCEDURE — 85018 HEMOGLOBIN: CPT

## 2019-03-13 PROCEDURE — 6360000002 HC RX W HCPCS: Performed by: STUDENT IN AN ORGANIZED HEALTH CARE EDUCATION/TRAINING PROGRAM

## 2019-03-13 PROCEDURE — 82550 ASSAY OF CK (CPK): CPT

## 2019-03-13 PROCEDURE — 36415 COLL VENOUS BLD VENIPUNCTURE: CPT

## 2019-03-13 PROCEDURE — 85378 FIBRIN DEGRADE SEMIQUANT: CPT

## 2019-03-13 PROCEDURE — 2580000003 HC RX 258: Performed by: STUDENT IN AN ORGANIZED HEALTH CARE EDUCATION/TRAINING PROGRAM

## 2019-03-13 PROCEDURE — 3609010300 HC COLONOSCOPY W/BIOPSY SINGLE/MULTIPLE: Performed by: SURGERY

## 2019-03-13 PROCEDURE — 83735 ASSAY OF MAGNESIUM: CPT

## 2019-03-13 PROCEDURE — 82553 CREATINE MB FRACTION: CPT

## 2019-03-13 PROCEDURE — 3700000001 HC ADD 15 MINUTES (ANESTHESIA): Performed by: SURGERY

## 2019-03-13 PROCEDURE — 2709999900 HC NON-CHARGEABLE SUPPLY: Performed by: SURGERY

## 2019-03-13 PROCEDURE — 84484 ASSAY OF TROPONIN QUANT: CPT

## 2019-03-13 PROCEDURE — 2580000003 HC RX 258: Performed by: NURSE ANESTHETIST, CERTIFIED REGISTERED

## 2019-03-13 PROCEDURE — 3700000000 HC ANESTHESIA ATTENDED CARE: Performed by: SURGERY

## 2019-03-13 PROCEDURE — 0DBP8ZX EXCISION OF RECTUM, VIA NATURAL OR ARTIFICIAL OPENING ENDOSCOPIC, DIAGNOSTIC: ICD-10-PCS | Performed by: SURGERY

## 2019-03-13 PROCEDURE — 7100000000 HC PACU RECOVERY - FIRST 15 MIN: Performed by: SURGERY

## 2019-03-13 PROCEDURE — 6370000000 HC RX 637 (ALT 250 FOR IP): Performed by: STUDENT IN AN ORGANIZED HEALTH CARE EDUCATION/TRAINING PROGRAM

## 2019-03-13 RX ORDER — PROPOFOL 10 MG/ML
INJECTION, EMULSION INTRAVENOUS PRN
Status: DISCONTINUED | OUTPATIENT
Start: 2019-03-13 | End: 2019-03-13 | Stop reason: SDUPTHER

## 2019-03-13 RX ORDER — SODIUM CHLORIDE 9 MG/ML
INJECTION, SOLUTION INTRAVENOUS CONTINUOUS PRN
Status: DISCONTINUED | OUTPATIENT
Start: 2019-03-13 | End: 2019-03-13 | Stop reason: SDUPTHER

## 2019-03-13 RX ORDER — FENTANYL CITRATE 50 UG/ML
INJECTION, SOLUTION INTRAMUSCULAR; INTRAVENOUS PRN
Status: DISCONTINUED | OUTPATIENT
Start: 2019-03-13 | End: 2019-03-13 | Stop reason: SDUPTHER

## 2019-03-13 RX ORDER — HEPARIN SODIUM (PORCINE) LOCK FLUSH IV SOLN 100 UNIT/ML 100 UNIT/ML
SOLUTION INTRAVENOUS
Status: DISCONTINUED
Start: 2019-03-13 | End: 2019-03-13 | Stop reason: HOSPADM

## 2019-03-13 RX ORDER — HYDROCORTISONE 100 MG/60ML
100 SUSPENSION RECTAL DAILY
Qty: 21 ENEMA | Refills: 0 | Status: SHIPPED | OUTPATIENT
Start: 2019-03-13 | End: 2019-04-03

## 2019-03-13 RX ORDER — HYDROCODONE BITARTRATE AND ACETAMINOPHEN 5; 325 MG/1; MG/1
1 TABLET ORAL EVERY 4 HOURS PRN
Qty: 42 TABLET | Refills: 0 | Status: SHIPPED | OUTPATIENT
Start: 2019-03-13 | End: 2019-03-20

## 2019-03-13 RX ORDER — HYDROCORTISONE 100 MG/60ML
100 SUSPENSION RECTAL DAILY
Status: DISCONTINUED | OUTPATIENT
Start: 2019-03-13 | End: 2019-03-13 | Stop reason: HOSPADM

## 2019-03-13 RX ADMIN — Medication 10 ML: at 07:40

## 2019-03-13 RX ADMIN — HYDROCODONE BITARTRATE AND ACETAMINOPHEN 2 TABLET: 5; 325 TABLET ORAL at 10:25

## 2019-03-13 RX ADMIN — Medication 4 MG: at 07:39

## 2019-03-13 RX ADMIN — GUAIFENESIN 400 MG: 400 TABLET ORAL at 09:04

## 2019-03-13 RX ADMIN — HYDROCODONE BITARTRATE AND ACETAMINOPHEN 2 TABLET: 5; 325 TABLET ORAL at 05:49

## 2019-03-13 RX ADMIN — SODIUM CHLORIDE: 9 INJECTION, SOLUTION INTRAVENOUS at 08:57

## 2019-03-13 RX ADMIN — Medication 4 MG: at 03:19

## 2019-03-13 RX ADMIN — PROMETHAZINE HYDROCHLORIDE 6.25 MG: 25 INJECTION INTRAMUSCULAR; INTRAVENOUS at 11:40

## 2019-03-13 RX ADMIN — HYDROCODONE BITARTRATE AND ACETAMINOPHEN 2 TABLET: 5; 325 TABLET ORAL at 15:32

## 2019-03-13 RX ADMIN — HYDROCODONE BITARTRATE AND ACETAMINOPHEN 2 TABLET: 5; 325 TABLET ORAL at 00:15

## 2019-03-13 RX ADMIN — PROPOFOL 110 MG: 10 INJECTION, EMULSION INTRAVENOUS at 08:59

## 2019-03-13 RX ADMIN — NYSTATIN 500000 UNITS: 100000 SUSPENSION ORAL at 14:18

## 2019-03-13 RX ADMIN — SERTRALINE 50 MG: 50 TABLET, FILM COATED ORAL at 10:25

## 2019-03-13 RX ADMIN — AMLODIPINE BESYLATE 5 MG: 5 TABLET ORAL at 10:25

## 2019-03-13 RX ADMIN — Medication 2 MG: at 11:40

## 2019-03-13 RX ADMIN — IPRATROPIUM BROMIDE AND ALBUTEROL SULFATE 1 AMPULE: .5; 3 SOLUTION RESPIRATORY (INHALATION) at 10:14

## 2019-03-13 RX ADMIN — ONDANSETRON HYDROCHLORIDE 4 MG: 2 SOLUTION INTRAMUSCULAR; INTRAVENOUS at 06:05

## 2019-03-13 RX ADMIN — GUAIFENESIN 400 MG: 400 TABLET ORAL at 14:18

## 2019-03-13 RX ADMIN — FENTANYL CITRATE 50 MCG: 50 INJECTION, SOLUTION INTRAMUSCULAR; INTRAVENOUS at 09:04

## 2019-03-13 RX ADMIN — FENTANYL CITRATE 50 MCG: 50 INJECTION, SOLUTION INTRAMUSCULAR; INTRAVENOUS at 08:59

## 2019-03-13 RX ADMIN — GABAPENTIN 600 MG: 600 TABLET ORAL at 14:18

## 2019-03-13 RX ADMIN — GABAPENTIN 600 MG: 600 TABLET ORAL at 10:24

## 2019-03-13 ASSESSMENT — PAIN SCALES - GENERAL
PAINLEVEL_OUTOF10: 10
PAINLEVEL_OUTOF10: 6
PAINLEVEL_OUTOF10: 10
PAINLEVEL_OUTOF10: 10
PAINLEVEL_OUTOF10: 0
PAINLEVEL_OUTOF10: 10
PAINLEVEL_OUTOF10: 0
PAINLEVEL_OUTOF10: 10
PAINLEVEL_OUTOF10: 9
PAINLEVEL_OUTOF10: 10
PAINLEVEL_OUTOF10: 3

## 2019-03-13 ASSESSMENT — PAIN DESCRIPTION - ONSET
ONSET: ON-GOING

## 2019-03-13 ASSESSMENT — PAIN DESCRIPTION - LOCATION
LOCATION: ABDOMEN;LEG
LOCATION: BACK;ABDOMEN;LEG
LOCATION: BACK

## 2019-03-13 ASSESSMENT — PAIN DESCRIPTION - PAIN TYPE
TYPE: ACUTE PAIN
TYPE: ACUTE PAIN
TYPE: ACUTE PAIN;SURGICAL PAIN
TYPE: ACUTE PAIN

## 2019-03-13 ASSESSMENT — PAIN DESCRIPTION - ORIENTATION
ORIENTATION: RIGHT

## 2019-03-13 ASSESSMENT — PAIN DESCRIPTION - DESCRIPTORS
DESCRIPTORS: ACHING;CONSTANT;DISCOMFORT
DESCRIPTORS: ACHING;DISCOMFORT;SORE

## 2019-03-13 ASSESSMENT — PAIN DESCRIPTION - PROGRESSION
CLINICAL_PROGRESSION: NOT CHANGED

## 2019-03-13 ASSESSMENT — PAIN DESCRIPTION - FREQUENCY
FREQUENCY: CONTINUOUS

## 2019-03-13 ASSESSMENT — COPD QUESTIONNAIRES: CAT_SEVERITY: NO INTERVAL CHANGE

## 2019-03-14 LAB
EKG ATRIAL RATE: 104 BPM
EKG P-R INTERVAL: 126 MS
EKG Q-T INTERVAL: 352 MS
EKG QRS DURATION: 76 MS
EKG QTC CALCULATION (BAZETT): 462 MS
EKG R AXIS: 165 DEGREES
EKG T AXIS: 146 DEGREES
EKG VENTRICULAR RATE: 104 BPM

## 2019-03-14 RX ORDER — SODIUM CHLORIDE 0.9 % (FLUSH) 0.9 %
10 SYRINGE (ML) INJECTION PRN
Status: CANCELLED | OUTPATIENT
Start: 2019-03-14

## 2019-03-15 ENCOUNTER — HOSPITAL ENCOUNTER (OUTPATIENT)
Dept: INTERVENTIONAL RADIOLOGY/VASCULAR | Age: 59
Discharge: HOME | End: 2019-03-17
Payer: OTHER GOVERNMENT

## 2019-03-15 VITALS
HEART RATE: 80 BPM | SYSTOLIC BLOOD PRESSURE: 122 MMHG | DIASTOLIC BLOOD PRESSURE: 68 MMHG | TEMPERATURE: 98.4 F | RESPIRATION RATE: 16 BRPM | OXYGEN SATURATION: 98 %

## 2019-03-15 DIAGNOSIS — R31.1 BENIGN ESSENTIAL MICROSCOPIC HEMATURIA: Primary | ICD-10-CM

## 2019-03-15 PROCEDURE — 50435 EXCHANGE NEPHROSTOMY CATH: CPT | Performed by: RADIOLOGY

## 2019-03-15 PROCEDURE — 7100000011 HC PHASE II RECOVERY - ADDTL 15 MIN

## 2019-03-15 PROCEDURE — 7100000010 HC PHASE II RECOVERY - FIRST 15 MIN

## 2019-03-15 PROCEDURE — 2709999900 IR GUIDED NEPHROSTOMY CATH EXCHANGE

## 2019-03-15 PROCEDURE — 6360000002 HC RX W HCPCS: Performed by: RADIOLOGY

## 2019-03-15 RX ORDER — FENTANYL CITRATE 50 UG/ML
50 INJECTION, SOLUTION INTRAMUSCULAR; INTRAVENOUS ONCE
Status: COMPLETED | OUTPATIENT
Start: 2019-03-15 | End: 2019-03-15

## 2019-03-15 RX ORDER — SODIUM CHLORIDE 0.9 % (FLUSH) 0.9 %
10 SYRINGE (ML) INJECTION PRN
Status: DISCONTINUED | OUTPATIENT
Start: 2019-03-15 | End: 2019-03-18 | Stop reason: HOSPADM

## 2019-03-15 RX ORDER — FENTANYL CITRATE 50 UG/ML
25 INJECTION, SOLUTION INTRAMUSCULAR; INTRAVENOUS ONCE
Status: COMPLETED | OUTPATIENT
Start: 2019-03-15 | End: 2019-03-15

## 2019-03-15 RX ADMIN — FENTANYL CITRATE 50 MCG: 50 INJECTION INTRAMUSCULAR; INTRAVENOUS at 10:56

## 2019-03-15 RX ADMIN — FENTANYL CITRATE 25 MCG: 50 INJECTION INTRAMUSCULAR; INTRAVENOUS at 10:48

## 2019-03-15 ASSESSMENT — PAIN SCALES - GENERAL
PAINLEVEL_OUTOF10: 9
PAINLEVEL_OUTOF10: 10

## 2019-03-18 ENCOUNTER — POST-OP TELEPHONE (OUTPATIENT)
Dept: INTERVENTIONAL RADIOLOGY/VASCULAR | Age: 59
End: 2019-03-18

## 2019-03-20 DIAGNOSIS — S72.001A DISPLACED FRACTURE OF RIGHT FEMORAL NECK (HCC): Primary | ICD-10-CM

## 2019-03-28 ENCOUNTER — HOSPITAL ENCOUNTER (OUTPATIENT)
Dept: INTERVENTIONAL RADIOLOGY/VASCULAR | Age: 59
Discharge: HOME | End: 2019-03-30
Payer: OTHER GOVERNMENT

## 2019-03-28 VITALS
TEMPERATURE: 97.9 F | OXYGEN SATURATION: 98 % | HEART RATE: 67 BPM | SYSTOLIC BLOOD PRESSURE: 139 MMHG | DIASTOLIC BLOOD PRESSURE: 66 MMHG | RESPIRATION RATE: 16 BRPM

## 2019-03-28 DIAGNOSIS — R10.30 LOWER ABDOMINAL PAIN: ICD-10-CM

## 2019-03-28 DIAGNOSIS — C53.1 MALIGNANT NEOPLASM OF EXOCERVIX (HCC): Primary | ICD-10-CM

## 2019-03-28 PROCEDURE — 6360000004 HC RX CONTRAST MEDICATION: Performed by: RADIOLOGY

## 2019-03-28 PROCEDURE — 50431 NJX PX NFROSGRM &/URTRGRM: CPT | Performed by: RADIOLOGY

## 2019-03-28 PROCEDURE — 2709999900 IR GUIDED NEPHROSTOGRAM/URETEROGRAM EXISTING ACCESS

## 2019-03-28 RX ADMIN — IOVERSOL 3 ML: 678 INJECTION INTRA-ARTERIAL; INTRAVENOUS at 12:24

## 2019-04-19 ENCOUNTER — OFFICE VISIT (OUTPATIENT)
Dept: ORTHOPEDIC SURGERY | Age: 59
End: 2019-04-19
Payer: OTHER GOVERNMENT

## 2019-04-19 ENCOUNTER — HOSPITAL ENCOUNTER (OUTPATIENT)
Dept: GENERAL RADIOLOGY | Age: 59
Discharge: HOME | End: 2019-04-21
Payer: OTHER GOVERNMENT

## 2019-04-19 VITALS
TEMPERATURE: 97.8 F | HEART RATE: 77 BPM | DIASTOLIC BLOOD PRESSURE: 63 MMHG | HEIGHT: 63 IN | SYSTOLIC BLOOD PRESSURE: 126 MMHG | WEIGHT: 96 LBS | BODY MASS INDEX: 17.01 KG/M2

## 2019-04-19 DIAGNOSIS — S72.001A DISPLACED FRACTURE OF RIGHT FEMORAL NECK (HCC): ICD-10-CM

## 2019-04-19 DIAGNOSIS — S72.001A DISPLACED FRACTURE OF RIGHT FEMORAL NECK (HCC): Primary | ICD-10-CM

## 2019-04-19 PROCEDURE — 73502 X-RAY EXAM HIP UNI 2-3 VIEWS: CPT

## 2019-04-19 PROCEDURE — 99024 POSTOP FOLLOW-UP VISIT: CPT | Performed by: PHYSICIAN ASSISTANT

## 2019-04-19 PROCEDURE — 99212 OFFICE O/P EST SF 10 MIN: CPT

## 2019-04-19 RX ORDER — HYDROCODONE BITARTRATE AND ACETAMINOPHEN 5; 325 MG/1; MG/1
1 TABLET ORAL EVERY 6 HOURS PRN
COMMUNITY

## 2019-04-19 NOTE — PROGRESS NOTES
with hip ROM. Demonstrates active knee flexion/extension, ankle plantar/dorsiflexion/great toe extension. Sensation intact to light touch in sural/deep peroneal/superficial peroneal/saphenous/posterior tibial nerve distributions to foot/ankle. Palpable dorsalis pedis and posterior tibialis pulses, cap refill brisk in toes, foot warm/perfused. Compartments supple throughout thigh and leg. Calves soft non tender    /63 (Site: Left Upper Arm, Position: Sitting)   Pulse 77   Temp 97.8 °F (36.6 °C) (Oral)   Ht 5' 3\" (1.6 m)   Wt 96 lb (43.5 kg)   BMI 17.01 kg/m²     XR:  Xrays of the right hip and pelvis demonstrate a femoral neck fracture in acceptable alignment, cannulated screw have backed out from post-op xrays slightly and compression of the fracture is noted. Interval healing is noted. Assessment:   Diagnosis Orders   1. Displaced fracture of right femoral neck [facility])  [address]:  Discussed xray findings with patient will continue observe the patient closely, will recheck back in 4 weeks. Start partial WB with walker instead of WBAT.    Removed staples today  WB:  Partial weight bearing ambulate with walker  Home therapy ordered  DVT: discontinue lovenox, continue ankle pumps, and ambulation for dvt prophalaxis  Dressing: Can remove dressing in 1-2 days then open to air  Can shower in a couple days, NO Soaking or swimming  Follow up in 4 weeks with XR of the hip  Call if issues or concerns, trouble weight bearing or increased hip pain  Keep all follow up appointments  Electronically signed by Olvin Dyer PA-C on 4/19/2019 at 1:40 PM

## 2019-04-19 NOTE — PATIENT INSTRUCTIONS
Removed staples today  WB:  Partial weight bearing ambulate with walker  Home therapy ordered  DVT: discontinue lovenox, continue ankle pumps, and ambulation for dvt prophalaxis  Dressing: Can remove dressing in 1-2 days then open to air  Can shower in a couple days, NO Soaking or swimming  Follow up in 4 weeks with XR of the hip  Call if issues or concerns, trouble weight bearing or increased hip pain  Keep all follow up appointments

## 2019-04-22 ENCOUNTER — TELEPHONE (OUTPATIENT)
Dept: ORTHOPEDIC SURGERY | Age: 59
End: 2019-04-22

## 2019-04-29 ENCOUNTER — APPOINTMENT (OUTPATIENT)
Dept: CT IMAGING | Age: 59
DRG: 689 | End: 2019-04-29
Payer: OTHER GOVERNMENT

## 2019-04-29 ENCOUNTER — HOSPITAL ENCOUNTER (INPATIENT)
Age: 59
LOS: 7 days | Discharge: HOME HEALTH | DRG: 689 | End: 2019-05-06
Attending: EMERGENCY MEDICINE | Admitting: HOSPITALIST
Payer: OTHER GOVERNMENT

## 2019-04-29 DIAGNOSIS — N39.0 URINARY TRACT INFECTION ASSOCIATED WITH NEPHROSTOMY CATHETER, INITIAL ENCOUNTER (HCC): Primary | ICD-10-CM

## 2019-04-29 DIAGNOSIS — T83.512A URINARY TRACT INFECTION ASSOCIATED WITH NEPHROSTOMY CATHETER, INITIAL ENCOUNTER (HCC): Primary | ICD-10-CM

## 2019-04-29 LAB
ALBUMIN SERPL-MCNC: 3.3 G/DL (ref 3.5–5.2)
ALP BLD-CCNC: 65 U/L (ref 35–104)
ALT SERPL-CCNC: <5 U/L (ref 0–32)
ANION GAP SERPL CALCULATED.3IONS-SCNC: 12 MMOL/L (ref 7–16)
ANISOCYTOSIS: ABNORMAL
AST SERPL-CCNC: 8 U/L (ref 0–31)
BACTERIA: ABNORMAL /HPF
BASOPHILS ABSOLUTE: 0.02 E9/L (ref 0–0.2)
BASOPHILS RELATIVE PERCENT: 0.4 % (ref 0–2)
BILIRUB SERPL-MCNC: <0.2 MG/DL (ref 0–1.2)
BILIRUBIN URINE: NEGATIVE
BLOOD, URINE: ABNORMAL
BUN BLDV-MCNC: 18 MG/DL (ref 8–23)
BURR CELLS: ABNORMAL
CALCIUM SERPL-MCNC: 8.3 MG/DL (ref 8.6–10.2)
CHLORIDE BLD-SCNC: 107 MMOL/L (ref 98–107)
CLARITY: CLEAR
CO2: 21 MMOL/L (ref 22–29)
COLOR: YELLOW
CREAT SERPL-MCNC: 1.3 MG/DL (ref 0.5–1)
CRYSTALS, UA: ABNORMAL
EOSINOPHILS ABSOLUTE: 0.12 E9/L (ref 0.05–0.5)
EOSINOPHILS RELATIVE PERCENT: 2.2 % (ref 0–6)
GFR AFRICAN AMERICAN: 50
GFR NON-AFRICAN AMERICAN: 42 ML/MIN/1.73
GLUCOSE BLD-MCNC: 131 MG/DL (ref 74–99)
GLUCOSE URINE: NEGATIVE MG/DL
HCT VFR BLD CALC: 34.1 % (ref 34–48)
HEMOGLOBIN: 10.7 G/DL (ref 11.5–15.5)
HYPOCHROMIA: ABNORMAL
IMMATURE GRANULOCYTES #: 0.02 E9/L
IMMATURE GRANULOCYTES %: 0.4 % (ref 0–5)
KETONES, URINE: NEGATIVE MG/DL
LACTIC ACID: 3 MMOL/L (ref 0.5–2.2)
LEUKOCYTE ESTERASE, URINE: ABNORMAL
LIPASE: 9 U/L (ref 13–60)
LYMPHOCYTES ABSOLUTE: 0.48 E9/L (ref 1.5–4)
LYMPHOCYTES RELATIVE PERCENT: 8.6 % (ref 20–42)
MCH RBC QN AUTO: 30.1 PG (ref 26–35)
MCHC RBC AUTO-ENTMCNC: 31.4 % (ref 32–34.5)
MCV RBC AUTO: 95.8 FL (ref 80–99.9)
MONOCYTES ABSOLUTE: 0.5 E9/L (ref 0.1–0.95)
MONOCYTES RELATIVE PERCENT: 9 % (ref 2–12)
NEUTROPHILS ABSOLUTE: 4.44 E9/L (ref 1.8–7.3)
NEUTROPHILS RELATIVE PERCENT: 79.4 % (ref 43–80)
NITRITE, URINE: POSITIVE
OVALOCYTES: ABNORMAL
PDW BLD-RTO: 16.1 FL (ref 11.5–15)
PH UA: 8.5 (ref 5–9)
PLATELET # BLD: 317 E9/L (ref 130–450)
PMV BLD AUTO: 9.2 FL (ref 7–12)
POIKILOCYTES: ABNORMAL
POLYCHROMASIA: ABNORMAL
POTASSIUM SERPL-SCNC: 3.9 MMOL/L (ref 3.5–5)
PROTEIN UA: 100 MG/DL
RBC # BLD: 3.56 E12/L (ref 3.5–5.5)
RBC UA: ABNORMAL /HPF (ref 0–2)
SODIUM BLD-SCNC: 140 MMOL/L (ref 132–146)
SPECIFIC GRAVITY UA: 1.01 (ref 1–1.03)
TOTAL PROTEIN: 6.4 G/DL (ref 6.4–8.3)
UROBILINOGEN, URINE: 0.2 E.U./DL
WBC # BLD: 5.6 E9/L (ref 4.5–11.5)
WBC UA: ABNORMAL /HPF (ref 0–5)

## 2019-04-29 PROCEDURE — 2580000003 HC RX 258: Performed by: EMERGENCY MEDICINE

## 2019-04-29 PROCEDURE — 96365 THER/PROPH/DIAG IV INF INIT: CPT

## 2019-04-29 PROCEDURE — 96367 TX/PROPH/DG ADDL SEQ IV INF: CPT

## 2019-04-29 PROCEDURE — 96375 TX/PRO/DX INJ NEW DRUG ADDON: CPT

## 2019-04-29 PROCEDURE — 83690 ASSAY OF LIPASE: CPT

## 2019-04-29 PROCEDURE — 85025 COMPLETE CBC W/AUTO DIFF WBC: CPT

## 2019-04-29 PROCEDURE — 99285 EMERGENCY DEPT VISIT HI MDM: CPT

## 2019-04-29 PROCEDURE — 83605 ASSAY OF LACTIC ACID: CPT

## 2019-04-29 PROCEDURE — 74176 CT ABD & PELVIS W/O CONTRAST: CPT

## 2019-04-29 PROCEDURE — 87088 URINE BACTERIA CULTURE: CPT

## 2019-04-29 PROCEDURE — 6360000002 HC RX W HCPCS: Performed by: EMERGENCY MEDICINE

## 2019-04-29 PROCEDURE — 36415 COLL VENOUS BLD VENIPUNCTURE: CPT

## 2019-04-29 PROCEDURE — 96376 TX/PRO/DX INJ SAME DRUG ADON: CPT

## 2019-04-29 PROCEDURE — 80053 COMPREHEN METABOLIC PANEL: CPT

## 2019-04-29 PROCEDURE — 2140000000 HC CCU INTERMEDIATE R&B

## 2019-04-29 PROCEDURE — 81001 URINALYSIS AUTO W/SCOPE: CPT

## 2019-04-29 RX ORDER — GABAPENTIN 600 MG/1
600 TABLET ORAL 3 TIMES DAILY
Status: DISCONTINUED | OUTPATIENT
Start: 2019-04-30 | End: 2019-05-02 | Stop reason: SDUPTHER

## 2019-04-29 RX ORDER — DOCUSATE SODIUM 100 MG/1
100 CAPSULE, LIQUID FILLED ORAL 2 TIMES DAILY
Status: DISCONTINUED | OUTPATIENT
Start: 2019-04-30 | End: 2019-05-04

## 2019-04-29 RX ORDER — SODIUM CHLORIDE 0.9 % (FLUSH) 0.9 %
10 SYRINGE (ML) INJECTION EVERY 12 HOURS SCHEDULED
Status: DISCONTINUED | OUTPATIENT
Start: 2019-04-30 | End: 2019-05-06 | Stop reason: HOSPADM

## 2019-04-29 RX ORDER — HYDROCODONE BITARTRATE AND ACETAMINOPHEN 5; 325 MG/1; MG/1
1 TABLET ORAL EVERY 6 HOURS PRN
Status: DISCONTINUED | OUTPATIENT
Start: 2019-04-29 | End: 2019-05-01

## 2019-04-29 RX ORDER — METHADONE HYDROCHLORIDE 10 MG/1
10 TABLET ORAL EVERY 8 HOURS PRN
Status: DISCONTINUED | OUTPATIENT
Start: 2019-04-29 | End: 2019-05-06 | Stop reason: HOSPADM

## 2019-04-29 RX ORDER — ACETAMINOPHEN 325 MG/1
650 TABLET ORAL EVERY 6 HOURS PRN
Status: DISCONTINUED | OUTPATIENT
Start: 2019-04-29 | End: 2019-05-03

## 2019-04-29 RX ORDER — 0.9 % SODIUM CHLORIDE 0.9 %
30 INTRAVENOUS SOLUTION INTRAVENOUS ONCE
Status: DISCONTINUED | OUTPATIENT
Start: 2019-04-29 | End: 2019-04-29

## 2019-04-29 RX ORDER — ATROPA BELLADONNA AND OPIUM 16.2; 6 MG/1; MG/1
60 SUPPOSITORY RECTAL 2 TIMES DAILY PRN
Status: DISCONTINUED | OUTPATIENT
Start: 2019-04-29 | End: 2019-05-06 | Stop reason: HOSPADM

## 2019-04-29 RX ORDER — FENTANYL 100 UG/H
1 PATCH TRANSDERMAL
Status: DISCONTINUED | OUTPATIENT
Start: 2019-04-30 | End: 2019-05-06 | Stop reason: HOSPADM

## 2019-04-29 RX ORDER — OXYBUTYNIN CHLORIDE 5 MG/1
5 TABLET ORAL DAILY
Status: DISCONTINUED | OUTPATIENT
Start: 2019-04-30 | End: 2019-05-06 | Stop reason: HOSPADM

## 2019-04-29 RX ORDER — FENTANYL CITRATE 50 UG/ML
50 INJECTION, SOLUTION INTRAMUSCULAR; INTRAVENOUS ONCE
Status: COMPLETED | OUTPATIENT
Start: 2019-04-29 | End: 2019-04-29

## 2019-04-29 RX ORDER — AMLODIPINE BESYLATE 5 MG/1
5 TABLET ORAL DAILY
Status: DISCONTINUED | OUTPATIENT
Start: 2019-04-30 | End: 2019-05-06 | Stop reason: HOSPADM

## 2019-04-29 RX ORDER — 0.9 % SODIUM CHLORIDE 0.9 %
1000 INTRAVENOUS SOLUTION INTRAVENOUS ONCE
Status: COMPLETED | OUTPATIENT
Start: 2019-04-29 | End: 2019-04-30

## 2019-04-29 RX ORDER — ONDANSETRON 2 MG/ML
4 INJECTION INTRAMUSCULAR; INTRAVENOUS ONCE
Status: COMPLETED | OUTPATIENT
Start: 2019-04-29 | End: 2019-04-29

## 2019-04-29 RX ORDER — SODIUM CHLORIDE 0.9 % (FLUSH) 0.9 %
10 SYRINGE (ML) INJECTION PRN
Status: DISCONTINUED | OUTPATIENT
Start: 2019-04-29 | End: 2019-05-06 | Stop reason: HOSPADM

## 2019-04-29 RX ORDER — METOCLOPRAMIDE 5 MG/1
5 TABLET ORAL 4 TIMES DAILY
COMMUNITY

## 2019-04-29 RX ORDER — ONDANSETRON 2 MG/ML
4 INJECTION INTRAMUSCULAR; INTRAVENOUS EVERY 6 HOURS PRN
Status: DISCONTINUED | OUTPATIENT
Start: 2019-04-29 | End: 2019-05-06 | Stop reason: HOSPADM

## 2019-04-29 RX ORDER — 0.9 % SODIUM CHLORIDE 0.9 %
500 INTRAVENOUS SOLUTION INTRAVENOUS ONCE
Status: COMPLETED | OUTPATIENT
Start: 2019-04-29 | End: 2019-04-29

## 2019-04-29 RX ORDER — MORPHINE SULFATE 4 MG/ML
5 INJECTION, SOLUTION INTRAMUSCULAR; INTRAVENOUS ONCE
Status: COMPLETED | OUTPATIENT
Start: 2019-04-29 | End: 2019-04-29

## 2019-04-29 RX ORDER — METOCLOPRAMIDE 5 MG/1
5 TABLET ORAL
Status: DISCONTINUED | OUTPATIENT
Start: 2019-04-30 | End: 2019-05-04

## 2019-04-29 RX ADMIN — FENTANYL CITRATE 50 MCG: 50 INJECTION, SOLUTION INTRAMUSCULAR; INTRAVENOUS at 18:36

## 2019-04-29 RX ADMIN — FENTANYL CITRATE 50 MCG: 50 INJECTION, SOLUTION INTRAMUSCULAR; INTRAVENOUS at 20:43

## 2019-04-29 RX ADMIN — MORPHINE SULFATE 5 MG: 4 INJECTION INTRAVENOUS at 22:50

## 2019-04-29 RX ADMIN — VANCOMYCIN HYDROCHLORIDE 1000 MG: 1 INJECTION, POWDER, LYOPHILIZED, FOR SOLUTION INTRAVENOUS at 22:54

## 2019-04-29 RX ADMIN — SODIUM CHLORIDE 1000 ML: 9 INJECTION, SOLUTION INTRAVENOUS at 22:19

## 2019-04-29 RX ADMIN — SODIUM CHLORIDE 500 ML: 9 INJECTION, SOLUTION INTRAVENOUS at 18:44

## 2019-04-29 RX ADMIN — CEFTRIAXONE SODIUM 1 G: 1 INJECTION, POWDER, FOR SOLUTION INTRAMUSCULAR; INTRAVENOUS at 22:19

## 2019-04-29 RX ADMIN — ONDANSETRON 4 MG: 2 INJECTION INTRAMUSCULAR; INTRAVENOUS at 18:36

## 2019-04-29 ASSESSMENT — PAIN DESCRIPTION - LOCATION: LOCATION: FLANK;BACK

## 2019-04-29 ASSESSMENT — PAIN SCALES - GENERAL
PAINLEVEL_OUTOF10: 10
PAINLEVEL_OUTOF10: 10

## 2019-04-29 ASSESSMENT — PAIN DESCRIPTION - PAIN TYPE: TYPE: ACUTE PAIN

## 2019-04-29 ASSESSMENT — PAIN DESCRIPTION - ORIENTATION: ORIENTATION: RIGHT

## 2019-04-30 LAB
ANION GAP SERPL CALCULATED.3IONS-SCNC: 14 MMOL/L (ref 7–16)
BASOPHILS ABSOLUTE: 0.04 E9/L (ref 0–0.2)
BASOPHILS RELATIVE PERCENT: 0.7 % (ref 0–2)
BUN BLDV-MCNC: 16 MG/DL (ref 8–23)
CALCIUM SERPL-MCNC: 8.2 MG/DL (ref 8.6–10.2)
CHLORIDE BLD-SCNC: 106 MMOL/L (ref 98–107)
CO2: 20 MMOL/L (ref 22–29)
CREAT SERPL-MCNC: 1.4 MG/DL (ref 0.5–1)
EOSINOPHILS ABSOLUTE: 0.22 E9/L (ref 0.05–0.5)
EOSINOPHILS RELATIVE PERCENT: 3.9 % (ref 0–6)
GFR AFRICAN AMERICAN: 46
GFR NON-AFRICAN AMERICAN: 38 ML/MIN/1.73
GLUCOSE BLD-MCNC: 94 MG/DL (ref 74–99)
HCT VFR BLD CALC: 36.1 % (ref 34–48)
HEMOGLOBIN: 11.4 G/DL (ref 11.5–15.5)
IMMATURE GRANULOCYTES #: 0.03 E9/L
IMMATURE GRANULOCYTES %: 0.5 % (ref 0–5)
INR BLD: 1.1
LACTIC ACID: 2.1 MMOL/L (ref 0.5–2.2)
LYMPHOCYTES ABSOLUTE: 0.68 E9/L (ref 1.5–4)
LYMPHOCYTES RELATIVE PERCENT: 12.1 % (ref 20–42)
MAGNESIUM: 2.1 MG/DL (ref 1.6–2.6)
MCH RBC QN AUTO: 31.3 PG (ref 26–35)
MCHC RBC AUTO-ENTMCNC: 31.6 % (ref 32–34.5)
MCV RBC AUTO: 99.2 FL (ref 80–99.9)
MONOCYTES ABSOLUTE: 0.7 E9/L (ref 0.1–0.95)
MONOCYTES RELATIVE PERCENT: 12.4 % (ref 2–12)
NEUTROPHILS ABSOLUTE: 3.97 E9/L (ref 1.8–7.3)
NEUTROPHILS RELATIVE PERCENT: 70.4 % (ref 43–80)
PDW BLD-RTO: 16.1 FL (ref 11.5–15)
PLATELET # BLD: 304 E9/L (ref 130–450)
PMV BLD AUTO: 8.6 FL (ref 7–12)
POTASSIUM REFLEX MAGNESIUM: 3.8 MMOL/L (ref 3.5–5)
PROTHROMBIN TIME: 12.2 SEC (ref 9.3–12.4)
RBC # BLD: 3.64 E12/L (ref 3.5–5.5)
SODIUM BLD-SCNC: 140 MMOL/L (ref 132–146)
WBC # BLD: 5.6 E9/L (ref 4.5–11.5)

## 2019-04-30 PROCEDURE — 99406 BEHAV CHNG SMOKING 3-10 MIN: CPT

## 2019-04-30 PROCEDURE — 97161 PT EVAL LOW COMPLEX 20 MIN: CPT

## 2019-04-30 PROCEDURE — 85025 COMPLETE CBC W/AUTO DIFF WBC: CPT

## 2019-04-30 PROCEDURE — 6370000000 HC RX 637 (ALT 250 FOR IP): Performed by: HOSPITALIST

## 2019-04-30 PROCEDURE — 2580000003 HC RX 258: Performed by: HOSPITALIST

## 2019-04-30 PROCEDURE — 83735 ASSAY OF MAGNESIUM: CPT

## 2019-04-30 PROCEDURE — 36415 COLL VENOUS BLD VENIPUNCTURE: CPT

## 2019-04-30 PROCEDURE — 97530 THERAPEUTIC ACTIVITIES: CPT

## 2019-04-30 PROCEDURE — 83605 ASSAY OF LACTIC ACID: CPT

## 2019-04-30 PROCEDURE — 80048 BASIC METABOLIC PNL TOTAL CA: CPT

## 2019-04-30 PROCEDURE — 2140000000 HC CCU INTERMEDIATE R&B

## 2019-04-30 PROCEDURE — 6360000002 HC RX W HCPCS: Performed by: INTERNAL MEDICINE

## 2019-04-30 PROCEDURE — 85610 PROTHROMBIN TIME: CPT

## 2019-04-30 PROCEDURE — 2580000003 HC RX 258: Performed by: INTERNAL MEDICINE

## 2019-04-30 PROCEDURE — 87040 BLOOD CULTURE FOR BACTERIA: CPT

## 2019-04-30 RX ADMIN — METOCLOPRAMIDE 5 MG: 5 TABLET ORAL at 17:55

## 2019-04-30 RX ADMIN — METHADONE HYDROCHLORIDE 10 MG: 10 TABLET ORAL at 04:32

## 2019-04-30 RX ADMIN — METHADONE HYDROCHLORIDE 10 MG: 10 TABLET ORAL at 20:32

## 2019-04-30 RX ADMIN — METHADONE HYDROCHLORIDE 10 MG: 10 TABLET ORAL at 12:32

## 2019-04-30 RX ADMIN — OXYBUTYNIN CHLORIDE 5 MG: 5 TABLET ORAL at 10:29

## 2019-04-30 RX ADMIN — Medication 10 ML: at 10:30

## 2019-04-30 RX ADMIN — METOCLOPRAMIDE 5 MG: 5 TABLET ORAL at 20:28

## 2019-04-30 RX ADMIN — DOCUSATE SODIUM 100 MG: 100 CAPSULE, LIQUID FILLED ORAL at 20:29

## 2019-04-30 RX ADMIN — DOCUSATE SODIUM 100 MG: 100 CAPSULE, LIQUID FILLED ORAL at 10:29

## 2019-04-30 RX ADMIN — PIPERACILLIN AND TAZOBACTAM 3.38 G: 3; .375 INJECTION, POWDER, FOR SOLUTION INTRAVENOUS at 10:29

## 2019-04-30 RX ADMIN — HYDROCODONE BITARTRATE AND ACETAMINOPHEN 1 TABLET: 5; 325 TABLET ORAL at 10:29

## 2019-04-30 RX ADMIN — GABAPENTIN 600 MG: 600 TABLET, FILM COATED ORAL at 03:03

## 2019-04-30 RX ADMIN — METOCLOPRAMIDE 5 MG: 5 TABLET ORAL at 06:58

## 2019-04-30 RX ADMIN — METOCLOPRAMIDE 5 MG: 5 TABLET ORAL at 11:00

## 2019-04-30 RX ADMIN — HYDROCODONE BITARTRATE AND ACETAMINOPHEN 1 TABLET: 5; 325 TABLET ORAL at 17:55

## 2019-04-30 RX ADMIN — SERTRALINE 50 MG: 50 TABLET, FILM COATED ORAL at 10:29

## 2019-04-30 RX ADMIN — HYDROCODONE BITARTRATE AND ACETAMINOPHEN 1 TABLET: 5; 325 TABLET ORAL at 01:08

## 2019-04-30 RX ADMIN — GABAPENTIN 600 MG: 600 TABLET, FILM COATED ORAL at 20:28

## 2019-04-30 RX ADMIN — DOCUSATE SODIUM 100 MG: 100 CAPSULE, LIQUID FILLED ORAL at 03:03

## 2019-04-30 RX ADMIN — GABAPENTIN 600 MG: 600 TABLET, FILM COATED ORAL at 10:29

## 2019-04-30 RX ADMIN — AMLODIPINE BESYLATE 5 MG: 5 TABLET ORAL at 10:29

## 2019-04-30 RX ADMIN — GABAPENTIN 600 MG: 600 TABLET, FILM COATED ORAL at 15:13

## 2019-04-30 RX ADMIN — PIPERACILLIN AND TAZOBACTAM 3.38 G: 3; .375 INJECTION, POWDER, FOR SOLUTION INTRAVENOUS at 17:55

## 2019-04-30 ASSESSMENT — PAIN DESCRIPTION - PROGRESSION
CLINICAL_PROGRESSION: NOT CHANGED

## 2019-04-30 ASSESSMENT — PAIN DESCRIPTION - ONSET
ONSET: ON-GOING

## 2019-04-30 ASSESSMENT — PAIN SCALES - GENERAL
PAINLEVEL_OUTOF10: 10
PAINLEVEL_OUTOF10: 2
PAINLEVEL_OUTOF10: 7
PAINLEVEL_OUTOF10: 9
PAINLEVEL_OUTOF10: 10
PAINLEVEL_OUTOF10: 3
PAINLEVEL_OUTOF10: 10

## 2019-04-30 ASSESSMENT — PAIN DESCRIPTION - DESCRIPTORS
DESCRIPTORS: ACHING;DISCOMFORT;DULL
DESCRIPTORS: SORE;DISCOMFORT;CONSTANT
DESCRIPTORS: ACHING;DULL;DISCOMFORT
DESCRIPTORS: SORE;DISCOMFORT;CONSTANT
DESCRIPTORS: SORE;DISCOMFORT;ACHING
DESCRIPTORS: SORE;DISCOMFORT;CONSTANT
DESCRIPTORS: SORE;DISCOMFORT;CONSTANT

## 2019-04-30 ASSESSMENT — PAIN DESCRIPTION - PAIN TYPE
TYPE: ACUTE PAIN
TYPE: CHRONIC PAIN

## 2019-04-30 ASSESSMENT — PAIN DESCRIPTION - FREQUENCY
FREQUENCY: CONTINUOUS

## 2019-04-30 ASSESSMENT — PAIN DESCRIPTION - LOCATION
LOCATION: GENERALIZED

## 2019-04-30 NOTE — PROGRESS NOTES
I changed the patients revolution on her nephrostomy tube while she was here in [place] today.     Thank you  David Fu

## 2019-04-30 NOTE — PROGRESS NOTES
Nutrition Assessment    Type and Reason for Visit: Initial, Positive Nutrition Screen    Nutrition Recommendations: Continue current diet, Start ONS, Ensure TID    Nutrition Assessment: Pt severely malnourished AEB poor PO intake, wt loss greater than 7.5% in 3 months, severe muslce/fat loss d/t dx of overian CA. Now at further compromise d/t nephrostomy infection. Will monitior PO intake and recommend ONS    Malnutrition Assessment:  · Malnutrition Status: Meets the criteria for severe malnutrition  · Context: Chronic illness  · Findings of the 6 clinical characteristics of malnutrition (Minimum of 2 out of 6 clinical characteristics is required to make the diagnosis of moderate or severe Protein Calorie Malnutrition based on AND/ASPEN Guidelines):  1. Energy Intake-Less than or equal to 50% of estimated energy requirement, Greater than or equal to 3 months    2. Weight Loss-10% loss or greater, ([id number])  3. Fat Loss-Severe subcutaneous fat loss, Orbital, Triceps  4. Muscle Loss-Severe muscle mass loss, Calf (gastrocnemius), Thigh (quadriceps), Clavicles (pectoralis and deltoids), Temples (temporalis muscle)  5. Fluid Accumulation-No significant fluid accumulation,    6.  Strength-Not measured    Nutrition Risk Level: High    Nutrient Needs:  · Estimated Daily Total Kcal: 1400-1500kcal(30kcal/kg +250kcal for wt gain)  · Estimated Daily Protein (g): 60-75gm(1.5-1.8gm/kg CBW)  · Estimated Daily Total Fluid (ml/day): 5948-5462(ml/kcal)    Nutrition Diagnosis:   · Problem:  In context of chronic illness, Severe malnutrition  · Etiology: related to Catabolic illness     Signs and symptoms:  as evidenced by Intake 25-50%, Diet history of poor intake, Weight loss, Weight loss greater than or equal to 7.5% in 3 months, Severe muscle loss, Severe loss of subcutaneous fat    Objective Information:  · Nutrition-Focused Physical Findings: A&Ox4, dentures, flat soft abd, poor turgor,+BS, h/o overian CA, RUQ nephrostomy placed, constipation, cachetic, -I/O's  · Wound Type: None  · Current Nutrition Therapies:  · Oral Diet Orders: General   · Oral Diet intake: 26-50%  · Anthropometric Measures:  · Ht: 5' 3\" (160 cm)   · Current Body Wt: 90 lb (40.8 kg)(4/30 Actual per EMR)  · Admission Body Wt: 90 lb (40.8 kg)(4/30 Actual per EMR)  · Usual Body Wt: 110 lb (49.9 kg)(11/2018 standing scale )  · % Weight Change:  ,  20lbs wt loss (~18%) x 4 months  · Ideal Body Wt: 115 lb (52.2 kg), % Ideal Body 78%  · BMI Classification: BMI <18.5 Underweight    Nutrition Interventions:   Continue current diet, Start ONS(Ensure TID)  Continued Inpatient Monitoring, Education Initiated, Coordination of Care    Nutrition Evaluation:   · Evaluation: Goals set   · Goals: Consume >50-75% of meals and ONS    · Monitoring: Meal Intake, Supplement Intake, Diet Tolerance, Skin Integrity, I&O, Weight, Pertinent Labs, Nausea or Vomiting, Monitor Hemodynamic Status, Monitor Bowel Function      Electronically signed by Yola Pena RD, LD on 4/30/19 at 1:14 PM    Contact Number:  Ext [phone]

## 2019-04-30 NOTE — H&P
Hospital Medicine History & Physical      PCP: Forest Segundo MD    Date of Admission: 4/29/2019    Date of Service: 4-30-19    Chief Complaint:  abd pain rt side      History Of Present Illness:      59 y.o. female  hx copd htn hlp cervical cancer on chemo next RX may 15, presenting to the ED for abdominal pain, beginning today. She also complains of problem with her right nephrostomy tube as the dressing is falling off. She reports history of COPD reports her breathing is at her baseline. She has fever chills, no vomiting but has nausea no changes in her pulmo status, on RA, in ER found to have uti, lab normal WBC, hb 10.7, UA grossly abnormal, ctap showed left ureteric cath in proper position, BL adrenal mass increased in size since December 2018, mets to be considered,       Past Medical History:          Diagnosis Date    COPD (chronic obstructive pulmonary disease) ([facility] [address]     Hyperlipidemia     Hypertension     PMB (postmenopausal bleeding)     Squamous cell carcinoma 02/06/2017    Invasive non-keratinizing squamous cell carcinoma with extensive squamous cell/severe dysplasia LAY 3       Past Surgical History:          Procedure Laterality Date    COLONOSCOPY N/A 3/13/2019    COLONOSCOPY WITH BIOPSY performed by Conchita Nino MD at [address] Right 3/4/2019    HIP OPEN REDUCTION INTERNAL FIXATION performed by Jie Sena MD at [address] OTHER SURGICAL HISTORY  06/27/2017    Tandem and Ovoid insertion     OTHER SURGICAL HISTORY N/A 08/04/2017    TONSILLECTOMY      URETER STENT PLACEMENT      kidney stents due to kidney failure       Medications Prior to Admission:      Prior to Admission medications    Medication Sig Start Date End Date Taking?  Authorizing Provider   metoclopramide (REGLAN) 5 MG tablet Take 5 mg by mouth 4 times daily   Yes Historical Provider, MD   HYDROcodone-acetaminophen (NORCO) 5-325 MG per tablet Take 1 tablet by mouth every 6 hours as needed for Pain. Yes Historical Provider, MD   amLODIPine (NORVASC) 5 MG tablet Take 1 tablet by mouth daily 11/14/18  Yes Darío Abernathy MD   opium-belladonna (B&O SUPPRETTES) 16.2-60 MG suppository Place 1 suppository rectally 2 times daily as needed for Pain for up to 7 days. . 11/13/18 4/29/19 Yes Darío Abernathy MD   gabapentin (NEURONTIN) 600 MG tablet Take 1 tablet by mouth 3 times daily. . 10/18/18  Yes Historical Provider, MD   sertraline (ZOLOFT) 50 MG tablet Take 50 mg by mouth daily   Yes Historical Provider, MD   fentaNYL (DURAGESIC) 100 MCG/HR Place 1 patch onto the skin every 72 hours. .   Yes Historical Provider, MD   methadone (DOLOPHINE) 10 MG tablet Take 10 mg by mouth every 8 hours as needed for Pain. .   Yes Historical Provider, MD   oxybutynin (DITROPAN) 5 MG tablet Take 5 mg by mouth daily    Yes Historical Provider, MD   docusate sodium (COLACE) 100 MG capsule Take 100 mg by mouth 2 times daily   Yes Historical Provider, MD   acetaminophen (TYLENOL) 325 MG tablet Take 650 mg by mouth every 6 hours as needed for Pain   Yes Historical Provider, MD       Allergies:  Latex; Other; Asa [aspirin]; and Contrast [barium-containing compounds]    Social History:      The patient currently lives at home    TOBACCO:   reports that she has been smoking cigarettes. She has a 40.00 pack-year smoking history. She has never used smokeless tobacco.  ETOH:   reports that she does not drink alcohol. Family History:      Reviewed in detail and negative for DM, CAD, Cancer, CVA. Positive as follows:    History reviewed. No pertinent family history. REVIEW OF SYSTEMS:   Pertinent positives as noted in the HPI. All other systems reviewed and negative.     PHYSICAL EXAM PERFORMED:    BP (!) 146/65   Pulse 80   Temp 98 °F (36.7 °C) (Oral)   Resp 20   Ht 5' 3\" (1.6 m)   Wt 90 lb 4.8 oz (41 kg)   SpO2 98% BMI 16.00 kg/m²     Constitutional/General: Alert and oriented x3, chronically ill appearing, cachectic, non toxic in NAD  Head: Normocephalic and atraumatic  Eyes: PERRL, EOMI, conjunctiva normal, sclera non icteric  Mouth: Oropharynx clear, handling secretions, no trismus, no asymmetry of the posterior oropharynx or uvular edema  Neck: Supple, full ROM, no stridor, no crepitus, no meningeal signs  Respiratory: Occasional wheezes, No rales, or rhonchi. Not in respiratory distress  Cardiovascular:  Regular rate. Regular rhythm. No murmurs, gallops, or rubs. 2+ distal pulses  GI:  Abdomen Soft, Diffusely tender, Non distended. +BS. No organomegaly, no palpable masses,  No rebound, guarding, or rigidity. Right nephrostomy tube with dressing falling off, site appears clean/dry/intact. Musculoskeletal: Moves all extremities x 4. Warm and well perfused, no clubbing, cyanosis, or edema. Capillary refill <3 seconds  Integument: skin warm and dry. No rashes. Neurologic: GCS 15, no focal deficits, symmetric strength 5/5 in the upper and lower extremities bilaterally  Psychiatric: Normal Affect          Labs:     Recent Labs     04/29/19  1904   WBC 5.6   HGB 10.7*   HCT 34.1        Recent Labs     04/29/19  1904      K 3.9      CO2 21*   BUN 18   CREATININE 1.3*   CALCIUM 8.3*     Recent Labs     04/29/19  1904   AST 8   ALT <5   BILITOT <0.2   ALKPHOS 65     No results for input(s): INR in the last 72 hours. No results for input(s): Jere Laird in the last 72 hours. Urinalysis:      Lab Results   Component Value Date    NITRU POSITIVE 04/29/2019    WBCUA PACKED 04/29/2019    WBCUA >100 08/04/2017    BACTERIA MANY 04/29/2019    RBCUA 5-10 04/29/2019    RBCUA >100 08/04/2017    BLOODU LARGE 04/29/2019    SPECGRAV 1.010 04/29/2019    GLUCOSEU Negative 04/29/2019       Radiology:         CT ABDOMEN PELVIS WO CONTRAST   Final Result      1.  Left ureteric catheters are in proper position, draining the left   kidney. 2. Catheter draining the left kidney in proper position. 3. Some degree of anasarca and discrete ascites. 4. Pattern of constipation. 5. Bilateral adrenal mass which can further increased size since   previous examination of December 2018. Metastasis to be considered. Please correlate clinically. 6. Midline retroperitoneal adenopathy. ASSESSMENT:    Active Hospital Problems    Diagnosis Date Noted    UTI (urinary tract infection) [N39.0] 04/29/2019       59 y.o. female  hx copd htn hlp cervical cancer on chemo next RX may 15, presenting to the ED for abdominal pain, beginning today. She also complains of problem with her right nephrostomy tube as the dressing is falling off. She reports history of COPD reports her breathing is at her baseline. She has fever chills, no vomiting but has nausea no changes in her pulmo status, on RA, in ER found to have uti, lab normal WBC, hb 10.7, UA grossly abnormal, ctap showed left ureteric cath in proper position, BL adrenal mass increased in size since December 2018, mets to be considered,       UTI  RT NT TUBE  CERVICAL CANCER increased mets  Copd  htn'  hlp    Admit  Iv abx, bcx, ucx  Urology/id/onco c/s  Home meds/  NEEDS CLARIFICATION OF METHADONE in AM if prn or ATC        DVT Prophylaxis: LOVENOX  Diet: DIET FULL LIQUID;  Code Status: Full Code    PT/OT Eval Status: ORDERED    Dispo - Kat Tran MD    Thank you Monica Michaels MD for the opportunity to be involved in this patient's care. If you have any questions or concerns please feel free to contact me at [phone].

## 2019-04-30 NOTE — CARE COORDINATION
Spoke with Pt about discharge plans. Pt had [address] in home prior to admission. Pt was interested in Meals on Wheels. SW gave ist of meal providers to Pt. Discharge plan is for Pt to return home with [facility].

## 2019-04-30 NOTE — PROGRESS NOTES
education in this area   x x x       Pts/ family goals   1. Return home    Patient and or family understand(s) diagnosis, prognosis, and plan of care. PLAN  PT care will be provided in accordance with the objectives noted above. Whenever appropriate, clear delegation orders will be provided for nursing staff. Exercises and functional mobility practice will be used as well as appropriate assistive devices or modalities to obtain goals. Patient and family education will also be administered as needed. Frequency of treatments will be 2-5x/week as able.     Time in: 0730  Time out: Rosalba PT, DPT  [id number]

## 2019-04-30 NOTE — PLAN OF CARE
Problem: Inadequate oral food/beverage intake (NI-2.1)  Goal: Food and/or Nutrient Delivery  Description: add ONS TID  Individualized approach for food/nutrient provision.   Outcome: Not Met This Shift

## 2019-04-30 NOTE — PROGRESS NOTES
Consulted Dr. June Atkinson for Oncology.  Answering service stated Dr. Rimma Camarena will be around today

## 2019-04-30 NOTE — PLAN OF CARE
Problem: Falls - Risk of:  Goal: Will remain free from falls  Description  Will remain free from falls  4/30/2019 1809 by Michael Molina RN  Outcome: Met This Shift  4/30/2019 1200 by Michael Molina RN  Outcome: Met This Shift     Problem: Pain:  Description  Pain management should include both nonpharmacologic and pharmacologic interventions.   Goal: Control of chronic pain  Description  Control of chronic pain  Outcome: Met This Shift

## 2019-04-30 NOTE — PROGRESS NOTES
Dr. Eamon Murrell notified of patient request for 10mg norco, stating she takes that amount at home and is not having adequate pain control.        Ermias Richardson RN  4/30/2019  6:05 PM

## 2019-04-30 NOTE — ED PROVIDER NOTES
Department of Emergency Medicine   ED  Provider Note  Admit Date/RoomTime: 4/29/2019  5:38 PM  ED Room: 23/23 4/30/19  12:07 AM    History of Present Illness:   Louie Zheng is a 59 y.o. female presenting to the ED for abdominal pain, beginning today. The complaint has been persistent, severe in severity, and worsened by nothing. She also complains of problem with her right nephrostomy tube as the dressing is falling off. Patient is currently undergoing chemotherapy for ovarian cancer. She reports history of COPD reports her breathing is at her baseline. Review of Systems:   Pertinent positives and negatives are stated within HPI, all other systems reviewed and are negative.          --------------------------------------------- PAST HISTORY ---------------------------------------------  Past Medical History:  has a past medical history of COPD (chronic obstructive pulmonary disease) ([facility] [address] Hyperlipidemia, Hypertension, PMB (postmenopausal bleeding), and Squamous cell carcinoma. Past Surgical History:  has a past surgical history that includes Ureter stent placement; Tonsillectomy; Mouth surgery; Dilation and curettage of uterus; hernia repair; laparoscopy; other surgical history (06/27/2017); other surgical history (N/A, 08/04/2017); Hip fracture surgery (Right, 3/4/2019); and Colonoscopy (N/A, 3/13/2019). Social History:  reports that she has been smoking cigarettes. She has a 40.00 pack-year smoking history. She has never used smokeless tobacco. She reports that she has current or past drug history. Drug: Marijuana. She reports that she does not drink alcohol. Family History: family history is not on file. The patients home medications have been reviewed. Allergies: Latex;  Other; Asa [aspirin]; and Contrast [barium-containing compounds]    -------------------------------------------------- RESULTS -------------------------------------------------  All laboratory and radiology results have been personally reviewed by myself   LABS:  Results for orders placed or performed during the hospital encounter of 04/29/19   CBC Auto Differential   Result Value Ref Range    WBC 5.6 4.5 - 11.5 E9/L    RBC 3.56 3.50 - 5.50 E12/L    Hemoglobin 10.7 (L) 11.5 - 15.5 g/dL    Hematocrit 34.1 34.0 - 48.0 %    MCV 95.8 80.0 - 99.9 fL    MCH 30.1 26.0 - 35.0 pg    MCHC 31.4 (L) 32.0 - 34.5 %    RDW 16.1 (H) 11.5 - 15.0 fL    Platelets 139 481 - 212 E9/L    MPV 9.2 7.0 - 12.0 fL    Neutrophils % 79.4 43.0 - 80.0 %    Immature Granulocytes % 0.4 0.0 - 5.0 %    Lymphocytes % 8.6 (L) 20.0 - 42.0 %    Monocytes % 9.0 2.0 - 12.0 %    Eosinophils % 2.2 0.0 - 6.0 %    Basophils % 0.4 0.0 - 2.0 %    Neutrophils # 4.44 1.80 - 7.30 E9/L    Immature Granulocytes # 0.02 E9/L    Lymphocytes # 0.48 (L) 1.50 - 4.00 E9/L    Monocytes # 0.50 0.10 - 0.95 E9/L    Eosinophils # 0.12 0.05 - 0.50 E9/L    Basophils # 0.02 0.00 - 0.20 E9/L    Anisocytosis 1+     Polychromasia 1+     Hypochromia 1+     Poikilocytes 1+     Arimo Cells 1+     Ovalocytes 1+    Comprehensive Metabolic Panel   Result Value Ref Range    Sodium 140 132 - 146 mmol/L    Potassium 3.9 3.5 - 5.0 mmol/L    Chloride 107 98 - 107 mmol/L    CO2 21 (L) 22 - 29 mmol/L    Anion Gap 12 7 - 16 mmol/L    Glucose 131 (H) 74 - 99 mg/dL    BUN 18 8 - 23 mg/dL    CREATININE 1.3 (H) 0.5 - 1.0 mg/dL    GFR Non-African American 42 >=60 mL/min/1.73    GFR African American 50     Calcium 8.3 (L) 8.6 - 10.2 mg/dL    Total Protein 6.4 6.4 - 8.3 g/dL    Alb 3.3 (L) 3.5 - 5.2 g/dL    Total Bilirubin <0.2 0.0 - 1.2 mg/dL    Alkaline Phosphatase 65 35 - 104 U/L    ALT <5 0 - 32 U/L    AST 8 0 - 31 U/L   Lactic Acid, Plasma   Result Value Ref Range    Lactic Acid 3.0 (H) 0.5 - 2.2 mmol/L   Lipase   Result Value Ref Range    Lipase 9 (L) 13 - 60 U/L   Urinalysis with Microscopic   Result Value Ref Range    Color, UA Yellow Straw/Yellow    Clarity, UA Clear Clear    Glucose, Ur Negative Negative mg/dL    Bilirubin Urine Negative Negative    Ketones, Urine Negative Negative mg/dL    Specific Gravity, UA 1.010 1.005 - 1.030    Blood, Urine LARGE (A) Negative    pH, UA 8.5 5.0 - 9.0    Protein,  (A) Negative mg/dL    Urobilinogen, Urine 0.2 <2.0 E.U./dL    Nitrite, Urine POSITIVE (A) Negative    Leukocyte Esterase, Urine LARGE (A) Negative    WBC, UA PACKED 0 - 5 /HPF    RBC, UA 5-10 (A) 0 - 2 /HPF    Bacteria, UA MANY (A) /HPF    Crystals Moderate        RADIOLOGY:  Interpreted by Radiologist.  CT ABDOMEN PELVIS WO CONTRAST   Final Result      1. Left ureteric catheters are in proper position, draining the left   kidney. 2. Catheter draining the left kidney in proper position. 3. Some degree of anasarca and discrete ascites. 4. Pattern of constipation. 5. Bilateral adrenal mass which can further increased size since   previous examination of December 2018. Metastasis to be considered. Please correlate clinically. 6. Midline retroperitoneal adenopathy.                    ------------------------- NURSING NOTES AND VITALS REVIEWED ---------------------------   The nursing notes within the ED encounter and vital signs as below have been reviewed.    BP (!) 148/69   Pulse 87   Temp 97.6 °F (36.4 °C) (Temporal)   Resp 20   Ht 5' 3\" (1.6 m)   Wt 96 lb (43.5 kg)   SpO2 99%   BMI 17.01 kg/m²   Oxygen Saturation Interpretation: Normal      ---------------------------------------------------PHYSICAL EXAM--------------------------------------    Constitutional/General: Alert and oriented x3, chronically ill appearing, cachectic, non toxic in NAD  Head: Normocephalic and atraumatic  Eyes: PERRL, EOMI, conjunctiva normal, sclera non icteric  Mouth: Oropharynx clear, handling secretions, no trismus, no asymmetry of the posterior oropharynx or uvular edema  Neck: Supple, full ROM, no stridor, no crepitus, no meningeal signs  Respiratory: Occasional wheezes, No rales, or rhonchi. Not in respiratory distress  Cardiovascular:  Regular rate. Regular rhythm. No murmurs, gallops, or rubs. 2+ distal pulses  Chest: No chest wall tenderness  GI:  Abdomen Soft, Diffusely tender, Non distended. +BS. No organomegaly, no palpable masses,  No rebound, guarding, or rigidity. Right nephrostomy tube with dressing falling off, site appears clean/dry/intact. Musculoskeletal: Moves all extremities x 4. Warm and well perfused, no clubbing, cyanosis, or edema. Capillary refill <3 seconds  Integument: skin warm and dry. No rashes.    Lymphatic: no lymphadenopathy noted  Neurologic: GCS 15, no focal deficits, symmetric strength 5/5 in the upper and lower extremities bilaterally  Psychiatric: Normal Affect      ------------------------------ ED COURSE/MEDICAL DECISION MAKING----------------------  Medications   sodium chloride flush 0.9 % injection 10 mL (has no administration in time range)   sodium chloride flush 0.9 % injection 10 mL (has no administration in time range)   magnesium hydroxide (MILK OF MAGNESIA) 400 MG/5ML suspension 30 mL (has no administration in time range)   ondansetron (ZOFRAN) injection 4 mg (has no administration in time range)   enoxaparin (LOVENOX) injection 40 mg (has no administration in time range)   cefTRIAXone (ROCEPHIN) 1 g in dextrose 5 % 50 mL IVPB (vial-mate) (has no administration in time range)   acetaminophen (TYLENOL) tablet 650 mg (has no administration in time range)   amLODIPine (NORVASC) tablet 5 mg (has no administration in time range)   docusate sodium (COLACE) capsule 100 mg (has no administration in time range)   fentaNYL (DURAGESIC) 100 MCG/HR 1 patch (has no administration in time range)   gabapentin (NEURONTIN) tablet 600 mg (has no administration in time range)   HYDROcodone-acetaminophen (NORCO) 5-325 MG per tablet 1 tablet (has no administration in time range)   metoclopramide (REGLAN) tablet 5 mg (has no administration in time range)

## 2019-04-30 NOTE — CONSULTS
Inpatient consult to Urology  Consult performed by: ELLIOTT Mendenhall - CNP  Consult ordered by: Shaniqua Pressley MD        4/30/2019 8:07 AM  Service: Urology  Group: [facility] (Rj/Shira/Arnol)    Jes Wesley  [id number]     Chief Complaint:    Right nephrostomy tube, left stent    History of Present Illness: The patient is a 59 y.o. female patient who has been seen by [facility] urology in the past. She is a pt by Dr. Florie Hashimoto and has been diagnosed with advanced cervical cancer with ureteral obstruction in 2017. She is undergoing chemotherapy. Dr. Kathy Andrade is her oncologist. She has stage IV disease. She had a nephrostomy tube placed on the right after her right stent fell out and she had it exchanged 3/15/19. She had a nephrostogram on 3/28/19 to check placement after the tube became slightly dislodged. She is not sure when her left stent was exchanged last but thinks Dr. Catalina Homans wanted to wait another month. She came to the ER with diffuse abdominal pain, suprapubic mostly, and concerns that her nephrostomy tube is coming off. She denies fever but has chills. + nausea. Denies any dysuria.        Past Medical History:   Diagnosis Date    COPD (chronic obstructive pulmonary disease) ([facility] [address]     Hyperlipidemia     Hypertension     PMB (postmenopausal bleeding)     Squamous cell carcinoma 02/06/2017    Invasive non-keratinizing squamous cell carcinoma with extensive squamous cell/severe dysplasia LAY 3         Past Surgical History:   Procedure Laterality Date    APPENDECTOMY      COLONOSCOPY N/A 3/13/2019    COLONOSCOPY WITH BIOPSY performed by Nirali Dobbins MD at [address] Right 3/4/2019    HIP OPEN REDUCTION INTERNAL FIXATION performed by Governor Bogdan MD at [place] [address] Right     r hip surgery    LAPAROSCOPY      MOUTH SURGERY      NEPHROSTOMY Right     OTHER SURGICAL HISTORY  06/27/2017    Tandem and Ovoid insertion     OTHER SURGICAL HISTORY N/A 08/04/2017    TONSILLECTOMY      TONSILLECTOMY      URETER STENT PLACEMENT      kidney stents due to kidney failure       Medications Prior to Admission:    Medications Prior to Admission: metoclopramide (REGLAN) 5 MG tablet, Take 5 mg by mouth 4 times daily  HYDROcodone-acetaminophen (NORCO) 5-325 MG per tablet, Take 1 tablet by mouth every 6 hours as needed for Pain. amLODIPine (NORVASC) 5 MG tablet, Take 1 tablet by mouth daily  opium-belladonna (B&O SUPPRETTES) 16.2-60 MG suppository, Place 1 suppository rectally 2 times daily as needed for Pain for up to 7 days. Osito Ames gabapentin (NEURONTIN) 600 MG tablet, Take 1 tablet by mouth 3 times daily. .  sertraline (ZOLOFT) 50 MG tablet, Take 50 mg by mouth daily  fentaNYL (DURAGESIC) 100 MCG/HR, Place 1 patch onto the skin every 72 hours. .  methadone (DOLOPHINE) 10 MG tablet, Take 10 mg by mouth every 8 hours as needed for Pain. Osito Ames oxybutynin (DITROPAN) 5 MG tablet, Take 5 mg by mouth daily   docusate sodium (COLACE) 100 MG capsule, Take 100 mg by mouth 2 times daily  acetaminophen (TYLENOL) 325 MG tablet, Take 650 mg by mouth every 6 hours as needed for Pain    Allergies:    Latex; Asa [aspirin]; Other; and Contrast [barium-containing compounds]    Social History:    reports that she has been smoking cigarettes. She has a 10.00 pack-year smoking history. She has never used smokeless tobacco. She reports that she has current or past drug history. Drug: Marijuana. She reports that she does not drink alcohol. Family History:   Non-contributory to this Urological problem  family history is not on file.     Review of Systems:  Constitutional: + chills, weight loss  Respiratory: + wheezing and cough, COPD  Cardiovascular: negative for chest pain and dyspnea  Gastrointestinal: suprapubic pain  Derm: negative for rash and skin lesion(s)  Neurological: negative for seizures and tremors  Musculoskeletal: weakness  Endocrine: negative for diabetic symptoms including polydipsia and polyuria  Psychiatric: negative  : As above in the HPI, otherwise negative  All other reviews are negative    Physical Exam:     Vitals:  /84   Pulse 75   Temp 98.8 °F (37.1 °C) (Oral)   Resp 18   Ht 5' 3\" (1.6 m)   Wt 90 lb 4.8 oz (41 kg)   SpO2 98%   BMI 16.00 kg/m²     General:  Awake, alert, oriented X 3. Well developed, frail, chronically ill. HEENT:  Normocephalic, atraumatic. Pupils equal, round. No scleral icterus. No conjunctival injection. Normal lips, teeth, and gums. No nasal discharge. Neck:  Supple, no masses. Heart:  RRR  Lungs:  audible wheezing. Respirations symmetric and non-labored. Abdomen:  soft, nontender, no masses.  Right nephrostomy tube with thick yellow urine  Extremities:  No clubbing, cyanosis, or edema  Skin:  Warm and dry, no open lesions or rashes  Neuro: No focal motor deficits  Rectal: deferred  Genitalia: deferred    Labs:     Recent Labs     04/29/19  1904 04/30/19  0705   WBC 5.6 5.6   RBC 3.56 3.64   HGB 10.7* 11.4*   HCT 34.1 36.1   MCV 95.8 99.2   MCH 30.1 31.3   MCHC 31.4* 31.6*   RDW 16.1* 16.1*    304   MPV 9.2 8.6         Recent Labs     04/29/19  1904 04/30/19  0705   CREATININE 1.3* 1.4*     4/29/2019  7:36 PM - Derick, Mhy Incoming Results From Liquid5     Component Value Ref Range & Units Status Collected Lab   Color, UA Yellow  Straw/Yellow Final 04/29/2019  7:04 PM  - [address], UA Clear  Clear Final 04/29/2019  7:04 PM  - [address] Lab   Glucose, Ur Negative  Negative mg/dL Final 04/29/2019  7:04 PM  - [address] Lab   Bilirubin Urine Negative  Negative Final 04/29/2019  7:04 PM  - [address] [facility]   Ketones, Urine Negative  Negative mg/dL Final 04/29/2019  7:04 PM  - [address] [facility]   Specific [place], UA 1.010  1.005 - 1.030 Final 04/29/2019  7:04 PM  - [facility]   Blood, Urine LARGEAbnormal   Negative Final 2019  7:04 PM  - [address] Lab   pH, UA 8.5  5.0 - 9.0 Final 2019  7:04 PM  - [address] Lab   Protein, UA 100Abnormal   Negative mg/dL Final 2019  7:04 PM MH - [address], Urine 0.2  <2.0 E.U./dL Final 2019  7:04 PM  - [address] [facility] Lab   Nitrite, Urine POSITIVEAbnormal   Negative Final 2019  7:04 PM  - [address] Lab   Leukocyte Esterase, Urine LARGEAbnormal   Negative Final 2019  7:04 PM  - [address] [facility] Lab   WBC, UA PACKED  0 - 5 /HPF Final 2019  7:04 PM  - [address] Lab   RBC, UA 5-10Abnormal   0 - 2 /HPF Final 2019  7:04 PM  - [address] Lab   Bacteria, UA MANYAbnormal   /HPF Final 2019  7:04 PM  - [address] Lab   Crystals Moderate   Final 2019  7:04 PM      Narrative   Patient MRN:  [id number]   : 1960   Age: 59 years   Gender: Female       Order Date:  2019 6:00 PM               TECHNIQUE/NUMBER OF IMAGES/COMPARISON/CLINICAL HISTORY:       CT abdomen and pelvis no contrast       Axial images were obtained with sagittal and coronal reconstructions   are.       Comparison study December 15, 2018.       CLINICAL HISTORY: 8year-old the female patient with the abdominal   pain.       Images: 209.       FINDINGS: This has a catheter draining the left ureter. The catheter   is in proper position.       There is a percutaneous catheter draining the right kidney. The   catheter is in proper position.       There are some fullness of the collecting system of both kidneys but   also kidneys or in the compressed by the catheters.       There is a large mass lesion in the left adrenal gland which has   increased size since the previous examination presently measures 4.5 x   2.8 cm previously it measured 2.6 x 2.2 cm there are.  There is also   enlargement of the right adrenal gland more prominent since the   previous study.       Also as observed previously there would like stent placedand nephrostomy tube removed. Will ask IR to place. Creatinine stable. Pt seen with Dr. Leland Mcghee, NP-[facility]  April 30, 2019              Electronically signed by ELLIOTT Saunders CNP on 4/30/2019 at 8:07 AM         The patient was seen and examined. I have reviewed the medical record in detail. I agree with the plan as outlined by JONNY Lam.     Mary Agrawal MD  5:19 PM  4/30/2019

## 2019-05-01 ENCOUNTER — ANESTHESIA EVENT (OUTPATIENT)
Dept: INTERVENTIONAL RADIOLOGY/VASCULAR | Age: 59
DRG: 689 | End: 2019-05-01
Payer: OTHER GOVERNMENT

## 2019-05-01 VITALS — SYSTOLIC BLOOD PRESSURE: 136 MMHG | OXYGEN SATURATION: 100 % | DIASTOLIC BLOOD PRESSURE: 94 MMHG

## 2019-05-01 LAB — URINE CULTURE, ROUTINE: NORMAL

## 2019-05-01 PROCEDURE — 6360000002 HC RX W HCPCS: Performed by: HOSPITALIST

## 2019-05-01 PROCEDURE — 6360000002 HC RX W HCPCS: Performed by: INTERNAL MEDICINE

## 2019-05-01 PROCEDURE — 3700000001 HC ADD 15 MINUTES (ANESTHESIA)

## 2019-05-01 PROCEDURE — 0T763DZ DILATION OF RIGHT URETER WITH INTRALUMINAL DEVICE, PERCUTANEOUS APPROACH: ICD-10-PCS | Performed by: UROLOGY

## 2019-05-01 PROCEDURE — 2580000003 HC RX 258: Performed by: HOSPITALIST

## 2019-05-01 PROCEDURE — 6360000004 HC RX CONTRAST MEDICATION: Performed by: RADIOLOGY

## 2019-05-01 PROCEDURE — 2140000000 HC CCU INTERMEDIATE R&B

## 2019-05-01 PROCEDURE — 2580000003 HC RX 258: Performed by: INTERNAL MEDICINE

## 2019-05-01 PROCEDURE — 2500000003 HC RX 250 WO HCPCS: Performed by: RADIOLOGY

## 2019-05-01 PROCEDURE — C2617 STENT, NON-COR, TEM W/O DEL: HCPCS

## 2019-05-01 PROCEDURE — 3700000000 HC ANESTHESIA ATTENDED CARE

## 2019-05-01 PROCEDURE — 50693 PLMT URETERAL STENT PRQ: CPT | Performed by: RADIOLOGY

## 2019-05-01 PROCEDURE — 6370000000 HC RX 637 (ALT 250 FOR IP): Performed by: FAMILY MEDICINE

## 2019-05-01 PROCEDURE — 6370000000 HC RX 637 (ALT 250 FOR IP): Performed by: HOSPITALIST

## 2019-05-01 PROCEDURE — 6360000002 HC RX W HCPCS: Performed by: NURSE ANESTHETIST, CERTIFIED REGISTERED

## 2019-05-01 PROCEDURE — 2580000003 HC RX 258: Performed by: NURSE ANESTHETIST, CERTIFIED REGISTERED

## 2019-05-01 PROCEDURE — 6360000002 HC RX W HCPCS: Performed by: FAMILY MEDICINE

## 2019-05-01 PROCEDURE — 2500000003 HC RX 250 WO HCPCS: Performed by: NURSE ANESTHETIST, CERTIFIED REGISTERED

## 2019-05-01 PROCEDURE — 0T25X0Z CHANGE DRAINAGE DEVICE IN KIDNEY, EXTERNAL APPROACH: ICD-10-PCS | Performed by: UROLOGY

## 2019-05-01 RX ORDER — FENTANYL CITRATE 50 UG/ML
INJECTION, SOLUTION INTRAMUSCULAR; INTRAVENOUS PRN
Status: DISCONTINUED | OUTPATIENT
Start: 2019-05-01 | End: 2019-05-01 | Stop reason: SDUPTHER

## 2019-05-01 RX ORDER — LABETALOL HYDROCHLORIDE 5 MG/ML
INJECTION, SOLUTION INTRAVENOUS PRN
Status: DISCONTINUED | OUTPATIENT
Start: 2019-05-01 | End: 2019-05-01 | Stop reason: SDUPTHER

## 2019-05-01 RX ORDER — MIDAZOLAM HYDROCHLORIDE 1 MG/ML
INJECTION INTRAMUSCULAR; INTRAVENOUS PRN
Status: DISCONTINUED | OUTPATIENT
Start: 2019-05-01 | End: 2019-05-01 | Stop reason: SDUPTHER

## 2019-05-01 RX ORDER — LIDOCAINE HYDROCHLORIDE 20 MG/ML
3 INJECTION, SOLUTION INFILTRATION; PERINEURAL ONCE
Status: COMPLETED | OUTPATIENT
Start: 2019-05-01 | End: 2019-05-01

## 2019-05-01 RX ORDER — HEPARIN SODIUM (PORCINE) LOCK FLUSH IV SOLN 100 UNIT/ML 100 UNIT/ML
100 SOLUTION INTRAVENOUS PRN
Status: DISCONTINUED | OUTPATIENT
Start: 2019-05-01 | End: 2019-05-06 | Stop reason: HOSPADM

## 2019-05-01 RX ORDER — HYDROCODONE BITARTRATE AND ACETAMINOPHEN 10; 325 MG/1; MG/1
1 TABLET ORAL EVERY 6 HOURS PRN
Status: DISCONTINUED | OUTPATIENT
Start: 2019-05-01 | End: 2019-05-03

## 2019-05-01 RX ORDER — SODIUM CHLORIDE 9 MG/ML
INJECTION, SOLUTION INTRAVENOUS CONTINUOUS PRN
Status: DISCONTINUED | OUTPATIENT
Start: 2019-05-01 | End: 2019-05-01 | Stop reason: SDUPTHER

## 2019-05-01 RX ORDER — PROPOFOL 10 MG/ML
INJECTION, EMULSION INTRAVENOUS CONTINUOUS PRN
Status: DISCONTINUED | OUTPATIENT
Start: 2019-05-01 | End: 2019-05-01 | Stop reason: SDUPTHER

## 2019-05-01 RX ORDER — PROPOFOL 10 MG/ML
INJECTION, EMULSION INTRAVENOUS PRN
Status: DISCONTINUED | OUTPATIENT
Start: 2019-05-01 | End: 2019-05-01 | Stop reason: SDUPTHER

## 2019-05-01 RX ORDER — HEPARIN SODIUM (PORCINE) LOCK FLUSH IV SOLN 100 UNIT/ML 100 UNIT/ML
100 SOLUTION INTRAVENOUS DAILY
Status: DISCONTINUED | OUTPATIENT
Start: 2019-05-01 | End: 2019-05-06 | Stop reason: HOSPADM

## 2019-05-01 RX ORDER — ONDANSETRON 2 MG/ML
INJECTION INTRAMUSCULAR; INTRAVENOUS PRN
Status: DISCONTINUED | OUTPATIENT
Start: 2019-05-01 | End: 2019-05-01 | Stop reason: SDUPTHER

## 2019-05-01 RX ADMIN — METHADONE HYDROCHLORIDE 10 MG: 10 TABLET ORAL at 22:09

## 2019-05-01 RX ADMIN — PIPERACILLIN AND TAZOBACTAM 3.38 G: 3; .375 INJECTION, POWDER, FOR SOLUTION INTRAVENOUS at 13:20

## 2019-05-01 RX ADMIN — ONDANSETRON HYDROCHLORIDE 4 MG: 2 INJECTION, SOLUTION INTRAMUSCULAR; INTRAVENOUS at 11:51

## 2019-05-01 RX ADMIN — HYDROCODONE BITARTRATE AND ACETAMINOPHEN 1 TABLET: 10; 325 TABLET ORAL at 14:29

## 2019-05-01 RX ADMIN — FENTANYL CITRATE 50 MCG: 50 INJECTION, SOLUTION INTRAMUSCULAR; INTRAVENOUS at 10:59

## 2019-05-01 RX ADMIN — LIDOCAINE HYDROCHLORIDE 3 ML: 20 INJECTION, SOLUTION EPIDURAL; INFILTRATION; INTRACAUDAL; PERINEURAL at 11:03

## 2019-05-01 RX ADMIN — PROPOFOL 20 MG: 10 INJECTION, EMULSION INTRAVENOUS at 10:46

## 2019-05-01 RX ADMIN — ONDANSETRON HYDROCHLORIDE 4 MG: 2 SOLUTION INTRAMUSCULAR; INTRAVENOUS at 05:42

## 2019-05-01 RX ADMIN — METOCLOPRAMIDE 5 MG: 5 TABLET ORAL at 20:26

## 2019-05-01 RX ADMIN — Medication 10 ML: at 13:20

## 2019-05-01 RX ADMIN — FENTANYL CITRATE 25 MCG: 50 INJECTION, SOLUTION INTRAMUSCULAR; INTRAVENOUS at 10:42

## 2019-05-01 RX ADMIN — AMLODIPINE BESYLATE 5 MG: 5 TABLET ORAL at 13:19

## 2019-05-01 RX ADMIN — PIPERACILLIN AND TAZOBACTAM 3.38 G: 3; .375 INJECTION, POWDER, FOR SOLUTION INTRAVENOUS at 02:11

## 2019-05-01 RX ADMIN — IOVERSOL 65 ML: 678 INJECTION INTRA-ARTERIAL; INTRAVENOUS at 12:52

## 2019-05-01 RX ADMIN — NICOTINE POLACRILEX 4 MG: 2 GUM, CHEWING BUCCAL at 21:26

## 2019-05-01 RX ADMIN — PIPERACILLIN AND TAZOBACTAM 3.38 G: 3; .375 INJECTION, POWDER, FOR SOLUTION INTRAVENOUS at 20:26

## 2019-05-01 RX ADMIN — HYDROCODONE BITARTRATE AND ACETAMINOPHEN 1 TABLET: 5; 325 TABLET ORAL at 01:40

## 2019-05-01 RX ADMIN — LABETALOL HYDROCHLORIDE 2.5 MG: 5 INJECTION INTRAVENOUS at 11:12

## 2019-05-01 RX ADMIN — GABAPENTIN 600 MG: 600 TABLET, FILM COATED ORAL at 13:19

## 2019-05-01 RX ADMIN — METHADONE HYDROCHLORIDE 10 MG: 10 TABLET ORAL at 13:07

## 2019-05-01 RX ADMIN — DOCUSATE SODIUM 100 MG: 100 CAPSULE, LIQUID FILLED ORAL at 13:19

## 2019-05-01 RX ADMIN — HYDROCODONE BITARTRATE AND ACETAMINOPHEN 1 TABLET: 10; 325 TABLET ORAL at 20:26

## 2019-05-01 RX ADMIN — FENTANYL CITRATE 25 MCG: 50 INJECTION, SOLUTION INTRAMUSCULAR; INTRAVENOUS at 11:06

## 2019-05-01 RX ADMIN — OXYBUTYNIN CHLORIDE 5 MG: 5 TABLET ORAL at 13:19

## 2019-05-01 RX ADMIN — PROPOFOL 20 MG: 10 INJECTION, EMULSION INTRAVENOUS at 10:50

## 2019-05-01 RX ADMIN — METOCLOPRAMIDE 5 MG: 5 TABLET ORAL at 17:07

## 2019-05-01 RX ADMIN — METOCLOPRAMIDE 5 MG: 5 TABLET ORAL at 13:19

## 2019-05-01 RX ADMIN — GABAPENTIN 600 MG: 600 TABLET, FILM COATED ORAL at 20:27

## 2019-05-01 RX ADMIN — HYDROCODONE BITARTRATE AND ACETAMINOPHEN 1 TABLET: 5; 325 TABLET ORAL at 07:50

## 2019-05-01 RX ADMIN — HEPARIN 100 UNITS: 100 SYRINGE at 20:26

## 2019-05-01 RX ADMIN — SERTRALINE 50 MG: 50 TABLET, FILM COATED ORAL at 13:19

## 2019-05-01 RX ADMIN — SODIUM CHLORIDE: 9 INJECTION, SOLUTION INTRAVENOUS at 10:25

## 2019-05-01 RX ADMIN — PROPOFOL 30 MG: 10 INJECTION, EMULSION INTRAVENOUS at 10:42

## 2019-05-01 RX ADMIN — ONDANSETRON HYDROCHLORIDE 4 MG: 2 SOLUTION INTRAMUSCULAR; INTRAVENOUS at 14:29

## 2019-05-01 RX ADMIN — PROPOFOL 120 MCG/KG/MIN: 10 INJECTION, EMULSION INTRAVENOUS at 10:42

## 2019-05-01 RX ADMIN — Medication 10 ML: at 20:27

## 2019-05-01 RX ADMIN — MIDAZOLAM HYDROCHLORIDE 2 MG: 1 INJECTION, SOLUTION INTRAMUSCULAR; INTRAVENOUS at 10:36

## 2019-05-01 ASSESSMENT — PULMONARY FUNCTION TESTS
PIF_VALUE: 0
PIF_VALUE: 1
PIF_VALUE: 0
PIF_VALUE: 0
PIF_VALUE: 1
PIF_VALUE: 0
PIF_VALUE: 1
PIF_VALUE: 0
PIF_VALUE: 1
PIF_VALUE: 0
PIF_VALUE: 1
PIF_VALUE: 0
PIF_VALUE: 1
PIF_VALUE: 0

## 2019-05-01 ASSESSMENT — PAIN - FUNCTIONAL ASSESSMENT
PAIN_FUNCTIONAL_ASSESSMENT: ACTIVITIES ARE NOT PREVENTED
PAIN_FUNCTIONAL_ASSESSMENT: PREVENTS OR INTERFERES SOME ACTIVE ACTIVITIES AND ADLS

## 2019-05-01 ASSESSMENT — PAIN DESCRIPTION - PROGRESSION
CLINICAL_PROGRESSION: NOT CHANGED

## 2019-05-01 ASSESSMENT — PAIN DESCRIPTION - PAIN TYPE
TYPE: CHRONIC PAIN

## 2019-05-01 ASSESSMENT — PAIN DESCRIPTION - ONSET: ONSET: ON-GOING

## 2019-05-01 ASSESSMENT — PAIN DESCRIPTION - DESCRIPTORS
DESCRIPTORS: SORE;ACHING;DISCOMFORT
DESCRIPTORS: ACHING;SORE;DISCOMFORT
DESCRIPTORS: ACHING;SORE;DISCOMFORT
DESCRIPTORS: ACHING;DISCOMFORT;SORE
DESCRIPTORS: SORE;ACHING;DISCOMFORT

## 2019-05-01 ASSESSMENT — PAIN SCALES - GENERAL
PAINLEVEL_OUTOF10: 4
PAINLEVEL_OUTOF10: 3
PAINLEVEL_OUTOF10: 10
PAINLEVEL_OUTOF10: 10
PAINLEVEL_OUTOF10: 8
PAINLEVEL_OUTOF10: 5
PAINLEVEL_OUTOF10: 4
PAINLEVEL_OUTOF10: 10
PAINLEVEL_OUTOF10: 10
PAINLEVEL_OUTOF10: 8

## 2019-05-01 ASSESSMENT — PAIN DESCRIPTION - FREQUENCY
FREQUENCY: CONTINUOUS
FREQUENCY: CONTINUOUS

## 2019-05-01 ASSESSMENT — LIFESTYLE VARIABLES: SMOKING_STATUS: 1

## 2019-05-01 ASSESSMENT — PAIN DESCRIPTION - LOCATION
LOCATION: HIP;BACK
LOCATION: GENERALIZED

## 2019-05-01 ASSESSMENT — PAIN DESCRIPTION - ORIENTATION: ORIENTATION: RIGHT;LEFT

## 2019-05-01 ASSESSMENT — COPD QUESTIONNAIRES: CAT_SEVERITY: NO INTERVAL CHANGE

## 2019-05-01 NOTE — PROGRESS NOTES
3368 - Patient in Radiology department for . Allergies, medications, H&P and fasting instructions reviewed with patient. Vital signs taken. Dr Yemi Tirado came and talked to patient. Procedural instructions given, questions answered, understanding expressed and consent signed. Fluoroscopy education provided, questions answered. 1035 - Patient taken to Angio, positioned on table prone with O2 and monitoring equipment attached, care turned over to anesthesia. 1052 - Patient prepped and draped per protocol. 1101 - Timeout done. 1129 -  8 Fr x 22 cm ureteral stent placed right kidney with fluoroscopy guidance guidance by Dr Yemi Tirado. 1149 - 8 Fr nephrostomy tube exchanged. 1152 - Puncture site cleansed, sutured to secure tube and tube connected to drain bag. Patient tolerated well. Procedure completed. 1200 - Patient transferred back to cart. Patient taken to recovery / holding area. 1240 - Patient put in for transfer. Report called to [facility]. 1250 - Patient taken to room via transport.

## 2019-05-01 NOTE — CONSULTS
[facility]     Consult Note                                 [address], [address]                   Phone [phone]     Fax [phone]        Date:   5/1/2019  Patient name:  Adilene Oquendo  Date of admission:  4/29/2019  5:38 PM  MRN:   [id number]  YOB: 1960    Reason for Consult:  UTI    CC:   Chief Complaint   Patient presents with    Wound Check     right nephrostomy tube hanging out, hx of ovarian CA     Generalized Body Aches     started before the drain came out but started to get worse        HISTORY OF PRESENT ILLNESS:                The patient is a 59 y.o. female with past medical history of cervical cancer status post chemotherapy last received almost a month ago according to the patient through right chest [facility]. She has been seen previously by infectious disease service in November 2018 she was treated for candiduria with Diflucan. She has history of ureteral obstruction, she had a nephrostomy tube placed on the right side after her right-sided stent fell out and it was exchange on 3/15/2019, a nephrostogram on 3/28/2019 was done to check placement of the tube after it became slightly dislodged, so has left-sided urinary stent. Comes to the ER with diffuse abdominal pain, suprapubic pain, no fever chills. Her UA shows packed WBCs which was  obtained from clean catch and not nephrostomy tube. Urine culture shows mixed growth, CT abdomen pelvis without contrast shows left ureteric catheter in position      Past Medical History:   has a past medical history of COPD (chronic obstructive pulmonary disease) ([address] Hyperlipidemia, Hypertension, PMB (postmenopausal bleeding), and Squamous cell carcinoma. Past Surgical History:   has a past surgical history that includes Ureter stent placement;  Tonsillectomy; Mouth surgery; Dilation and curettage of uterus; hernia repair; laparoscopy; other surgical history (06/27/2017); other surgical history (N/A, 08/04/2017); Hip fracture surgery (Right, 3/4/2019); Colonoscopy (N/A, 3/13/2019); joint replacement (Right); Nephrostomy (Right); Appendectomy; and Tonsillectomy. Home Medications:    Prior to Admission medications    Medication Sig Start Date End Date Taking? Authorizing Provider   metoclopramide (REGLAN) 5 MG tablet Take 5 mg by mouth 4 times daily   Yes Historical Provider, MD   HYDROcodone-acetaminophen (NORCO) 5-325 MG per tablet Take 1 tablet by mouth every 6 hours as needed for Pain. Yes Historical Provider, MD   amLODIPine (NORVASC) 5 MG tablet Take 1 tablet by mouth daily 11/14/18  Yes Carlos Pacheco MD   opium-belladonna (B&O SUPPRETTES) 16.2-60 MG suppository Place 1 suppository rectally 2 times daily as needed for Pain for up to 7 days. . 11/13/18 4/29/19 Yes Carlos Pacheco MD   gabapentin (NEURONTIN) 600 MG tablet Take 1 tablet by mouth 3 times daily. . 10/18/18  Yes Historical Provider, MD   sertraline (ZOLOFT) 50 MG tablet Take 50 mg by mouth daily   Yes Historical Provider, MD   fentaNYL (DURAGESIC) 100 MCG/HR Place 1 patch onto the skin every 72 hours. .   Yes Historical Provider, MD   methadone (DOLOPHINE) 10 MG tablet Take 10 mg by mouth every 8 hours as needed for Pain. .   Yes Historical Provider, MD   oxybutynin (DITROPAN) 5 MG tablet Take 5 mg by mouth daily    Yes Historical Provider, MD   docusate sodium (COLACE) 100 MG capsule Take 100 mg by mouth 2 times daily   Yes Historical Provider, MD   acetaminophen (TYLENOL) 325 MG tablet Take 650 mg by mouth every 6 hours as needed for Pain    Historical Provider, MD       Allergies:  Latex; Asa [aspirin]; Other; and Contrast [barium-containing compounds]    Social History:   reports that she has been smoking cigarettes. She has a 10.00 pack-year smoking history. She has never used smokeless tobacco. She reports that she has current or past drug history. Drug: Marijuana. She reports that she does not drink alcohol. Family History: family history is not on file. REVIEW OF SYSTEMS:    12 point ROS has been done and pertinent neg and positive has been included in HPI and rest are non contributory          PHYSICAL EXAM:    BP (!) 114/55   Pulse 63   Temp 98.3 °F (36.8 °C)   Resp 12   Ht 5' 3\" (1.6 m)   Wt 96 lb (43.5 kg)   SpO2 100%   BMI 17.01 kg/m²    VENT SETTINGS:        General appearance: Alert, Awake, Oriented times 3, no distress  Skin: Warm and dry  Eyes: Pink palpebral conjunctivae. PERRL  Ears: External ears normal.  Nose/Sinuses: Nares normal. Septum midline. Mucosa normal. No sinus tenderness. Oropharynx: Oropharynx clear with no exudates seen  Neck: Neck supple. No jugular venous distension, lymphadenopathy or thyromegaly Trachea midline  Lungs: Lungs clear to auscultation bilaterally. No rhonchi, crackles or wheezes  Heart: S1 S2  Regular rate and rhythm. No rub, murmur or gallop  Abdomen: Abdomen soft, non-tender. BS normal. No masses, organomegaly  Extremities: No edema, Peripheral pulses palpable  Musculoskeletal: Muscular strength appears intact. No joint effusion, tenderness, swelling or warmth      DATA:    Labs:     Last 3 CBC:  Recent Labs     04/29/19 1904 04/30/19  0705   WBC 5.6 5.6   RBC 3.56 3.64   HGB 10.7* 11.4*    304   MPV 9.2 8.6       Last 3 BMP  Recent Labs     04/29/19 1904 04/30/19  0705    140   K 3.9 3.8    106   CO2 21* 20*   BUN 18 16   CREATININE 1.3* 1.4*   GLUCOSE 131* 94   CALCIUM 8.3* 8.2*       LIVER PROFILE:  Recent Labs     04/29/19 1904   AST 8   ALT <5   LABALBU 3.3*       URINARY CATHETER OUTPUT (Estrella):  Nephrostomy Right-Output (mL): 200 mL    DRAIN/TUBE OUTPUT:     Microbiology :  Recent Labs     04/30/19  0155   BC 24 Hours- no growth     No results for input(s): Patricia Amis in the last 72 hours. Recent Labs     04/29/19 1904   LABURIN ,000 CFU/mL  Mixed marnie isolated.

## 2019-05-01 NOTE — INTERVAL H&P NOTE
H&P Update    Patient's H and P from 4/30/2019 reviewed. UTI. On Zosyn. Discussed with Urology Adilene GAO, who discussed with Dr. Michelle Quarles, who cleared pt. for placement of ureteral stent. A/P:  Right ureteral stent placement. Exchange of right nephrostomy catheter.     Electronically signed by Fabian Mitchell MD on 5/1/2019 at 10:57 AM

## 2019-05-01 NOTE — PROGRESS NOTES
Hospitalist Progress Note      Synopsis: Patient admitted on 4/29/2019  58 y.o. female PMH of COPD, HTN, HLD, advanced cervical cancer on chemo next RX may 15, presenting to the ED for abdominal pain, beginning today. Clarence Barrera also complains of problem with her right nephrostomy tube as the dressing is falling off.  She reports history of COPD reports her breathing is at her baseline. She has fever chills, no vomiting but has nausea no changes in her pulmo status, on RA, in ER found to have uti, lab normal WBC, hb 10.7, UA grossly abnormal, ctap showed left ureteric cath in proper position, BL adrenal mass increased in size since December 2018    >> admitted for abdominal pain / R nephrostomy tube dislogement  >> Uro / ID consultation - IR to place stent and remove NT      Subjective    No complaints  IR for stent placement and removal of NT today per uro communication  Wants increase in her pain meds  She reports pain. Exam:  BP (!) 118/57   Pulse 70   Temp 97.8 °F (36.6 °C) (Oral)   Resp 16   Ht 5' 3\" (1.6 m)   Wt 96 lb (43.5 kg)   SpO2 98%   BMI 17.01 kg/m²   General appearance: No apparent distress, Chronically ill appearing. HEENT: Pupils equal, round, and reactive to light. Conjunctivae/corneas clear. Neck: Supple. No jugular venous distention. Trachea midline. Respiratory:  Normal respiratory effort. Clear to auscultation, bilaterally without Rales/Wheezes/Rhonchi. Cardiovascular: Regular rate and rhythm with normal S1/S2 without murmurs, rubs or gallops. Abdomen: Soft, non-tender, non-distended with normal bowel sounds. Musculoskeletal: No clubbing, cyanosis or edema bilaterally. Brisk capillary refill. 2+ lower extremity pulses (dorsalis pedis).    Skin:  No rashes    Neurologic: awake, alert and following commands     Medications:  Reviewed    Infusion Medications   Scheduled Medications    piperacillin-tazobactam  3.375 g Intravenous Q8H    sodium chloride flush  10 mL Intravenous 2 times per day    enoxaparin  40 mg Subcutaneous Daily    amLODIPine  5 mg Oral Daily    docusate sodium  100 mg Oral BID    fentaNYL  1 patch Transdermal Q72H    gabapentin  600 mg Oral TID    metoclopramide  5 mg Oral 4x Daily AC & HS    oxybutynin  5 mg Oral Daily    sertraline  50 mg Oral Daily     PRN Meds: sodium chloride flush, magnesium hydroxide, ondansetron, acetaminophen, opium-belladonna, methadone    I/O    Intake/Output Summary (Last 24 hours) at 5/1/2019 1133  Last data filed at 5/1/2019 0653  Gross per 24 hour   Intake 300 ml   Output 100 ml   Net 200 ml       Labs:   Recent Labs     04/29/19  1904 04/30/19  0705   WBC 5.6 5.6   HGB 10.7* 11.4*   HCT 34.1 36.1    304       Recent Labs     04/29/19  1904 04/30/19  0705    140   K 3.9 3.8    106   CO2 21* 20*   BUN 18 16   CREATININE 1.3* 1.4*   CALCIUM 8.3* 8.2*       Recent Labs     04/29/19  1904   PROT 6.4   ALKPHOS 65   ALT <5   AST 8   BILITOT <0.2   LIPASE 9*       Recent Labs     04/30/19  1315   INR 1.1       No results for input(s): CKTOTAL, TROPONINI in the last 72 hours. Chronic labs:  Lab Results   Component Value Date    INR 1.1 04/30/2019       Radiology:  Imaging studies reviewed today. ASSESSMENT:    Acute Complicated cystitis  Right nephrostomy tube dislogement  Advanced cervical cancer with metastatic disease  COPD - stable  Hypertension - stable  Hyperlipidemia - on statin  CKD 3  Lactic acodosis - RESOLVED  Chronic anemia - stable  Chronic opioid use  Severe protein calorie malnutrition     PLAN:    -- ID consultation for complicated cystitis  -- Urology for R NT, left stent and bilateral nephrosis  -- IR - stent placement and NT removal 5/1  -- SCr stable  -- Monitor labs    Controlled Substances Monitoring:    RX Monitoring 5/1/2019   Attestation The Prescription Monitoring Report for this patient was reviewed today. Acute Pain Prescriptions Not required given exclusionary diagnoses. ..    Chronic

## 2019-05-01 NOTE — PROGRESS NOTES
Contacted via phone the [facility] for oncology consult. Jeannie Thomason will see the patient.

## 2019-05-01 NOTE — ANESTHESIA PRE PROCEDURE
Department of Anesthesiology  Preprocedure Note       Name:  Martin Rivera   Age:  59 y.o.  :  1960                                          MRN:  [id number]         Date:  2019      Surgeon: * Surgery not found *    Procedure: [facility] W ANESTHESIA    Medications prior to admission:   Prior to Admission medications    Medication Sig Start Date End Date Taking? Authorizing Provider   metoclopramide (REGLAN) 5 MG tablet Take 5 mg by mouth 4 times daily    Historical Provider, MD   HYDROcodone-acetaminophen (NORCO) 5-325 MG per tablet Take 1 tablet by mouth every 6 hours as needed for Pain. Historical Provider, MD   amLODIPine (NORVASC) 5 MG tablet Take 1 tablet by mouth daily 18   Katey Spring MD   opium-belladonna (B&O SUPPRETTES) 16.2-60 MG suppository Place 1 suppository rectally 2 times daily as needed for Pain for up to 7 days. . 18  Katey Spring MD   gabapentin (NEURONTIN) 600 MG tablet Take 1 tablet by mouth 3 times daily. . 10/18/18   Historical Provider, MD   sertraline (ZOLOFT) 50 MG tablet Take 50 mg by mouth daily    Historical Provider, MD   fentaNYL (DURAGESIC) 100 MCG/HR Place 1 patch onto the skin every 72 hours. .    Historical Provider, MD   methadone (DOLOPHINE) 10 MG tablet Take 10 mg by mouth every 8 hours as needed for Pain. Peyton Wei Historical Provider, MD   oxybutynin (DITROPAN) 5 MG tablet Take 5 mg by mouth daily     Historical Provider, MD   docusate sodium (COLACE) 100 MG capsule Take 100 mg by mouth 2 times daily    Historical Provider, MD   acetaminophen (TYLENOL) 325 MG tablet Take 650 mg by mouth every 6 hours as needed for Pain    Historical Provider, MD       Current medications:    No current facility-administered medications for this visit. No current outpatient medications on file.      Facility-Administered Medications Ordered in Other Visits   Medication Dose Route Frequency Provider Last Rate Last Dose    piperacillin-tazobactam (ZOSYN) 3.375 g in dextrose 5 % 100 mL IVPB extended infusion (mini-bag)  3.375 g Intravenous Madilyn Pallas, MD   Stopped at 05/01/19 0611    sodium chloride flush 0.9 % injection 10 mL  10 mL Intravenous 2 times per day Nicanor Hardy MD   10 mL at 04/30/19 1030    sodium chloride flush 0.9 % injection 10 mL  10 mL Intravenous PRN Nicanor Hardy MD        magnesium hydroxide (MILK OF MAGNESIA) 400 MG/5ML suspension 30 mL  30 mL Oral Daily PRN Nicanor Hardy MD        ondansetron (ZOFRAN) injection 4 mg  4 mg Intravenous Q6H PRN Nicanor Hardy MD   4 mg at 05/01/19 0542    enoxaparin (LOVENOX) injection 40 mg  40 mg Subcutaneous Daily Nicanor Hardy MD        acetaminophen (TYLENOL) tablet 650 mg  650 mg Oral Q6H PRN Nicanor Hardy MD        amLODIPine (NORVASC) tablet 5 mg  5 mg Oral Daily Nicanor Hardy MD   5 mg at 04/30/19 1029    docusate sodium (COLACE) capsule 100 mg  100 mg Oral BID Nicanor Hardy MD   100 mg at 04/30/19 2029    fentaNYL (DURAGESIC) 100 MCG/HR 1 patch  1 patch Transdermal Q72H Nicanor Hardy MD   1 patch at 04/30/19 1109    gabapentin (NEURONTIN) tablet 600 mg  600 mg Oral TID Nicanor Hardy MD   600 mg at 04/30/19 2028    metoclopramide (REGLAN) tablet 5 mg  5 mg Oral 4x Daily AC & HS Nicanor Hardy MD   Stopped at 05/01/19 0543    opium-belladonna (B&O SUPPRETTES) 16.2-60 MG suppository 60 mg  60 mg Rectal BID PRN Nicanor Hardy MD        oxybutynin (DITROPAN) tablet 5 mg  5 mg Oral Daily Nicanor Hardy MD   5 mg at 04/30/19 1029    sertraline (ZOLOFT) tablet 50 mg  50 mg Oral Daily Nicanor Hardy MD   50 mg at 04/30/19 1029    methadone (DOLOPHINE) tablet 10 mg  10 mg Oral Q8H PRN Nicanor Hardy MD   10 mg at 04/30/19 2032       Allergies:     Allergies   Allergen Reactions    Latex Anaphylaxis     \"throat closes\"    Asa [Aspirin] Anaphylaxis     \"causes throat to close\"    Other Shortness Of Breath     Seafood    Contrast [Barium-Containing Compounds] Nausea And Vomiting Problem List:    Patient Active Problem List   Diagnosis Code    Malignant neoplasm of exocervix ([facility] C53.1    Hematuria R31.9    HTN (hypertension) I10    Acute blood loss anemia D62    Acute renal failure superimposed on stage 4 chronic kidney disease (HCC) N17.9, N18.4    Lower abdominal pain R10.30    Palliative care encounter Z51.5    Cancer associated pain G89.3    Goals of care, counseling/discussion Z71.89    Displaced fracture of right femoral neck ([facility] S72.001A    Moderate protein-calorie malnutrition (HCC) E44.0    UTI (urinary tract infection) N39.0       Past Medical History:        Diagnosis Date    COPD (chronic obstructive pulmonary disease) ([facility]     Hyperlipidemia     Hypertension     PMB (postmenopausal bleeding)     Squamous cell carcinoma 02/06/2017    Invasive non-keratinizing squamous cell carcinoma with extensive squamous cell/severe dysplasia LAY 3       Past Surgical History:        Procedure Laterality Date    APPENDECTOMY      COLONOSCOPY N/A 3/13/2019    COLONOSCOPY WITH BIOPSY performed by Sarai Fishman MD at [address] [place] [address] Rd      HIP FRACTURE SURGERY Right 3/4/2019    HIP OPEN REDUCTION INTERNAL FIXATION performed by Lazara Zepeda MD at [place] [address] Right     r hip surgery    LAPAROSCOPY      MOUTH SURGERY      NEPHROSTOMY Right     OTHER SURGICAL HISTORY  06/27/2017    Tandem and Ovoid insertion     OTHER SURGICAL HISTORY N/A 08/04/2017    TONSILLECTOMY      TONSILLECTOMY      URETER STENT PLACEMENT      kidney stents due to kidney failure       Social History:    Social History     Tobacco Use    Smoking status: Current Every Day Smoker     Packs/day: 0.25     Years: 40.00     Pack years: 10.00     Types: Cigarettes    Smokeless tobacco: Never Used   Substance Use Topics    Alcohol use: No     Comment: 1 x a month                                Ready to quit: Not Answered  Counseling given: Not Answered      Vital Signs (Current): There were no vitals filed for this visit. BP Readings from Last 3 Encounters:   05/01/19 108/66   04/19/19 126/63   03/28/19 139/66       NPO Status:  > 8 hours                                                                               BMI:   Wt Readings from Last 3 Encounters:   05/01/19 96 lb (43.5 kg)   04/19/19 96 lb (43.5 kg)   03/12/19 100 lb (45.4 kg)     There is no height or weight on file to calculate BMI.    CBC:   Lab Results   Component Value Date    WBC 5.6 04/30/2019    RBC 3.64 04/30/2019    HGB 11.4 04/30/2019    HCT 36.1 04/30/2019    MCV 99.2 04/30/2019    RDW 16.1 04/30/2019     04/30/2019       CMP:   Lab Results   Component Value Date     04/30/2019    K 3.8 04/30/2019     04/30/2019    CO2 20 04/30/2019    BUN 16 04/30/2019    CREATININE 1.4 04/30/2019    GFRAA 46 04/30/2019    LABGLOM 38 04/30/2019    GLUCOSE 94 04/30/2019    PROT 6.4 04/29/2019    CALCIUM 8.2 04/30/2019    BILITOT <0.2 04/29/2019    ALKPHOS 65 04/29/2019    AST 8 04/29/2019    ALT <5 04/29/2019       POC Tests: No results for input(s): POCGLU, POCNA, POCK, POCCL, POCBUN, POCHEMO, POCHCT in the last 72 hours.     Coags:   Lab Results   Component Value Date    PROTIME 12.2 04/30/2019    INR 1.1 04/30/2019    APTT 30.8 03/09/2019       HCG (If Applicable): No results found for: PREGTESTUR, PREGSERUM, HCG, HCGQUANT     ABGs: No results found for: PHART, PO2ART, LML2XQU, YKE1CAW, BEART, Q6FPRCKL     Type & Screen (If Applicable):  No results found for: LABABO, [address]    Anesthesia Evaluation  Patient summary reviewed and Nursing notes reviewed no history of anesthetic complications:   Airway: Mallampati: II  TM distance: >3 FB   Neck ROM: full  Mouth opening: > = 3 FB Dental:    (+) upper dentures      Pulmonary:   (+) COPD: no interval change,  decreased breath sounds,  current

## 2019-05-01 NOTE — PROGRESS NOTES
Spoke with Dr. Madhu Suggs and Dr. Erin Sethi. I let Dr. Ellis Aguirre know Dr. Payton Blackmon concerns regarding stent placement during active UTI and risk  pyelonephritis. Ok to place stent despite UTI per Dr. Lore Gar. Dr. Madhu Suggs notified.

## 2019-05-02 ENCOUNTER — APPOINTMENT (OUTPATIENT)
Dept: ULTRASOUND IMAGING | Age: 59
DRG: 689 | End: 2019-05-02
Payer: OTHER GOVERNMENT

## 2019-05-02 ENCOUNTER — APPOINTMENT (OUTPATIENT)
Dept: GENERAL RADIOLOGY | Age: 59
DRG: 689 | End: 2019-05-02
Payer: OTHER GOVERNMENT

## 2019-05-02 LAB
HCT VFR BLD CALC: 29 % (ref 34–48)
HEMOGLOBIN: 9.4 G/DL (ref 11.5–15.5)
MCH RBC QN AUTO: 31.2 PG (ref 26–35)
MCHC RBC AUTO-ENTMCNC: 32.4 % (ref 32–34.5)
MCV RBC AUTO: 96.3 FL (ref 80–99.9)
PDW BLD-RTO: 16.2 FL (ref 11.5–15)
PLATELET # BLD: 270 E9/L (ref 130–450)
PMV BLD AUTO: 9.3 FL (ref 7–12)
RBC # BLD: 3.01 E12/L (ref 3.5–5.5)
WBC # BLD: 6.4 E9/L (ref 4.5–11.5)

## 2019-05-02 PROCEDURE — 74018 RADEX ABDOMEN 1 VIEW: CPT

## 2019-05-02 PROCEDURE — 93971 EXTREMITY STUDY: CPT

## 2019-05-02 PROCEDURE — 2709999900 HC NON-CHARGEABLE SUPPLY

## 2019-05-02 PROCEDURE — 6360000002 HC RX W HCPCS: Performed by: HOSPITALIST

## 2019-05-02 PROCEDURE — 2580000003 HC RX 258: Performed by: HOSPITALIST

## 2019-05-02 PROCEDURE — 2140000000 HC CCU INTERMEDIATE R&B

## 2019-05-02 PROCEDURE — 2580000003 HC RX 258: Performed by: INTERNAL MEDICINE

## 2019-05-02 PROCEDURE — 6370000000 HC RX 637 (ALT 250 FOR IP): Performed by: HOSPITALIST

## 2019-05-02 PROCEDURE — 85027 COMPLETE CBC AUTOMATED: CPT

## 2019-05-02 PROCEDURE — 96523 IRRIG DRUG DELIVERY DEVICE: CPT

## 2019-05-02 PROCEDURE — 36593 DECLOT VASCULAR DEVICE: CPT

## 2019-05-02 PROCEDURE — 6360000002 HC RX W HCPCS: Performed by: INTERNAL MEDICINE

## 2019-05-02 PROCEDURE — 51798 US URINE CAPACITY MEASURE: CPT

## 2019-05-02 PROCEDURE — 6370000000 HC RX 637 (ALT 250 FOR IP): Performed by: FAMILY MEDICINE

## 2019-05-02 PROCEDURE — 36415 COLL VENOUS BLD VENIPUNCTURE: CPT

## 2019-05-02 RX ORDER — GABAPENTIN 300 MG/1
600 CAPSULE ORAL 3 TIMES DAILY
Status: DISCONTINUED | OUTPATIENT
Start: 2019-05-02 | End: 2019-05-06 | Stop reason: HOSPADM

## 2019-05-02 RX ORDER — MORPHINE SULFATE 2 MG/ML
2 INJECTION, SOLUTION INTRAMUSCULAR; INTRAVENOUS EVERY 4 HOURS PRN
Status: DISCONTINUED | OUTPATIENT
Start: 2019-05-02 | End: 2019-05-06 | Stop reason: HOSPADM

## 2019-05-02 RX ADMIN — PIPERACILLIN AND TAZOBACTAM 3.38 G: 3; .375 INJECTION, POWDER, FOR SOLUTION INTRAVENOUS at 05:01

## 2019-05-02 RX ADMIN — PIPERACILLIN AND TAZOBACTAM 3.38 G: 3; .375 INJECTION, POWDER, FOR SOLUTION INTRAVENOUS at 20:13

## 2019-05-02 RX ADMIN — METOCLOPRAMIDE 5 MG: 5 TABLET ORAL at 05:37

## 2019-05-02 RX ADMIN — METOCLOPRAMIDE 5 MG: 5 TABLET ORAL at 20:13

## 2019-05-02 RX ADMIN — SERTRALINE 50 MG: 50 TABLET, FILM COATED ORAL at 09:10

## 2019-05-02 RX ADMIN — MORPHINE SULFATE 2 MG: 2 INJECTION, SOLUTION INTRAMUSCULAR; INTRAVENOUS at 14:52

## 2019-05-02 RX ADMIN — Medication 10 ML: at 09:11

## 2019-05-02 RX ADMIN — HYDROCODONE BITARTRATE AND ACETAMINOPHEN 1 TABLET: 10; 325 TABLET ORAL at 23:51

## 2019-05-02 RX ADMIN — OXYBUTYNIN CHLORIDE 5 MG: 5 TABLET ORAL at 09:10

## 2019-05-02 RX ADMIN — ONDANSETRON HYDROCHLORIDE 4 MG: 2 SOLUTION INTRAMUSCULAR; INTRAVENOUS at 09:21

## 2019-05-02 RX ADMIN — METHADONE HYDROCHLORIDE 10 MG: 10 TABLET ORAL at 09:13

## 2019-05-02 RX ADMIN — Medication 10 ML: at 17:05

## 2019-05-02 RX ADMIN — ATROPA BELLADONNA AND OPIUM 60 MG: 16.2; 6 SUPPOSITORY RECTAL at 20:30

## 2019-05-02 RX ADMIN — Medication 10 ML: at 05:01

## 2019-05-02 RX ADMIN — METOCLOPRAMIDE 5 MG: 5 TABLET ORAL at 11:51

## 2019-05-02 RX ADMIN — ALTEPLASE 4 MG: 2.2 INJECTION, POWDER, LYOPHILIZED, FOR SOLUTION INTRAVENOUS at 10:12

## 2019-05-02 RX ADMIN — HYDROCODONE BITARTRATE AND ACETAMINOPHEN 1 TABLET: 10; 325 TABLET ORAL at 17:52

## 2019-05-02 RX ADMIN — DOCUSATE SODIUM 100 MG: 100 CAPSULE, LIQUID FILLED ORAL at 09:10

## 2019-05-02 RX ADMIN — HYDROCODONE BITARTRATE AND ACETAMINOPHEN 1 TABLET: 10; 325 TABLET ORAL at 11:51

## 2019-05-02 RX ADMIN — Medication 10 ML: at 20:12

## 2019-05-02 RX ADMIN — METOCLOPRAMIDE 5 MG: 5 TABLET ORAL at 17:04

## 2019-05-02 RX ADMIN — GABAPENTIN 600 MG: 300 CAPSULE ORAL at 20:14

## 2019-05-02 RX ADMIN — AMLODIPINE BESYLATE 5 MG: 5 TABLET ORAL at 09:10

## 2019-05-02 RX ADMIN — HYDROCODONE BITARTRATE AND ACETAMINOPHEN 1 TABLET: 10; 325 TABLET ORAL at 05:01

## 2019-05-02 RX ADMIN — PIPERACILLIN AND TAZOBACTAM 3.38 G: 3; .375 INJECTION, POWDER, FOR SOLUTION INTRAVENOUS at 13:25

## 2019-05-02 RX ADMIN — NICOTINE POLACRILEX 4 MG: 2 GUM, CHEWING BUCCAL at 20:13

## 2019-05-02 RX ADMIN — GABAPENTIN 600 MG: 600 TABLET, FILM COATED ORAL at 09:10

## 2019-05-02 RX ADMIN — ONDANSETRON HYDROCHLORIDE 4 MG: 2 SOLUTION INTRAMUSCULAR; INTRAVENOUS at 17:04

## 2019-05-02 RX ADMIN — MORPHINE SULFATE 2 MG: 2 INJECTION, SOLUTION INTRAMUSCULAR; INTRAVENOUS at 20:14

## 2019-05-02 RX ADMIN — METHADONE HYDROCHLORIDE 10 MG: 10 TABLET ORAL at 22:59

## 2019-05-02 RX ADMIN — NICOTINE POLACRILEX 4 MG: 2 GUM, CHEWING BUCCAL at 02:17

## 2019-05-02 RX ADMIN — GABAPENTIN 600 MG: 600 TABLET, FILM COATED ORAL at 13:25

## 2019-05-02 ASSESSMENT — PAIN SCALES - GENERAL
PAINLEVEL_OUTOF10: 6
PAINLEVEL_OUTOF10: 10
PAINLEVEL_OUTOF10: 9
PAINLEVEL_OUTOF10: 9
PAINLEVEL_OUTOF10: 7

## 2019-05-02 ASSESSMENT — PAIN DESCRIPTION - DESCRIPTORS
DESCRIPTORS: CRAMPING;CONSTANT;DISCOMFORT
DESCRIPTORS: ACHING;DISCOMFORT
DESCRIPTORS: SPASM;CRAMPING;ACHING

## 2019-05-02 ASSESSMENT — PAIN DESCRIPTION - LOCATION
LOCATION: ABDOMEN;BACK
LOCATION: ABDOMEN
LOCATION: ABDOMEN;GENERALIZED

## 2019-05-02 ASSESSMENT — PAIN DESCRIPTION - FREQUENCY
FREQUENCY: CONTINUOUS

## 2019-05-02 ASSESSMENT — PAIN DESCRIPTION - PROGRESSION
CLINICAL_PROGRESSION: NOT CHANGED

## 2019-05-02 ASSESSMENT — PAIN DESCRIPTION - ONSET: ONSET: ON-GOING

## 2019-05-02 ASSESSMENT — PAIN - FUNCTIONAL ASSESSMENT: PAIN_FUNCTIONAL_ASSESSMENT: PREVENTS OR INTERFERES SOME ACTIVE ACTIVITIES AND ADLS

## 2019-05-02 ASSESSMENT — PAIN DESCRIPTION - ORIENTATION: ORIENTATION: LOWER

## 2019-05-02 ASSESSMENT — PAIN DESCRIPTION - PAIN TYPE: TYPE: CHRONIC PAIN

## 2019-05-02 NOTE — ANESTHESIA POSTPROCEDURE EVALUATION
Department of Anesthesiology  Postprocedure Note    Patient: Lexy Nayak  MRN: [id number]  YOB: 1960  Date of evaluation: 5/1/2019  Time:  9:23 PM     Procedure Summary     Date:  05/01/19 Room / Location:  [facility]; [facility]    Anesthesia Start:  1010 Anesthesia Stop:  4396    Procedure:  IR URETERAL STENT PLACEMENT THRU EXIST TRACT Diagnosis:  (placement of stent and removal of right nephrostomy tube)    Scheduled Providers:  Gab Radiologist; ELLIOTT Reese - CRNA Responsible Provider:  Mary Valdivia MD    Anesthesia Type:  MAC ASA Status:  3          Anesthesia Type: MAC    Walter Phase I:      Walter Phase II:      Last vitals: Reviewed and per EMR flowsheets.        Anesthesia Post Evaluation    Patient location during evaluation: PACU  Patient participation: complete - patient participated  Level of consciousness: awake  Pain score: 1  Airway patency: patent  Nausea & Vomiting: no vomiting and no nausea  Complications: no  Cardiovascular status: hemodynamically stable  Respiratory status: acceptable  Hydration status: stable

## 2019-05-02 NOTE — PROGRESS NOTES
Port assessed for occlusion. .  Port flushes with no venous return. Partial occlusion. 4 mg activase instilled per order. . Will reassess.

## 2019-05-02 NOTE — PROGRESS NOTES
Pt not in room  Stent apparently placed on right  Will ask for kub to check position  Hopefully can get rght nephrostomy out

## 2019-05-02 NOTE — PROGRESS NOTES
Cath flow removed from R chest port. Blood return noted. 10ml blood wasted. Flushed with 2-10ml saline flushes. Will continue to monitor.

## 2019-05-02 NOTE — PROGRESS NOTES
Dr. Shrestha Proffer notified that pt's left leg swollen including also labia/pelvic area. Also area discolored compared to right leg. Pt c/o pain to left leg, lower pelvis, left knee area.   Orders received

## 2019-05-02 NOTE — PLAN OF CARE
Problem: Falls - Risk of:  Goal: Will remain free from falls  Description  Will remain free from falls  Outcome: Met This Shift     Problem: Pain:  Goal: Control of acute pain  Description  Control of acute pain  Outcome: Met This Shift

## 2019-05-02 NOTE — PROGRESS NOTES
ID Progress Note                [address] [facility], [address] [facility]            Phone [phone]     Fax [phone]      Chief complaint   abdo pain/fever    Subjective:  She claims she did nit pass urine after the surgery and having lower abdo pain  The patient is awake and alert. Tolerating medications. Reports no side effects. Afebrile. 10 ROS otherwise negative unless otherwise specified above. Objective:    Vitals:    05/02/19 0900   BP: 126/60   Pulse: 98   Resp: 18   Temp: 98.4 °F (36.9 °C)   SpO2: 98%     General appearance: Alert, Awake, Oriented times 3, no distress  Skin: Warm and dry  Eyes: Pink palpebral conjunctivae. PERRL  Ears: External ears normal.  Nose/Sinuses: Nares normal. Septum midline. Mucosa normal. No sinus tenderness. Oropharynx: Oropharynx clear with no exudates seen  Neck: Neck supple. No jugular venous distension, lymphadenopathy or thyromegaly Trachea midline  Lungs: Lungs clear to auscultation bilaterally. No rhonchi, crackles or wheezes  Heart: S1 S2  Regular rate and rhythm. No rub, murmur or gallop  Abdomen: Abdomen soft, non-tender. BS normal. No masses, organomegaly  Extremities: No edema, Peripheral pulses palpable  Musculoskeletal: Muscular strength appears intact.  No joint effusion, tenderness, swelling or warmth        Labs:  Recent Labs     04/29/19  1904 04/30/19  0705 05/02/19  0630   WBC 5.6 5.6 6.4   RBC 3.56 3.64 3.01*   HGB 10.7* 11.4* 9.4*   HCT 34.1 36.1 29.0*   MCV 95.8 99.2 96.3   MCH 30.1 31.3 31.2   MCHC 31.4* 31.6* 32.4   RDW 16.1* 16.1* 16.2*    304 270   MPV 9.2 8.6 9.3     CMP:    Lab Results   Component Value Date     04/30/2019    K 3.8 04/30/2019     04/30/2019    CO2 20 04/30/2019    BUN 16 04/30/2019    CREATININE 1.4 04/30/2019    GFRAA 46 04/30/2019    LABGLOM 38 04/30/2019    GLUCOSE 94 04/30/2019    PROT 6.4 04/29/2019    LABALBU 3.3 04/29/2019    CALCIUM 8.2 04/30/2019    BILITOT <0.2 04/29/2019    ALKPHOS 65 04/29/2019    AST 8 04/29/2019    ALT <5 04/29/2019          Microbiology :  Recent Labs     04/30/19  0155   BC 24 Hours- no growth     No results for input(s): BLOODCULT2 in the last 72 hours. Recent Labs     04/29/19  1904   LABURIN ,000 CFU/mL  Mixed marnie isolated. Further workup and sensitivity testing  is not routinely indicated and will not be performed. Mixed marnie isolated includes:  Mixed gram negative rods  Gram positive organism       No results for input(s): CULTRESP in the last 72 hours. No results for input(s): WNDABS in the last 72 hours. Radiology :  US DUP LOWER EXTREMITY LEFT JANE   Final Result   No evidence of left lower extremity deep venous thrombosis. CT ABDOMEN PELVIS WO CONTRAST   Final Result      1. Left ureteric catheters are in proper position, draining the left   kidney. 2. Catheter draining the left kidney in proper position. 3. Some degree of anasarca and discrete ascites. 4. Pattern of constipation. 5. Bilateral adrenal mass which can further increased size since   previous examination of December 2018. Metastasis to be considered. Please correlate clinically. 6. Midline retroperitoneal adenopathy. IR GUIDED URETERAL STENT PLACE THRU EXIST TRACT    (Results Pending)   XR ABDOMEN (KUB) (SINGLE AP VIEW)    (Results Pending)         URINARY CATHETER OUTPUT (Estrella):  [REMOVED] Nephrostomy Right-Output (mL): 200 mL  Nephrostomy 1 Right 8 fr-Output (mL): 200 mL    Assessment and Plan:         1.  Complicated UTI (UA from clean catch, not nephrostomy bag)  2 history of cervical cancer status post chemo and radiation therapy  3 history of bilateral urinary stents/right-sided nephrostomy tube in place  4 status post IR guided right stent exchange on 5/1/2019     Plan  - bladder scan, concern for urinary retention  - Continue with Zosyn for now  - Follow urine cultures  - blood cultures negative so

## 2019-05-02 NOTE — PROGRESS NOTES
Hospitalist Progress Note      PCP: Andrea English MD    Date of Admission: 4/29/2019  Days in the hospital: 3    Chief Complaint: Abdominal pain    Hospital Course:     >> admitted for abdominal pain / R nephrostomy tube dislogement  >> Uro / ID consultation - IR to place stent and remove NT    5/2/2019: Palliative care consulted. Continued on Zosyn. Bladder scan ordered. KUB performed. Status post right urinary stent exchange on 5/1/2019    Subjective  Patient seen and examined at bedside. Patient states that she is still having a lot of pain. Having some nausea and vomiting today as well. Patient denies any fevers, chills, chest pain, shortness of breath. Medications:  Reviewed    Infusion Medications   Scheduled Medications    heparin flush  100 Units Intracatheter Daily    piperacillin-tazobactam  3.375 g Intravenous Q8H    sodium chloride flush  10 mL Intravenous 2 times per day    enoxaparin  40 mg Subcutaneous Daily    amLODIPine  5 mg Oral Daily    docusate sodium  100 mg Oral BID    fentaNYL  1 patch Transdermal Q72H    gabapentin  600 mg Oral TID    metoclopramide  5 mg Oral 4x Daily AC & HS    oxybutynin  5 mg Oral Daily    sertraline  50 mg Oral Daily     PRN Meds: morphine, HYDROcodone-acetaminophen, nicotine polacrilex, heparin flush, sodium chloride flush, magnesium hydroxide, ondansetron, acetaminophen, opium-belladonna, methadone      Intake/Output Summary (Last 24 hours) at 5/2/2019 1757  Last data filed at 5/2/2019 1400  Gross per 24 hour   Intake 1400 ml   Output 1250 ml   Net 150 ml       Exam:    /66   Pulse 95   Temp 99 °F (37.2 °C) (Temporal)   Resp 18   Ht 5' 3\" (1.6 m)   Wt 99 lb (44.9 kg)   SpO2 95%   BMI 17.54 kg/m²     General appearance: No apparent distress, Chronically ill appearing. HEENT: Pupils equal, round, and reactive to light. Conjunctivae/corneas clear. Neck: Supple. No jugular venous distention. Trachea midline.   Respiratory:  Normal respiratory effort. Clear to auscultation, bilaterally without Rales/Wheezes/Rhonchi. Cardiovascular: Regular rate and rhythm with normal S1/S2 without murmurs, rubs or gallops. Abdomen: Soft, non-tender, non-distended with normal bowel sounds. Musculoskeletal: No clubbing, cyanosis or edema bilaterally. Brisk capillary refill. 2+ lower extremity pulses (dorsalis pedis). Skin:  No rashes    Neurologic: awake, alert and following commands     Labs:   Recent Labs     04/29/19  1904 04/30/19  0705 05/02/19  0630   WBC 5.6 5.6 6.4   HGB 10.7* 11.4* 9.4*   HCT 34.1 36.1 29.0*    304 270     Recent Labs     04/29/19  1904 04/30/19  0705    140   K 3.9 3.8    106   CO2 21* 20*   BUN 18 16   CREATININE 1.3* 1.4*   CALCIUM 8.3* 8.2*     Recent Labs     04/29/19  1904   AST 8   ALT <5   BILITOT <0.2   ALKPHOS 65     Recent Labs     04/30/19  1315   INR 1.1     No results for input(s): Cindy Bella in the last 72 hours. Imaging:  XR ABDOMEN (KUB) (SINGLE AP VIEW)   Final Result      NON-SPECIFIC GAS PATTERN      There is bilateral ureteral stents in place as described above. US DUP LOWER EXTREMITY LEFT JANE   Final Result   No evidence of left lower extremity deep venous thrombosis. CT ABDOMEN PELVIS WO CONTRAST   Final Result      1. Left ureteric catheters are in proper position, draining the left   kidney. 2. Catheter draining the left kidney in proper position. 3. Some degree of anasarca and discrete ascites. 4. Pattern of constipation. 5. Bilateral adrenal mass which can further increased size since   previous examination of December 2018. Metastasis to be considered. Please correlate clinically. 6. Midline retroperitoneal adenopathy.             IR GUIDED URETERAL STENT PLACE THRU EXIST TRACT    (Results Pending)         Assessment/Plan:  Active Hospital Problems    Diagnosis Date Noted    UTI (urinary tract infection) [N39.0] 04/29/2019 Acute Complicated cystitis  Right nephrostomy tube dislogement  Advanced cervical cancer with metastatic disease  COPD - stable  Hypertension - stable  Hyperlipidemia - on statin  CKD 3  Lactic acodosis - RESOLVED  Chronic anemia - stable  Chronic opioid use    Plan:  · Continue with Zosyn  · Palliative care consulted  · Await further recommendations from consultants    · Body mass index is 17.54 kg/m². · DVT Prophylaxis  · Lovenox    · Diet  · DIET GENERAL;    · Code Status  · Full Code    · PT/OT Eval Status  · Consulted     · Disposition  · Likely to go home at discharge          [address] [employer] - Chief Hospitalist  Please contact me through perfect serve    NOTE: This report was transcribed using voice recognition software. Every effort was made to ensure accuracy; however, inadvertent computerized transcription errors may be present.

## 2019-05-02 NOTE — PROGRESS NOTES
Messaged [facility] regarding patient new sinus tachycardia, and having bouts of emesis this afternoon/evening, zofran administered. Also requested cathflo be ordered for mediport not drawing blood.

## 2019-05-03 LAB
HCT VFR BLD CALC: 27.7 % (ref 34–48)
HEMOGLOBIN: 8.8 G/DL (ref 11.5–15.5)
MCH RBC QN AUTO: 30.9 PG (ref 26–35)
MCHC RBC AUTO-ENTMCNC: 31.8 % (ref 32–34.5)
MCV RBC AUTO: 97.2 FL (ref 80–99.9)
PDW BLD-RTO: 16 FL (ref 11.5–15)
PLATELET # BLD: 270 E9/L (ref 130–450)
PMV BLD AUTO: 9.2 FL (ref 7–12)
RBC # BLD: 2.85 E12/L (ref 3.5–5.5)
WBC # BLD: 6.4 E9/L (ref 4.5–11.5)

## 2019-05-03 PROCEDURE — 1200000000 HC SEMI PRIVATE

## 2019-05-03 PROCEDURE — 2580000003 HC RX 258: Performed by: HOSPITALIST

## 2019-05-03 PROCEDURE — 6360000002 HC RX W HCPCS: Performed by: INTERNAL MEDICINE

## 2019-05-03 PROCEDURE — 97530 THERAPEUTIC ACTIVITIES: CPT

## 2019-05-03 PROCEDURE — 6370000000 HC RX 637 (ALT 250 FOR IP): Performed by: HOSPITALIST

## 2019-05-03 PROCEDURE — 6360000002 HC RX W HCPCS: Performed by: HOSPITALIST

## 2019-05-03 PROCEDURE — 97535 SELF CARE MNGMENT TRAINING: CPT

## 2019-05-03 PROCEDURE — 85027 COMPLETE CBC AUTOMATED: CPT

## 2019-05-03 PROCEDURE — 6360000002 HC RX W HCPCS: Performed by: CLINICAL NURSE SPECIALIST

## 2019-05-03 PROCEDURE — 2580000003 HC RX 258: Performed by: INTERNAL MEDICINE

## 2019-05-03 PROCEDURE — 36415 COLL VENOUS BLD VENIPUNCTURE: CPT

## 2019-05-03 PROCEDURE — 99223 1ST HOSP IP/OBS HIGH 75: CPT | Performed by: CLINICAL NURSE SPECIALIST

## 2019-05-03 PROCEDURE — 97165 OT EVAL LOW COMPLEX 30 MIN: CPT

## 2019-05-03 PROCEDURE — 6370000000 HC RX 637 (ALT 250 FOR IP): Performed by: FAMILY MEDICINE

## 2019-05-03 PROCEDURE — 6360000002 HC RX W HCPCS: Performed by: FAMILY MEDICINE

## 2019-05-03 PROCEDURE — 94640 AIRWAY INHALATION TREATMENT: CPT

## 2019-05-03 PROCEDURE — 6370000000 HC RX 637 (ALT 250 FOR IP): Performed by: CLINICAL NURSE SPECIALIST

## 2019-05-03 RX ORDER — SODIUM CHLORIDE 0.9 % (FLUSH) 0.9 %
10 SYRINGE (ML) INJECTION PRN
Status: DISCONTINUED | OUTPATIENT
Start: 2019-05-03 | End: 2019-05-06 | Stop reason: HOSPADM

## 2019-05-03 RX ORDER — ALBUTEROL SULFATE 2.5 MG/3ML
2.5 SOLUTION RESPIRATORY (INHALATION) EVERY 6 HOURS PRN
Status: DISCONTINUED | OUTPATIENT
Start: 2019-05-03 | End: 2019-05-06 | Stop reason: HOSPADM

## 2019-05-03 RX ORDER — HYDROCODONE BITARTRATE AND ACETAMINOPHEN 10; 325 MG/1; MG/1
1 TABLET ORAL EVERY 4 HOURS PRN
Status: DISCONTINUED | OUTPATIENT
Start: 2019-05-03 | End: 2019-05-06 | Stop reason: HOSPADM

## 2019-05-03 RX ORDER — ACETAMINOPHEN 325 MG/1
650 TABLET ORAL EVERY 6 HOURS PRN
Status: DISCONTINUED | OUTPATIENT
Start: 2019-05-03 | End: 2019-05-06 | Stop reason: HOSPADM

## 2019-05-03 RX ADMIN — ONDANSETRON HYDROCHLORIDE 4 MG: 2 SOLUTION INTRAMUSCULAR; INTRAVENOUS at 21:42

## 2019-05-03 RX ADMIN — SERTRALINE 50 MG: 50 TABLET, FILM COATED ORAL at 08:10

## 2019-05-03 RX ADMIN — PIPERACILLIN AND TAZOBACTAM 3.38 G: 3; .375 INJECTION, POWDER, FOR SOLUTION INTRAVENOUS at 04:15

## 2019-05-03 RX ADMIN — METOCLOPRAMIDE 5 MG: 5 TABLET ORAL at 17:02

## 2019-05-03 RX ADMIN — Medication 10 ML: at 21:05

## 2019-05-03 RX ADMIN — METOCLOPRAMIDE 5 MG: 5 TABLET ORAL at 12:37

## 2019-05-03 RX ADMIN — ENOXAPARIN SODIUM 40 MG: 40 INJECTION SUBCUTANEOUS at 08:11

## 2019-05-03 RX ADMIN — HYDROCODONE BITARTRATE AND ACETAMINOPHEN 1 TABLET: 10; 325 TABLET ORAL at 12:37

## 2019-05-03 RX ADMIN — GABAPENTIN 600 MG: 300 CAPSULE ORAL at 08:11

## 2019-05-03 RX ADMIN — HEPARIN 100 UNITS: 100 SYRINGE at 08:11

## 2019-05-03 RX ADMIN — HYDROCODONE BITARTRATE AND ACETAMINOPHEN 1 TABLET: 10; 325 TABLET ORAL at 17:18

## 2019-05-03 RX ADMIN — METOCLOPRAMIDE 5 MG: 5 TABLET ORAL at 21:05

## 2019-05-03 RX ADMIN — GABAPENTIN 600 MG: 300 CAPSULE ORAL at 21:05

## 2019-05-03 RX ADMIN — OXYBUTYNIN CHLORIDE 5 MG: 5 TABLET ORAL at 08:11

## 2019-05-03 RX ADMIN — HYDROCODONE BITARTRATE AND ACETAMINOPHEN 1 TABLET: 10; 325 TABLET ORAL at 21:42

## 2019-05-03 RX ADMIN — PIPERACILLIN AND TAZOBACTAM 3.38 G: 3; .375 INJECTION, POWDER, FOR SOLUTION INTRAVENOUS at 12:38

## 2019-05-03 RX ADMIN — ALBUTEROL SULFATE 2.5 MG: 2.5 SOLUTION RESPIRATORY (INHALATION) at 21:23

## 2019-05-03 RX ADMIN — Medication 10 ML: at 08:12

## 2019-05-03 RX ADMIN — MORPHINE SULFATE 2 MG: 2 INJECTION, SOLUTION INTRAMUSCULAR; INTRAVENOUS at 20:16

## 2019-05-03 RX ADMIN — GABAPENTIN 600 MG: 300 CAPSULE ORAL at 15:51

## 2019-05-03 RX ADMIN — MORPHINE SULFATE 2 MG: 2 INJECTION, SOLUTION INTRAMUSCULAR; INTRAVENOUS at 04:14

## 2019-05-03 RX ADMIN — METHADONE HYDROCHLORIDE 10 MG: 10 TABLET ORAL at 08:10

## 2019-05-03 RX ADMIN — Medication 10 ML: at 04:15

## 2019-05-03 RX ADMIN — METOCLOPRAMIDE 5 MG: 5 TABLET ORAL at 06:23

## 2019-05-03 RX ADMIN — PIPERACILLIN AND TAZOBACTAM 3.38 G: 3; .375 INJECTION, POWDER, FOR SOLUTION INTRAVENOUS at 21:05

## 2019-05-03 RX ADMIN — AMLODIPINE BESYLATE 5 MG: 5 TABLET ORAL at 08:10

## 2019-05-03 RX ADMIN — HYDROCODONE BITARTRATE AND ACETAMINOPHEN 1 TABLET: 10; 325 TABLET ORAL at 06:25

## 2019-05-03 RX ADMIN — DOCUSATE SODIUM 100 MG: 100 CAPSULE, LIQUID FILLED ORAL at 21:05

## 2019-05-03 ASSESSMENT — PAIN DESCRIPTION - DESCRIPTORS
DESCRIPTORS: DISCOMFORT;SORE;ACHING
DESCRIPTORS: DISCOMFORT;ACHING;SORE
DESCRIPTORS: DISCOMFORT;ACHING;SORE
DESCRIPTORS: ACHING;CRAMPING;SORE
DESCRIPTORS: DISCOMFORT;POUNDING

## 2019-05-03 ASSESSMENT — PAIN DESCRIPTION - ORIENTATION
ORIENTATION: LEFT;MID;LOWER
ORIENTATION: LEFT;LOWER;MID
ORIENTATION: LEFT;LOWER;MID
ORIENTATION: LEFT;MID;LOWER

## 2019-05-03 ASSESSMENT — PAIN DESCRIPTION - ONSET
ONSET: ON-GOING

## 2019-05-03 ASSESSMENT — PAIN SCALES - GENERAL
PAINLEVEL_OUTOF10: 10
PAINLEVEL_OUTOF10: 10
PAINLEVEL_OUTOF10: 5
PAINLEVEL_OUTOF10: 10
PAINLEVEL_OUTOF10: 10
PAINLEVEL_OUTOF10: 0
PAINLEVEL_OUTOF10: 9
PAINLEVEL_OUTOF10: 8
PAINLEVEL_OUTOF10: 10
PAINLEVEL_OUTOF10: 0

## 2019-05-03 ASSESSMENT — PAIN DESCRIPTION - DIRECTION
RADIATING_TOWARDS: DOWN LEFT LEG
RADIATING_TOWARDS: DOWN LT LEG
RADIATING_TOWARDS: DOWN LT LEG

## 2019-05-03 ASSESSMENT — PAIN DESCRIPTION - PROGRESSION
CLINICAL_PROGRESSION: NOT CHANGED

## 2019-05-03 ASSESSMENT — PAIN DESCRIPTION - PAIN TYPE
TYPE: ACUTE PAIN
TYPE: CHRONIC PAIN;ACUTE PAIN
TYPE: ACUTE PAIN

## 2019-05-03 ASSESSMENT — PAIN - FUNCTIONAL ASSESSMENT
PAIN_FUNCTIONAL_ASSESSMENT: PREVENTS OR INTERFERES SOME ACTIVE ACTIVITIES AND ADLS

## 2019-05-03 ASSESSMENT — PAIN DESCRIPTION - LOCATION
LOCATION: ABDOMEN;BACK;LEG
LOCATION: ABDOMEN;GENERALIZED

## 2019-05-03 ASSESSMENT — PAIN DESCRIPTION - FREQUENCY
FREQUENCY: CONTINUOUS

## 2019-05-03 NOTE — PLAN OF CARE
Problem: Falls - Risk of:  Goal: Will remain free from falls  Description  Will remain free from falls  Outcome: Met This Shift     Problem: Musculor/Skeletal Functional Status  Goal: Highest potential functional level  Outcome: Ongoing     Problem: Pain:  Goal: Control of acute pain  Description  Control of acute pain  Outcome: Ongoing

## 2019-05-03 NOTE — PROGRESS NOTES
Occupational Therapy  OCCUPATIONAL THERAPY INITIAL EVALUATION      Date:5/3/2019  Patient Name: Shanika Chavez  MRN: [id number]  : 1960  Room: [address] [place] [address]A        Evaluating OT: Edgar Vikasrhys OTR/L [id number]    AM-PAC Daily Activity Raw Score:     Recommended Adaptive Equipment: TBD     DIAGNOSIS: UTI  PMHx: COPD, HLD, HTN, Squamous cell carcinoma  PRECAUTIONS: Falls, Neutropenic     Home Living: Pt lives with grandson (13years old), 2 story with 1st floor setup (bed in dining room),  Full bath with tub/shower unit (seat and grab bars). 4-5 steps/rail to enter    Equipment owned: soc aid - uses occasionally (when having back pain)  Prior Level of Function: Independent  with ADLs , grandson assists occasionally  with IADLs; ambulated rollator     Pain Level: no pain   Cognition:  Ox3, alert and conversing     Judgement/safety:  Fair      Functional Assessment:   Initial Eval Status  Date: 5/3/19   Feeding Independent    Grooming Independent   Standing sink    UB Dressing Independent    LB Dressing Mod I    Bathing Mod I    Toileting Independent    Bed Mobility  Sitting in chair    Functional Transfers Mod I   Sit-stand from chair    Functional Mobility Mod I ,w/walker to/from  bathroom    Balance Sitting:     Static:  Independent     Standing: Mod I    Activity Tolerance Fair+   Visual/  Perceptual Glasses: reading                Strength ROM Additional Info:    RUE  3+/5  WFL good  and wfl FMC/dexterity noted during ADL tasks       LUE 3+/5  WFL good  and wfl FMC/dexterity noted during ADL tasks         Hearing: WFL   Sensation:  No c/o numbness or tingling                             Comments/Treatment: Patient ambulated to bathroom, w/walker - completed ADL tasks. Standing at sink grooming tasks (brushing hair and teeth, washing hands and face). Sponge bathing standing at sink level (washing hair and upper body) Mod I. Commode xfer Mod I, Independent with toileting. Able to manage socks.

## 2019-05-03 NOTE — PROGRESS NOTES
Nutrition Assessment    Type and Reason for Visit: Reassess    Nutrition Recommendations: Continue current diet, Start ONS,Magic cup BID    Nutrition Assessment: No change in nutritional status, po intake 25-50%,weight loss > 7.5% in 3 months, severe muscle and fat loss, catabolic illness of ovarian CA wit radiation and chemotherapy with neprostomy infection. Willing to take ONS BID. Malnutrition Assessment:  · Malnutrition Status: Meets the criteria for severe malnutrition  · Context: Chronic illness  · Findings of the 6 clinical characteristics of malnutrition (Minimum of 2 out of 6 clinical characteristics is required to make the diagnosis of moderate or severe Protein Calorie Malnutrition based on AND/ASPEN Guidelines):  1. Energy Intake-Less than or equal to 50% of estimated energy requirement, Greater than or equal to 3 months    2. Weight Loss-10% loss or greater, ([id number])  3. Fat Loss-Severe subcutaneous fat loss, Orbital, Triceps  4. Muscle Loss-Severe muscle mass loss, Calf (gastrocnemius), Thigh (quadriceps), Clavicles (pectoralis and deltoids), Temples (temporalis muscle)  5. Fluid Accumulation-Mild fluid accumulation, Extremities  6.  Strength-Not measured    Nutrition Risk Level: High    Nutrient Needs:  · Estimated Daily Total Kcal: 1400-1500kcal(30kcal/kg +250kcal for wt gain)  · Estimated Daily Protein (g): 60-75gm(1.5-1.8gm/kg CBW)  · Estimated Daily Total Fluid (ml/day): 5430-8613(ml/kcal)    Nutrition Diagnosis:   · Problem:  In context of chronic illness, Severe malnutrition  · Etiology: related to Catabolic illness     Signs and symptoms:  as evidenced by Intake 25-50%, Diet history of poor intake, Weight loss, Weight loss greater than or equal to 7.5% in 3 months, Severe muscle loss, Severe loss of subcutaneous fat    Objective Information:  · Nutrition-Focused Physical Findings: alert, dentures,Abdomen WDL, + BS, + 2 edema, weakness,  muscle and fat loss,  catabolic illness, + I/O  · Wound Type: None  · Current Nutrition Therapies:  · Oral Diet Orders: General   · Oral Diet intake: 26-50%  · Oral Nutrition Supplement (ONS) Orders: Frozen Oral Supplement(BID)  · ONS intake: Unable to assess  · Anthropometric Measures:  · Ht: 5' 3\" (160 cm)   · Current Body Wt: 98 lb (44.5 kg)(5/3 standing scale)  · Admission Body Wt: 90 lb (40.8 kg)(4/30 Actual per EMR)  · Usual Body Wt: 110 lb (49.9 kg)(11/2018 standing scale )  · % Weight Change:  ,  20lbs wt loss (~18%) x 4 months  · Ideal Body Wt: 115 lb (52.2 kg), % Ideal Body 85%  · BMI Classification: BMI <18.5 Underweight    Nutrition Interventions:   Continue current diet, Start ONS(Magic cup BID)  Continued Inpatient Monitoring, Education Not Indicated, Coordination of Care    Nutrition Evaluation:   · Evaluation: Progressing toward goals   · Goals: Consume >50-75% of meals and ONS    · Monitoring: Meal Intake, Supplement Intake, Diet Tolerance, I&O, Weight, Pertinent Labs, Chewing/Swallowing, Monitor Bowel Function      Electronically signed by Marilin Woodard RD, ASUNCION on 5/3/19 at 2:53 PM    Contact Number:  Ext [phone]

## 2019-05-03 NOTE — PROGRESS NOTES
5/2/2019 2:15 PM   Exam: XR ABDOMEN (KUB) (SINGLE AP VIEW)   Number of Images: 2 views   Indication:   stent position    stent position    Comparison: None.       Findings: The bowel gas pattern is normal. There is a right-sided Universal   stent with the proximal loop in the right renal pelvis and distal loop   is in the bladder. There is a left-sided internal ureteral stent with   the proximal loop in the left renal pelvis and distal loop in the   urinary bladder. No evidence of organomegaly or abnormal   calcifications seen. The osseous structures of the abdomen and pelvis   appear to be normal. 3 fixation screws seen in the right hip which was   seen before and appears to be unchanged.           Impression       NON-SPECIFIC GAS PATTERN       There is bilateral ureteral stents in place as described above. 5/1/2019  9:04 AM - Derick, y Incoming Results From [facility]     Specimen Information: Urine, clean catch        Component Collected Lab   Urine Culture, Routine 04/29/2019  7:04 PM [facility]   ,000 CFU/mL   Mixed marnie isolated. Further workup and sensitivity testing   is not routinely indicated and will not be performed. Mixed marnie isolated includes:   Mixed gram negative rods   Gram positive organism      5/1/2019 11:35 AM - Derick, y Incoming Results From [facility]     Specimen Information: Blood        Component Collected Lab   Blood Culture, Routine 04/30/2019  1:55 AM [facility]   24 Hours- no growth          Assessment/Plan:  Right nephrostomy tube clamped today after stent placement by IR yesterday, left stent, bilateral hydronephrosis  Advanced cervical cancer  UTI  Bilateral adrenal mass   ID and oncology have been consulted  Creatinine stable. Monitor creatinine  Will need nephrostomy tube removed if tolerates clamping  hopefully Monday.          Shannan Keen, APRN - CNP   [facility]  Urology

## 2019-05-03 NOTE — PROGRESS NOTES
ID Progress Note                [address], [place], [address] [facility]            Phone [phone]     Fax [phone]      Chief complaint   abdo pain/fever    Subjective:  She claims she did nit pass urine after the surgery and having lower abdo pain  The patient is awake and alert. Tolerating medications. Reports no side effects. Afebrile. 10 ROS otherwise negative unless otherwise specified above. Objective:    Vitals:    05/03/19 0751   BP: (!) 113/56   Pulse: 116   Resp: 18   Temp: 97.8 °F (36.6 °C)   SpO2: 94%     General appearance: Alert, Awake, Oriented times 3, no distress  Skin: Warm and dry  Eyes: Pink palpebral conjunctivae. PERRL  Ears: External ears normal.  Nose/Sinuses: Nares normal. Septum midline. Mucosa normal. No sinus tenderness. Oropharynx: Oropharynx clear with no exudates seen  Neck: Neck supple. No jugular venous distension, lymphadenopathy or thyromegaly Trachea midline  Lungs: Lungs clear to auscultation bilaterally. No rhonchi, crackles or wheezes  Heart: S1 S2  Regular rate and rhythm. No rub, murmur or gallop  Abdomen: Abdomen soft, non-tender. BS normal. No masses, organomegaly  Extremities: No edema, Peripheral pulses palpable  Musculoskeletal: Muscular strength appears intact.  No joint effusion, tenderness, swelling or warmth        Labs:  Recent Labs     05/02/19  0630 05/03/19  0350   WBC 6.4 6.4   RBC 3.01* 2.85*   HGB 9.4* 8.8*   HCT 29.0* 27.7*   MCV 96.3 97.2   MCH 31.2 30.9   MCHC 32.4 31.8*   RDW 16.2* 16.0*    270   MPV 9.3 9.2     CMP:    Lab Results   Component Value Date     04/30/2019    K 3.8 04/30/2019     04/30/2019    CO2 20 04/30/2019    BUN 16 04/30/2019    CREATININE 1.4 04/30/2019    GFRAA 46 04/30/2019    LABGLOM 38 04/30/2019    GLUCOSE 94 04/30/2019    PROT 6.4 04/29/2019    LABALBU 3.3 04/29/2019    CALCIUM 8.2 04/30/2019    BILITOT <0.2 04/29/2019    ALKPHOS 65 04/29/2019    AST 8 04/29/2019    ALT <5 04/29/2019          Microbiology :  No results for input(s): BC in the last 72 hours. No results for input(s): Renee Haste in the last 72 hours. No results for input(s): LABURIN in the last 72 hours. No results for input(s): CULTRESP in the last 72 hours. No results for input(s): WNDABS in the last 72 hours. Radiology :  XR ABDOMEN (KUB) (SINGLE AP VIEW)   Final Result      NON-SPECIFIC GAS PATTERN      There is bilateral ureteral stents in place as described above. US DUP LOWER EXTREMITY LEFT JANE   Final Result   No evidence of left lower extremity deep venous thrombosis. CT ABDOMEN PELVIS WO CONTRAST   Final Result      1. Left ureteric catheters are in proper position, draining the left   kidney. 2. Catheter draining the left kidney in proper position. 3. Some degree of anasarca and discrete ascites. 4. Pattern of constipation. 5. Bilateral adrenal mass which can further increased size since   previous examination of December 2018. Metastasis to be considered. Please correlate clinically. 6. Midline retroperitoneal adenopathy. IR GUIDED URETERAL STENT PLACE THRU EXIST TRACT    (Results Pending)   IR INTERVENTIONAL RADIOLOGY PROCEDURE REQUEST    (Results Pending)         URINARY CATHETER OUTPUT (Estrella):  [REMOVED] Nephrostomy Right-Output (mL): 200 mL  Nephrostomy 1 Right 8 fr-Output (mL): 100 mL    Assessment and Plan:         1.  Complicated UTI (UA from clean catch, not nephrostomy bag)  2 history of cervical cancer status post chemo and radiation therapy  3 history of bilateral urinary stents/right-sided nephrostomy tube in place  4 status post IR guided right stent exchange on 5/1/2019     Plan  - Continue with Zosyn for now  - Follow urine cultures  - blood cultures negative so far            Electronically signed by Danny Unger MD on 5/3/2019 at 2:39 PM

## 2019-05-03 NOTE — PROGRESS NOTES
Palliative Medicine Social Work     Patient Name: Ruthy Metz  Age: 59 y.o. [place] Status: Pt has [employer] through her  . Next of Kin: Son Rachel Art [phone]. Additional Support: Pt states that her grandchildren are helpful (17 year old grandson - lives with her, 6year old grandson lives with another family member, 9year old granddaughter - lives with her). Minor Children: See above. Pt states that her son may be taking over custody of grandchildren through [employer] - pt has mixed emotions about this. Advanced Directives: Pt does not have in place, will discuss further with pt as she is able. Confirm Code Status: Full    Current Goals of Care: live longer, improve or maintain function/ quality of life, remain at home and continue current management    Mental Health History: None known. Substance Abuse: Pt smokes. Indications of Abuse/Neglect: None known. Financial Concerns: Yes, pt states that she is having difficulty with expenses and food stamps were recently cut despite no change in income. Living Situation: Pt lives at home with her grandchildren (13 and 9). Son lives close by. Pt states that she has repairs needed to a bathroom in her home and ceiling in her kitchen but has been unable to afford them. Physical Care Needs Met: Pt states she manages ok at home. Emotional Needs Met: time spent exploring pts thoughts and feelings, providing support through active listening and validation of feelings. Future Care Needs/Goals/Anticipatory Guidance: Support, advance directives    Referral Needs: [facility] on Aging    Assessment: LSW met with pt at bedside. Pt alert and oriented. Discussed pt's current needs. Pt has custody of 13year old grandson and 9year old granddaughter. She is tearful, stating she knows her health is worsening but is emotional about them continuing to stay with her as long as possible.   Pt discussed that they

## 2019-05-03 NOTE — CARE COORDINATION
SOCIAL WORK/CASEMANAGEMENT TRANSITION OF CARE PLANNING: pt wants a scooter and ramp. She has [facility] but [facility]'t [facility] to [facility] thru the [facility]. I called [facility] and gave them the referral and they will call pt on Monday to set up a appt to come to home for a assessment. Pt notified. Pt is active with [facility] and is for tentative discharge today. They have davonte orders from yesterday.  Mars Ken  5/3/2019

## 2019-05-03 NOTE — PROGRESS NOTES
Physical Therapy  [place]  Daily [facility] Note  NAME: Liam Santamaria  : 1960  MRN: [id number]    Date of Service: 5/3/2019  Evaluating Therapist: Kendall Kahn PT, DPT     Room #: 4184/0235-N  DIAGNOSIS: UTI  PRECAUTIONS: Falls, Neutropenic  PMHx: COPD, HLD, HTN, Squamous cell carcinoma   PROCEDURES: NA     Social:  Pt lives with grandson (14yo) and great grandchild in a 2 floor plan, 1st floor setup with 4 step(s) and 1 rail(s) to enter. Prior to admission pt walked with rollator and was Independent.                Initial Evaluation  Date: 19 Treatment  Date:    5/3 Short Term/ Long Term   Goals   AM-PAC 6 Clicks 50/  35/85     Does pt have pain? Pt c/o overall pain - chronic  chronic pain     Bed Mobility  Rolling: NT  Supine to sit: Mod Independent  Sit to supine: NT  Scooting: Independent  mod I Independent   Transfers Sit to stand: SBA  Stand to sit: SBA  Stand pivot: SBA with [employer]  mod I Mod Independent with [employer]   Ambulation   100 feet x 2 reps with SBA with [employer]  [id number] with ww supervision >400 feet with Mod Independent with [employer]   Stair negotiation: ascended and descended NT  NT;line is too short >4 steps with 1 rail with Mod Independent   BLE ROM WNL       BLE strength Grossly 4/5   Increase by 1/3 MMT grade   Balance Sitting: Independent  Standing: SBA with [employer]   Sitting: NA  Standing: Mod Independent with [employer]        Patient education  Pt was educated on importance of OOB activity    Patient response to education:   Pt verbalized understanding Pt demonstrated skill Pt requires further education in this area   x x x     Additional Comments: Pt supine upon entering room. Pt demo steady gait with ww to use restroom. Pt changed pants/gown/socks before amb in hallway. Pt impulsive at times. Multiple standing rest breaks d/t fatigue. Returned pt to bedside chair and left with all needs met. Pt call light left by patient.     Time in: 0806  Time out: 0830    Pt is making good progress toward established Physical Therapy goals. Continue with physical therapy current plan of care.     Jaime Amaya [facility]  License Number: [id number]

## 2019-05-03 NOTE — CONSULTS
Palliative Medicine   Consult Note    Provider: Cameron Hawk RN, CNS, [facility] Day: 5    Referring Provider: Dr. Nikolai Merritt    Reason for Consult:  ____Advanced Care Planning  __x__Assist with goals of care  __x__Code status  __x__Pt/family support  __x__Symptom Management    PDMP reviewed- opioids prescribed by Dr. Nisha Jasso at the [facility]; No issues noted. Palliative Medicine will not be prescribing but will offer recommendations. Recommendations:  1. Psychosocial support of patient and family: no family present; contacts include Lexii Ta sister  [phone]; Carmencita Hernandez [phone]  (pt has custody of 15/17 yr old grandchildren); 2 sons recently out of care home  2. Assist with goals of care: plan to return home with [facility]; pain control; plan to have urinary stent removed on Monday per Urology  3. Advance Care Planning: none in place; (wants sons Carmencita Hernandez and Alessia Jones to make decisions); provided forms to pt; did not complete; will again offer to assist  4. Anticipatory Guidance: 59 yr old stage 4 cervical cancer undergoing chemo ; following with the [facility]  5. Code Status: full  6. Symptom Management-pain management through Dr. Nisha Jasso; called office to discuss further; for now increased Rise Roup 10/325 to q 4 hrs prn    Chief complaint: Generalized pain    HPI:   Rhys Sequeira is a 59 y.o. female with significant past medical history of COPD, HLD, HTN,  and stage 4 cervical cancer undergoing chemo navelbine and following with the [facility], hx radiation who presented with Abdominal pain and problem with right nephrostomy tube;  has had nephrostomy tubes placed [facility] of 11/18. Pt has been seen previous for abdominal pain and hematuria. Pt was admitted for UTI; urology was consulted. Pt also reported fever; has normal white count. CT abd- notes increase in bilateral adrenal mass. She has been seen in Palliative Medicine outpatient clinic but has not been seen since Feb 2018.  ID, Urology and Hem/Onc have been consulted. Pt has uncontrolled pain despite being on fentanyl, Norco and methadone. Palliative Medicine has been consulted to assist pt/family with establishing goals of care; code status discussion; family support and symptom management. Palliative Medicine Encounter:  Currently Ms Betsy Cho is siting up in bed; c/o of acute pain 10/10 generalized pain. Pt is on Phillips q 6 hrs prn and states q 4 hrs prn is more helpful (changed to q 4 hrs prn); just had fentanyl patch applied; has methadone and Neurontin. Pt is ambulatory; just returned from outside to smoke. States B&O r/s does help with bladder spasms (took 1 dose in 24 hrs). Pt is known to PM; states she wants to follow up with the [facility] for further pain control. Pt has right nephrostomy tube in place, capped with plans for removal on Monday per Urology. Call to the [facility] to discuss pain regimen; Dr. Jaswant Spain not available. Call back from nurse- discussed pain regimen; Oncology was consulted but no notes noted. Again explained PM to pt. She wishes to continue current care; full code and return home. Plans for d/c on Monday. She is wheezy and states she uses albuterol at home; added nebulizer treatment q 6 hr prn if needed. States she is coughing up yellow sputum. Support offered. Spoke with staff.      Past Medical History:   Diagnosis Date    COPD (chronic obstructive pulmonary disease) ([address]     Hyperlipidemia     Hypertension     PMB (postmenopausal bleeding)     Squamous cell carcinoma 02/06/2017    Invasive non-keratinizing squamous cell carcinoma with extensive squamous cell/severe dysplasia LAY 3       Past Surgical History:   Procedure Laterality Date    APPENDECTOMY      COLONOSCOPY N/A 3/13/2019    COLONOSCOPY WITH BIOPSY performed by Jolie Bonds MD at [address] Right 3/4/2019    HIP OPEN REDUCTION INTERNAL FIXATION performed by Sanya Dawson MD at [facility] Right     r hip surgery    LAPAROSCOPY      MOUTH SURGERY      NEPHROSTOMY Right     OTHER SURGICAL HISTORY  06/27/2017    Tandem and Ovoid insertion     OTHER SURGICAL HISTORY N/A 08/04/2017    TONSILLECTOMY      TONSILLECTOMY      URETER STENT PLACEMENT      kidney stents due to kidney failure         Allergies:    Latex; Asa [aspirin]; Other; and Contrast [barium-containing compounds]   Symptom Assessment:  Symptom Present Y/N Medications Number Doses in Past 24 hrs   Pain 10/10 generalized Fentanyl patch 100 mcg/hr q 72 hrs  Neurontin 600 mg tid  Tylenol po 650 mg q 6 hrs prn  Norco po 10/325 mg 1 q 6 hrs prn  Methadone po 10 mg q 8 hrs prn  Morphine IV 2 mg q 4 hrs prn  B&O r/s 16.2/60 bid prn Placed 5/3    2  0    4    3    3    1    Nausea/vomiting y Reglan po 5 mg 4 x daily  Zofran IV 4 mg q 6 hrs prn 4  2   Bowel Regime/constipation  Date of Last BM: 5/3 documented  Colace po 100 mg bid  MOM po 30 ml daily prn 1  0   Anxiety/agitation n Zoloft po 50 mg daily 1   SOB wheezy Added albuterol neb 25 mg q 6 hr prn    Pruritus denies     Appetite poor     Sleep denies     Performance Status decreased       Wt Readings from Last 10 Encounters:   05/03/19 98 lb 12.8 oz (44.8 kg)   04/19/19 96 lb (43.5 kg)   03/12/19 100 lb (45.4 kg)   12/15/18 100 lb (45.4 kg)   11/13/18 110 lb 1.6 oz (49.9 kg)   10/02/18 110 lb (49.9 kg)   02/20/18 115 lb (52.2 kg)   01/30/18 110 lb 6 oz (50.1 kg)   08/16/17 109 lb 6 oz (49.6 kg)   08/04/17 109 lb (49.4 kg)           Social history:  Social History     Socioeconomic History    Marital status:       Spouse name: None    Number of children: None    Years of education: None    Highest education level: None   Occupational History    None   Social Needs    Financial resource strain: None    Food insecurity:     Worry: None     Inability: None    Transportation needs:     Medical: who presented with Abdominal pain and problem with right nephrostomy tube; tube clamped; plan for removal on Monday. Will need nephrostomy tube if tolerates clamping. Has UTI. Plan:   1. UTI- Urology consulted; continue antibiotics; follow cultures; ID consulted   2. Hx of Cervical cancer with worsening disease; follows with [facility]; Oncology to be consulted  3. Full code  4. Congestion; uses albuterol at home; added prn q 6 hrs prn  5. Advance directives - will assist in completion  6. Generalized pain- increased Norco to q 4 hrs prn  7. Oncology was consulted on 4/30; this provided contacted the [facility] to discuss; reviewed pain regimen; no further recommendations at this time  8. Will follow along    Luis Jackson  MSN, APRN-CNS, [facility]  Palliative Medicine    Time in minutes: 70    More than 50% of this interaction was related to counseling and information given r/t disease process; plan of care; and code status. Discussed patient and the plan of care with the other IDT members of Palliative Care Team as well as with patient, family and staff nurse.      Thank you for allowing Palliative Medicine to participate in this patient's care. Never smoker

## 2019-05-03 NOTE — PROGRESS NOTES
Hospitalist Progress Note      PCP: Roberta Varela MD    Date of Admission: 4/29/2019  Days in the hospital: 4    Chief Complaint: Abdominal pain    Hospital Course:     >> admitted for abdominal pain / R nephrostomy tube dislogement  >> Uro / ID consultation - IR to place stent and remove NT    5/2/2019: Palliative care consulted. Continued on Zosyn. Bladder scan ordered. KUB performed. Status post right urinary stent exchange on 5/1/2019    5/3: Nephrostomy tube clamped. Still having lots of pain. Palliative care recommended increasing norco frequency. Subjective  Patient seen and examined at bedside. Patient states that she is still having a lot of pain. Having some nausea and vomiting today as well. Patient denies any fevers, chills, chest pain, shortness of breath.       Medications:  Reviewed    Infusion Medications   Scheduled Medications    gabapentin  600 mg Oral TID    heparin flush  100 Units Intracatheter Daily    piperacillin-tazobactam  3.375 g Intravenous Q8H    sodium chloride flush  10 mL Intravenous 2 times per day    enoxaparin  40 mg Subcutaneous Daily    amLODIPine  5 mg Oral Daily    docusate sodium  100 mg Oral BID    fentaNYL  1 patch Transdermal Q72H    metoclopramide  5 mg Oral 4x Daily AC & HS    oxybutynin  5 mg Oral Daily    sertraline  50 mg Oral Daily     PRN Meds: albuterol, HYDROcodone-acetaminophen, acetaminophen, sodium chloride flush, morphine, nicotine polacrilex, heparin flush, sodium chloride flush, magnesium hydroxide, ondansetron, opium-belladonna, methadone      Intake/Output Summary (Last 24 hours) at 5/3/2019 1508  Last data filed at 5/3/2019 1343  Gross per 24 hour   Intake 720 ml   Output 1495 ml   Net -775 ml       Exam:    BP (!) 113/56   Pulse 116   Temp 97.8 °F (36.6 °C) (Temporal)   Resp 18   Ht 5' 3\" (1.6 m)   Wt 98 lb 12.8 oz (44.8 kg)   SpO2 94%   BMI 17.50 kg/m²     General appearance: No apparent distress, Chronically ill appearing. HEENT: Pupils equal, round, and reactive to light. Conjunctivae/corneas clear. Neck: Supple. No jugular venous distention. Trachea midline. Respiratory:  Normal respiratory effort. Clear to auscultation, bilaterally without Rales/Wheezes/Rhonchi. Cardiovascular: Regular rate and rhythm with normal S1/S2 without murmurs, rubs or gallops. Abdomen: Soft, non-tender, non-distended with normal bowel sounds. Musculoskeletal: No clubbing, cyanosis or edema bilaterally. Brisk capillary refill. 2+ lower extremity pulses (dorsalis pedis). Skin:  No rashes    Neurologic: awake, alert and following commands     Labs:   Recent Labs     05/02/19  0630 05/03/19  0350   WBC 6.4 6.4   HGB 9.4* 8.8*   HCT 29.0* 27.7*    270     No results for input(s): NA, K, CL, CO2, BUN, CREATININE, CALCIUM, PHOS in the last 72 hours. Invalid input(s): MAGNES  No results for input(s): AST, ALT, BILIDIR, BILITOT, ALKPHOS in the last 72 hours. No results for input(s): INR in the last 72 hours. No results for input(s): Willia Beverage in the last 72 hours. Imaging:  XR ABDOMEN (KUB) (SINGLE AP VIEW)   Final Result      NON-SPECIFIC GAS PATTERN      There is bilateral ureteral stents in place as described above. US DUP LOWER EXTREMITY LEFT JANE   Final Result   No evidence of left lower extremity deep venous thrombosis. CT ABDOMEN PELVIS WO CONTRAST   Final Result      1. Left ureteric catheters are in proper position, draining the left   kidney. 2. Catheter draining the left kidney in proper position. 3. Some degree of anasarca and discrete ascites. 4. Pattern of constipation. 5. Bilateral adrenal mass which can further increased size since   previous examination of December 2018. Metastasis to be considered. Please correlate clinically. 6. Midline retroperitoneal adenopathy.             IR GUIDED URETERAL STENT PLACE THRU EXIST TRACT    (Results Pending)   IR INTERVENTIONAL RADIOLOGY PROCEDURE REQUEST    (Results Pending)         Assessment/Plan:  Active Hospital Problems    Diagnosis Date Noted    Palliative care by specialist [Z51.5]     UTI (urinary tract infection) [N39.0] 06/57/1871     Acute Complicated cystitis  Right nephrostomy tube dislogement  Advanced cervical cancer with metastatic disease  COPD - stable  Hypertension - stable  Hyperlipidemia - on statin  CKD 3  Lactic acodosis - RESOLVED  Chronic anemia - stable  Chronic opioid use    Plan:  · Continue with Zosyn  · Palliative care consulted  · Await further recommendations from consultants  · Stent to be removed on Monday    Body mass index is 17.5 kg/m². · DVT Prophylaxis  · Lovenox    · Diet  DIET GENERAL;  Diet NPO Time Specified Exceptions are: Sips with Meds  Dietary Nutrition Supplements: Frozen Oral Supplement    · Code Status  Full Code    · PT/OT Eval Status  · Consulted     · Disposition  · Likely to go home at discharge          [address] [facility] - Chief Hospitalist  Please contact me through perfect serve    NOTE: This report was transcribed using voice recognition software. Every effort was made to ensure accuracy; however, inadvertent computerized transcription errors may be present.

## 2019-05-03 NOTE — PLAN OF CARE
Problem: Inadequate oral food/beverage intake (NI-2.1)  Goal: Food and/or Nutrient Delivery  Description: willing to consume ONS BID. Individualized approach for food/nutrient provision.   Outcome: Not Met This Shift

## 2019-05-04 LAB
ANION GAP SERPL CALCULATED.3IONS-SCNC: 11 MMOL/L (ref 7–16)
BUN BLDV-MCNC: 25 MG/DL (ref 8–23)
CALCIUM SERPL-MCNC: 9.9 MG/DL (ref 8.6–10.2)
CHLORIDE BLD-SCNC: 100 MMOL/L (ref 98–107)
CO2: 25 MMOL/L (ref 22–29)
CREAT SERPL-MCNC: 1.8 MG/DL (ref 0.5–1)
GFR AFRICAN AMERICAN: 35
GFR NON-AFRICAN AMERICAN: 29 ML/MIN/1.73
GLUCOSE BLD-MCNC: 115 MG/DL (ref 74–99)
HCT VFR BLD CALC: 29.6 % (ref 34–48)
HEMOGLOBIN: 9.4 G/DL (ref 11.5–15.5)
MCH RBC QN AUTO: 30.9 PG (ref 26–35)
MCHC RBC AUTO-ENTMCNC: 31.8 % (ref 32–34.5)
MCV RBC AUTO: 97.4 FL (ref 80–99.9)
PDW BLD-RTO: 16.2 FL (ref 11.5–15)
PLATELET # BLD: 325 E9/L (ref 130–450)
PMV BLD AUTO: 9.4 FL (ref 7–12)
POTASSIUM SERPL-SCNC: 5 MMOL/L (ref 3.5–5)
RBC # BLD: 3.04 E12/L (ref 3.5–5.5)
SODIUM BLD-SCNC: 136 MMOL/L (ref 132–146)
WBC # BLD: 6.4 E9/L (ref 4.5–11.5)

## 2019-05-04 PROCEDURE — 85027 COMPLETE CBC AUTOMATED: CPT

## 2019-05-04 PROCEDURE — 6360000002 HC RX W HCPCS: Performed by: CLINICAL NURSE SPECIALIST

## 2019-05-04 PROCEDURE — 6360000002 HC RX W HCPCS: Performed by: INTERNAL MEDICINE

## 2019-05-04 PROCEDURE — 36415 COLL VENOUS BLD VENIPUNCTURE: CPT

## 2019-05-04 PROCEDURE — 6370000000 HC RX 637 (ALT 250 FOR IP): Performed by: CLINICAL NURSE SPECIALIST

## 2019-05-04 PROCEDURE — 1200000000 HC SEMI PRIVATE

## 2019-05-04 PROCEDURE — 80048 BASIC METABOLIC PNL TOTAL CA: CPT

## 2019-05-04 PROCEDURE — 2580000003 HC RX 258: Performed by: HOSPITALIST

## 2019-05-04 PROCEDURE — 6370000000 HC RX 637 (ALT 250 FOR IP): Performed by: PHYSICIAN ASSISTANT

## 2019-05-04 PROCEDURE — 6360000002 HC RX W HCPCS: Performed by: HOSPITALIST

## 2019-05-04 PROCEDURE — 2580000003 HC RX 258: Performed by: INTERNAL MEDICINE

## 2019-05-04 PROCEDURE — 6370000000 HC RX 637 (ALT 250 FOR IP): Performed by: HOSPITALIST

## 2019-05-04 PROCEDURE — 2580000003 HC RX 258: Performed by: FAMILY MEDICINE

## 2019-05-04 PROCEDURE — 99233 SBSQ HOSP IP/OBS HIGH 50: CPT | Performed by: PHYSICIAN ASSISTANT

## 2019-05-04 PROCEDURE — 6360000002 HC RX W HCPCS: Performed by: FAMILY MEDICINE

## 2019-05-04 PROCEDURE — 6370000000 HC RX 637 (ALT 250 FOR IP): Performed by: FAMILY MEDICINE

## 2019-05-04 PROCEDURE — 94640 AIRWAY INHALATION TREATMENT: CPT

## 2019-05-04 RX ORDER — SODIUM CHLORIDE 9 MG/ML
INJECTION, SOLUTION INTRAVENOUS CONTINUOUS
Status: DISCONTINUED | OUTPATIENT
Start: 2019-05-04 | End: 2019-05-06 | Stop reason: HOSPADM

## 2019-05-04 RX ORDER — ANALGESIC BALM 1.74; 4.06 G/29G; G/29G
OINTMENT TOPICAL ONCE
Status: COMPLETED | OUTPATIENT
Start: 2019-05-04 | End: 2019-05-04

## 2019-05-04 RX ORDER — MORPHINE SULFATE 2 MG/ML
2 INJECTION, SOLUTION INTRAMUSCULAR; INTRAVENOUS ONCE
Status: COMPLETED | OUTPATIENT
Start: 2019-05-04 | End: 2019-05-04

## 2019-05-04 RX ORDER — SENNA AND DOCUSATE SODIUM 50; 8.6 MG/1; MG/1
2 TABLET, FILM COATED ORAL 2 TIMES DAILY
Status: DISCONTINUED | OUTPATIENT
Start: 2019-05-04 | End: 2019-05-06 | Stop reason: HOSPADM

## 2019-05-04 RX ORDER — METOCLOPRAMIDE 10 MG/1
10 TABLET ORAL
Status: DISCONTINUED | OUTPATIENT
Start: 2019-05-04 | End: 2019-05-06 | Stop reason: HOSPADM

## 2019-05-04 RX ADMIN — ONDANSETRON HYDROCHLORIDE 4 MG: 2 SOLUTION INTRAMUSCULAR; INTRAVENOUS at 13:25

## 2019-05-04 RX ADMIN — SODIUM CHLORIDE: 9 INJECTION, SOLUTION INTRAVENOUS at 10:48

## 2019-05-04 RX ADMIN — METOCLOPRAMIDE 5 MG: 5 TABLET ORAL at 06:33

## 2019-05-04 RX ADMIN — HYDROCODONE BITARTRATE AND ACETAMINOPHEN 1 TABLET: 10; 325 TABLET ORAL at 06:33

## 2019-05-04 RX ADMIN — GABAPENTIN 600 MG: 300 CAPSULE ORAL at 09:30

## 2019-05-04 RX ADMIN — SENNOSIDES, DOCUSATE SODIUM 2 TABLET: 50; 8.6 TABLET, FILM COATED ORAL at 20:16

## 2019-05-04 RX ADMIN — METOCLOPRAMIDE 10 MG: 10 TABLET ORAL at 20:16

## 2019-05-04 RX ADMIN — Medication 10 ML: at 10:49

## 2019-05-04 RX ADMIN — OXYBUTYNIN CHLORIDE 5 MG: 5 TABLET ORAL at 09:23

## 2019-05-04 RX ADMIN — MORPHINE SULFATE 2 MG: 2 INJECTION, SOLUTION INTRAMUSCULAR; INTRAVENOUS at 21:34

## 2019-05-04 RX ADMIN — HYDROCODONE BITARTRATE AND ACETAMINOPHEN 1 TABLET: 10; 325 TABLET ORAL at 20:16

## 2019-05-04 RX ADMIN — MORPHINE SULFATE 2 MG: 2 INJECTION, SOLUTION INTRAMUSCULAR; INTRAVENOUS at 00:57

## 2019-05-04 RX ADMIN — MORPHINE SULFATE 2 MG: 2 INJECTION, SOLUTION INTRAMUSCULAR; INTRAVENOUS at 09:25

## 2019-05-04 RX ADMIN — MORPHINE SULFATE 2 MG: 2 INJECTION, SOLUTION INTRAMUSCULAR; INTRAVENOUS at 05:20

## 2019-05-04 RX ADMIN — ENOXAPARIN SODIUM 40 MG: 40 INJECTION SUBCUTANEOUS at 09:22

## 2019-05-04 RX ADMIN — PIPERACILLIN AND TAZOBACTAM 3.38 G: 3; .375 INJECTION, POWDER, FOR SOLUTION INTRAVENOUS at 05:20

## 2019-05-04 RX ADMIN — MORPHINE SULFATE 2 MG: 2 INJECTION, SOLUTION INTRAMUSCULAR; INTRAVENOUS at 10:49

## 2019-05-04 RX ADMIN — MORPHINE SULFATE 2 MG: 2 INJECTION, SOLUTION INTRAMUSCULAR; INTRAVENOUS at 16:54

## 2019-05-04 RX ADMIN — METOCLOPRAMIDE 5 MG: 5 TABLET ORAL at 11:50

## 2019-05-04 RX ADMIN — DOCUSATE SODIUM 100 MG: 100 CAPSULE, LIQUID FILLED ORAL at 09:24

## 2019-05-04 RX ADMIN — PIPERACILLIN AND TAZOBACTAM 3.38 G: 3; .375 INJECTION, POWDER, FOR SOLUTION INTRAVENOUS at 13:42

## 2019-05-04 RX ADMIN — PIPERACILLIN AND TAZOBACTAM 3.38 G: 3; .375 INJECTION, POWDER, FOR SOLUTION INTRAVENOUS at 20:17

## 2019-05-04 RX ADMIN — AMLODIPINE BESYLATE 5 MG: 5 TABLET ORAL at 09:23

## 2019-05-04 RX ADMIN — ALBUTEROL SULFATE 2.5 MG: 2.5 SOLUTION RESPIRATORY (INHALATION) at 19:45

## 2019-05-04 RX ADMIN — Medication 10 ML: at 20:28

## 2019-05-04 RX ADMIN — GABAPENTIN 600 MG: 300 CAPSULE ORAL at 20:16

## 2019-05-04 RX ADMIN — METOCLOPRAMIDE 5 MG: 5 TABLET ORAL at 16:54

## 2019-05-04 RX ADMIN — GABAPENTIN 600 MG: 300 CAPSULE ORAL at 14:33

## 2019-05-04 RX ADMIN — ANALGESIC BALM: 1.74; 4.06 OINTMENT TOPICAL at 13:19

## 2019-05-04 RX ADMIN — SERTRALINE 50 MG: 50 TABLET, FILM COATED ORAL at 09:23

## 2019-05-04 RX ADMIN — HEPARIN 100 UNITS: 100 SYRINGE at 09:22

## 2019-05-04 ASSESSMENT — PAIN DESCRIPTION - PAIN TYPE
TYPE: ACUTE PAIN

## 2019-05-04 ASSESSMENT — PAIN SCALES - GENERAL
PAINLEVEL_OUTOF10: 10
PAINLEVEL_OUTOF10: 0
PAINLEVEL_OUTOF10: 0
PAINLEVEL_OUTOF10: 10

## 2019-05-04 ASSESSMENT — PAIN DESCRIPTION - LOCATION
LOCATION: ABDOMEN;BACK
LOCATION: ABDOMEN;BACK;LEG
LOCATION: ABDOMEN;BACK
LOCATION: ABDOMEN;BACK;LEG
LOCATION: ABDOMEN;BACK;LEG
LOCATION: ABDOMEN;BACK

## 2019-05-04 ASSESSMENT — PAIN DESCRIPTION - ORIENTATION
ORIENTATION: LEFT;LOWER;MID

## 2019-05-04 ASSESSMENT — PAIN DESCRIPTION - DESCRIPTORS
DESCRIPTORS: ACHING;CONSTANT;DISCOMFORT
DESCRIPTORS: DISCOMFORT;ACHING;SORE
DESCRIPTORS: DISCOMFORT;ACHING;SORE
DESCRIPTORS: ACHING;CONSTANT;DISCOMFORT
DESCRIPTORS: DISCOMFORT;ACHING;SORE
DESCRIPTORS: ACHING;CONSTANT;SORE

## 2019-05-04 ASSESSMENT — PAIN DESCRIPTION - DIRECTION
RADIATING_TOWARDS: DOWN LT LEG
RADIATING_TOWARDS: L LEG
RADIATING_TOWARDS: L LEG
RADIATING_TOWARDS: DOWN LT LEG
RADIATING_TOWARDS: DOWN LT LEG

## 2019-05-04 ASSESSMENT — PAIN DESCRIPTION - PROGRESSION
CLINICAL_PROGRESSION: NOT CHANGED

## 2019-05-04 ASSESSMENT — PAIN DESCRIPTION - ONSET
ONSET: ON-GOING

## 2019-05-04 ASSESSMENT — PAIN DESCRIPTION - FREQUENCY
FREQUENCY: CONTINUOUS

## 2019-05-04 ASSESSMENT — PAIN - FUNCTIONAL ASSESSMENT
PAIN_FUNCTIONAL_ASSESSMENT: PREVENTS OR INTERFERES SOME ACTIVE ACTIVITIES AND ADLS

## 2019-05-04 NOTE — PROGRESS NOTES
Palliative Care Department  Palliative Care Progress Note  Provider: Syedclifford [facility] days prior to Consult: Hospital Day: 6    Referring Provider:  Dr. Dory Baldwin    Reason for Consult:  [x]  Code status Discussion  [x]  Assist with goals of care  [x]  Psychosocial support  [x]  Symptom Management       Chief Complaint: Generalized pain    Subjective:   5/4/19  Patient awake and alert in bed. She complains of some continued pain with a radicular pain down her legs. She reports this is somewhat new. She's been on a pain regimen for sometime she reports. She continues to have nausea. She is not able to eat. She denies shortness of breath diarrhea or constipation  No family at bedside currently    5/3/19  HPI:   Bari Parsons is a 59 y.o. female with significant past medical history of COPD, HLD, HTN,  and stage 4 cervical cancer undergoing chemo navelbine and following [facility], hx radiation who presented with Abdominal pain and problem with right nephrostomy tube;  has had nephrostomy tubes placed [facility] of 11/18. Pt has been seen previous for abdominal pain and hematuria. Pt was admitted for UTI; urology was consulted. Pt also reported fever; has normal white count. CT abd- notes increase in bilateral adrenal mass. She has been seen in Palliative Medicine outpatient clinic but has not been seen since Feb 2018. ID, Urology and Hem/Onc have been consulted. Pt has uncontrolled pain despite being on fentanyl, Norco and methadone. Palliative Medicine has been consulted to assist pt/family with establishing goals of care; code status discussion; family support and symptom management.       Palliative Medicine Encounter:  Currently Ms Mccord Patient is siting up in bed; c/o of acute pain 10/10 generalized pain. Pt is on [place] q 6 hrs prn and states q 4 hrs prn is more helpful (changed to q 4 hrs prn); just had fentanyl patch applied; has methadone and Neurontin.   Pt is ambulatory; just returned from outside to smoke. States B&O r/s does help with bladder spasms (took 1 dose in 24 hrs). Pt is known to PM; states she wants to follow up with the [facility] for further pain control. Pt has right nephrostomy tube in place, capped with plans for removal on Monday per Urology. Call to the [facility] to discuss pain regimen; Dr. Marie Robles not available. Call back from nurse- discussed pain regimen; Oncology was consulted but no notes noted. Again explained PM to pt. She wishes to continue current care; full code and return home. Plans for d/c on Monday. She is wheezy and states she uses albuterol at home; added nebulizer treatment q 6 hr prn if needed. States she is coughing up yellow sputum. Support offered. Spoke with staff.      Goals of care: provide comfort care/support/palliation/relieve suffering and continue current management    Code Status: full code    Advanced Directives: none    Surrogate/Legal NOK: wants her sons, AD provided didn't complete  Contacts: Jewell November [phone], Danilo Burris sister  [phone]    Spiritual assessment: No spiritual distress identified     Bereavement and grief: to be determined    Past Medical History:   Diagnosis Date    COPD [facility]chronic obstructive pulmonary disease) ([facility] [address]     Hyperlipidemia     Hypertension     PMB (postmenopausal bleeding)     Squamous cell carcinoma 02/06/2017    Invasive non-keratinizing squamous cell carcinoma with extensive squamous cell/severe dysplasia LAY 3       Past Surgical History:   Procedure Laterality Date    APPENDECTOMY      COLONOSCOPY N/A 3/13/2019    COLONOSCOPY WITH BIOPSY performed by Lina Chopra MD at [address] [place] [address] Right 3/4/2019    HIP OPEN REDUCTION INTERNAL FIXATION performed by Surekha Mcwilliams MD at [place] [address] Right     r hip surgery    LAPAROSCOPY      MOUTH SURGERY      NEPHROSTOMY Right     OTHER SURGICAL HISTORY  06/27/2017    Tandem and Ovoid insertion     OTHER SURGICAL HISTORY N/A 08/04/2017    TONSILLECTOMY      TONSILLECTOMY      URETER STENT PLACEMENT      kidney stents due to kidney failure       History reviewed. No pertinent family history. Social history:  [place] status: no  Marital status:    Living status: with family:  grandchildren   Work history: unknown    History obtain from patient    Current Medications:  Inpatient medications reviewed: yes  Home Medications reviewed: yes    Allergies   Allergen Reactions    Latex Anaphylaxis     \"throat closes\"    Asa [Aspirin] Anaphylaxis     \"causes throat to close\"    Other Shortness Of Breath     Seafood    Contrast [Barium-Containing Compounds] Nausea And Vomiting       Review of Systems:  Per HPI remaining ros negative    Objective:   PHYSICAL EXAM:   Vitals:    /60   Pulse 78   Temp 97.8 °F (36.6 °C) (Temporal)   Resp 20   Ht 5' 3\" (1.6 m)   Wt 112 lb 3.4 oz (50.9 kg)   SpO2 96%   BMI 19.88 kg/m²   Gen: cachectic, ill-appearing, thin, NAD, awake, alert   HEENT: temporal wasting, poor dentition no upper teeth/dentures, PERRL,sclera anicteric, conjunctiva pink and moist  Neck: supple  Lungs: respirations easy not labored, wheezing and rhonci   Heart: regular rate and rhythm, distant heart tones, murmur appreciated   Abdomen: normoactive bowel sounds, soft, non-tender, non-distended  Extremities: muscle atrophy of extremities, no clubbing, cyanosis or edema  Skin: warm, dry without rashes, lesions, bruising  Neuro: awake, alert, oriented x 3, follows commands, no gross neurologic deficits    Results/Verification of Data Review  Objective data reviewed: labs, images, records, medication use, vitals and chart    Assessment/Plan      Active Hospital Problems    Diagnosis Date Noted    Palliative care by specialist [Z51.5]     UTI (urinary tract infection) [N39.0] 04/29/2019       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 Family Meeting:  Participants:patient  Family meeting was held to discuss:diagnosis, prognosis, treatment options, goals of care, prior expressed wishes, advanced care planning, symptom management and discharge plan     Discharge planning: to be determined    Patient meets criteria for general inpatient hospice care including the following: N/A- Palliative Care Patient    Data in Support of Terminal Illness:N/A Palliative Patient. Discussed patient and the plan of care with the other IDT members of Palliative Care and Dr. Molly Castro, and with patient and floor nurse. Referrals to: none today    Time/Communication  Greater than 51% of time spent, total 35 minutes in counseling and coordination of care at the bedside regarding goals of care, symptom management, diagnosis and prognosis and see above. Toan Llanos PA-C  Palliative Medicine    Thank you for allowing Palliative Medicine to participate in the care of Kiran Marvin. Note: This report was completed using computerdocBeat voiced recognition software. Every effort has been made to ensure accuracy; however, inadvertent computerized transcription errors may be present.

## 2019-05-04 NOTE — PROGRESS NOTES
Hospitalist Progress Note      Synopsis: Patient admitted on 4/29/2019  58 y.o. female PMH of COPD, HTN, HLD, advanced cervical cancer on chemo next RX may 15, presenting to the ED for abdominal pain, beginning today. Bonny Juarez also complains of problem with her right nephrostomy tube as the dressing is falling off.  She reports history of COPD reports her breathing is at her baseline. She has fever chills, no vomiting but has nausea no changes in her pulmo status, on RA, in ER found to have uti, lab normal WBC, hb 10.7, UA grossly abnormal, ctap showed left ureteric cath in proper position, BL adrenal mass increased in size since December 2018    >> admitted for abdominal pain / R nephrostomy tube dislogement  >> Uro / ID consultation - IR to place stent and remove NT  >> Urology to remove NT Monday. Subjective    No complaints except knee pain. IR for stent placement and removal of NT Monday      Exam:  /61   Pulse 97   Temp 97.7 °F (36.5 °C) (Temporal)   Resp 18   Ht 5' 3\" (1.6 m)   Wt 112 lb 3.4 oz (50.9 kg)   SpO2 97%   BMI 19.88 kg/m²   General appearance: No apparent distress, Chronically ill appearing. HEENT: Pupils equal, round, and reactive to light. Conjunctivae/corneas clear. Neck: Supple. No jugular venous distention. Trachea midline. Respiratory:  Normal respiratory effort. Clear to auscultation, bilaterally without Rales/Wheezes/Rhonchi. Cardiovascular: Regular rate and rhythm with normal S1/S2 without murmurs, rubs or gallops. Abdomen: Soft, non-tender, non-distended with normal bowel sounds. Musculoskeletal: No clubbing, cyanosis. + Trace edema bilaterally. Brisk capillary refill. 2+ lower extremity pulses (dorsalis pedis).    Skin:  No rashes    Neurologic: awake, alert and following commands     Medications:  Reviewed    Infusion Medications    sodium chloride       Scheduled Medications    gabapentin  600 mg Oral TID    heparin flush  100 Units Intracatheter Daily    piperacillin-tazobactam  3.375 g Intravenous Q8H    sodium chloride flush  10 mL Intravenous 2 times per day    enoxaparin  40 mg Subcutaneous Daily    amLODIPine  5 mg Oral Daily    docusate sodium  100 mg Oral BID    fentaNYL  1 patch Transdermal Q72H    metoclopramide  5 mg Oral 4x Daily AC & HS    oxybutynin  5 mg Oral Daily    sertraline  50 mg Oral Daily     PRN Meds: albuterol, HYDROcodone-acetaminophen, acetaminophen, sodium chloride flush, morphine, nicotine polacrilex, heparin flush, sodium chloride flush, magnesium hydroxide, ondansetron, opium-belladonna, methadone    I/O    Intake/Output Summary (Last 24 hours) at 5/4/2019 0906  Last data filed at 5/4/2019 4980  Gross per 24 hour   Intake 1125 ml   Output 525 ml   Net 600 ml       Labs:   Recent Labs     05/02/19  0630 05/03/19  0350 05/04/19  0525   WBC 6.4 6.4 6.4   HGB 9.4* 8.8* 9.4*   HCT 29.0* 27.7* 29.6*    270 325       Recent Labs     05/04/19  0525      K 5.0      CO2 25   BUN 25*   CREATININE 1.8*   CALCIUM 9.9       No results for input(s): PROT, ALB, ALKPHOS, ALT, AST, BILITOT, AMYLASE, LIPASE in the last 72 hours. No results for input(s): INR in the last 72 hours. No results for input(s): Clarance Lloyd in the last 72 hours. Chronic labs:  Lab Results   Component Value Date    INR 1.1 04/30/2019       Radiology:  Imaging studies reviewed today. ASSESSMENT:    Acute Complicated cystitis  Right nephrostomy tube dislogement  Advanced cervical cancer with metastatic disease  COPD - stable  Hypertension - stable  Hyperlipidemia - on statin  CKD 3  Lactic acodosis - RESOLVED  Chronic anemia - stable  Chronic opioid use  Severe protein calorie malnutrition     PLAN:    -- ID following for complicated cystitis  -- Urology: remove NT Monday if tolerates clamping over weekend. IR does no intervene unless emergency cases onweekend  -- SCr up, increase IVF, monitor BMP, LUIS hose.   -- Palliative following  -- [place] to knees  -- ? Hyperalgesia due to many opioid agonists? May need to back off pain meds perhaps - appreciate Palliative input in this regard. Diet: DIET GENERAL;  Diet NPO Time Specified Exceptions are: Sips with Meds  Dietary Nutrition Supplements: Frozen Oral Supplement  Code Status: Full Code    +++++++++++++++++++++++++++++++++++++++++++++++++  Maria Isabel Hunter MD   [facility] [facility].  +++++++++++++++++++++++++++++++++++++++++++++++++  NOTE: This report was transcribed using voice recognition software. Every effort was made to ensure accuracy; however, inadvertent computerized transcription errors may be present.

## 2019-05-04 NOTE — PROGRESS NOTES
ID Progress Note                [address] [facility], [address], [address] [place] [address]            Phone [phone]     Fax [phone]      Chief complaint   abdo pain/fever    Subjective:  abdo pain better controlled  Nephrostomy tube has been clamped  The patient is awake and alert. Tolerating medications. Reports no side effects. Afebrile. 10 ROS otherwise negative unless otherwise specified above. Objective:    Vitals:    05/04/19 0915   BP: 124/70   Pulse: 82   Resp: 18   Temp: 98.2 °F (36.8 °C)   SpO2: 98%     General appearance: Alert, Awake, Oriented times 3, no distress  Skin: Warm and dry  Eyes: Pink palpebral conjunctivae. PERRL  Ears: External ears normal.  Nose/Sinuses: Nares normal. Septum midline. Mucosa normal. No sinus tenderness. Oropharynx: Oropharynx clear with no exudates seen  Neck: Neck supple. No jugular venous distension, lymphadenopathy or thyromegaly Trachea midline  Lungs: Lungs clear to auscultation bilaterally. No rhonchi, crackles or wheezes  Heart: S1 S2  Regular rate and rhythm. No rub, murmur or gallop  Abdomen: Abdomen soft, non-tender. BS normal. No masses, organomegaly  Extremities: No edema, Peripheral pulses palpable  Musculoskeletal: Muscular strength appears intact.  No joint effusion, tenderness, swelling or warmth        Labs:  Recent Labs     05/02/19  0630 05/03/19  0350 05/04/19  0525   WBC 6.4 6.4 6.4   RBC 3.01* 2.85* 3.04*   HGB 9.4* 8.8* 9.4*   HCT 29.0* 27.7* 29.6*   MCV 96.3 97.2 97.4   MCH 31.2 30.9 30.9   MCHC 32.4 31.8* 31.8*   RDW 16.2* 16.0* 16.2*    270 325   MPV 9.3 9.2 9.4     CMP:    Lab Results   Component Value Date     05/04/2019    K 5.0 05/04/2019    K 3.8 04/30/2019     05/04/2019    CO2 25 05/04/2019    BUN 25 05/04/2019    CREATININE 1.8 05/04/2019    GFRAA 35 05/04/2019    LABGLOM 29 05/04/2019    GLUCOSE 115 05/04/2019    PROT 6.4 04/29/2019    LABALBU 3.3 04/29/2019    CALCIUM 9.9 05/04/2019    BILITOT <0.2 04/29/2019    ALKPHOS 65 04/29/2019    AST 8 04/29/2019    ALT <5 04/29/2019          Microbiology :  No results for input(s): BC in the last 72 hours. No results for input(s): Inez Alu in the last 72 hours. No results for input(s): LABURIN in the last 72 hours. No results for input(s): CULTRESP in the last 72 hours. No results for input(s): WNDABS in the last 72 hours. Radiology :  XR ABDOMEN (KUB) (SINGLE AP VIEW)   Final Result      NON-SPECIFIC GAS PATTERN      There is bilateral ureteral stents in place as described above. US DUP LOWER EXTREMITY LEFT JANE   Final Result   No evidence of left lower extremity deep venous thrombosis. CT ABDOMEN PELVIS WO CONTRAST   Final Result      1. Left ureteric catheters are in proper position, draining the left   kidney. 2. Catheter draining the left kidney in proper position. 3. Some degree of anasarca and discrete ascites. 4. Pattern of constipation. 5. Bilateral adrenal mass which can further increased size since   previous examination of December 2018. Metastasis to be considered. Please correlate clinically. 6. Midline retroperitoneal adenopathy. IR GUIDED URETERAL STENT PLACE THRU EXIST TRACT    (Results Pending)   IR INTERVENTIONAL RADIOLOGY PROCEDURE REQUEST    (Results Pending)         URINARY CATHETER OUTPUT ([place]):  [REMOVED] Nephrostomy Right-Output (mL): 200 mL  Nephrostomy 1 Right 8 fr-Output (mL): 100 mL    Assessment and Plan:         1.  Complicated UTI (UA from clean catch, not nephrostomy bag)  2 history of cervical cancer status post chemo and radiation therapy  3 history of bilateral urinary stents/right-sided nephrostomy tube in place  4 status post IR guided right stent exchange on 5/1/2019     Plan  - Continue with Zosyn for now  - Follow urine cultures  - blood cultures negative so far  - as per urology , nephrostomy tube to be removed on Monday             Electronically signed by Riky Amanda MD on 5/4/2019 at 2:36 PM

## 2019-05-04 NOTE — PLAN OF CARE
Problem: Falls - Risk of:  Goal: Will remain free from falls  Description  Will remain free from falls  5/3/2019 2350 by Nicol Gallegos RN  Outcome: Met This Shift  5/3/2019 1041 by Poly Arvizu RN  Outcome: Met This Shift  Goal: Absence of physical injury  Description  Absence of physical injury  Outcome: Met This Shift     Problem: Risk for Impaired Skin Integrity  Goal: Tissue integrity - skin and mucous membranes  Description  Structural intactness and normal physiological function of skin and  mucous membranes.   Outcome: Met This Shift     Problem: Musculor/Skeletal Functional Status  Goal: Highest potential functional level  5/3/2019 2350 by Nicol Gallegos RN  Outcome: Met This Shift  5/3/2019 1041 by Poly Arvizu RN  Outcome: Ongoing  Goal: Absence of falls  Outcome: Met This Shift

## 2019-05-04 NOTE — PLAN OF CARE
Problem: Falls - Risk of:  Goal: Will remain free from falls  Description  Will remain free from falls  Outcome: Met This Shift  Goal: Absence of physical injury  Description  Absence of physical injury  Outcome: Met This Shift     Problem: Risk for Impaired Skin Integrity  Goal: Tissue integrity - skin and mucous membranes  Description  Structural intactness and normal physiological function of skin and  mucous membranes.   Outcome: Met This Shift     Problem: Pain:  Goal: Pain level will decrease  Description  Pain level will decrease  Outcome: Met This Shift  Goal: Control of acute pain  Description  Control of acute pain  Outcome: Met This Shift  Goal: Control of chronic pain  Description  Control of chronic pain  Outcome: Met This Shift

## 2019-05-05 LAB
ANION GAP SERPL CALCULATED.3IONS-SCNC: 13 MMOL/L (ref 7–16)
BLOOD CULTURE, ROUTINE: NORMAL
BUN BLDV-MCNC: 21 MG/DL (ref 8–23)
CALCIUM SERPL-MCNC: 9.7 MG/DL (ref 8.6–10.2)
CHLORIDE BLD-SCNC: 101 MMOL/L (ref 98–107)
CO2: 24 MMOL/L (ref 22–29)
CREAT SERPL-MCNC: 1.5 MG/DL (ref 0.5–1)
GFR AFRICAN AMERICAN: 43
GFR NON-AFRICAN AMERICAN: 35 ML/MIN/1.73
GLUCOSE BLD-MCNC: 98 MG/DL (ref 74–99)
HCT VFR BLD CALC: 33 % (ref 34–48)
HEMOGLOBIN: 10.2 G/DL (ref 11.5–15.5)
MCH RBC QN AUTO: 30.6 PG (ref 26–35)
MCHC RBC AUTO-ENTMCNC: 30.9 % (ref 32–34.5)
MCV RBC AUTO: 99.1 FL (ref 80–99.9)
PDW BLD-RTO: 16.3 FL (ref 11.5–15)
PLATELET # BLD: 353 E9/L (ref 130–450)
PMV BLD AUTO: 9.3 FL (ref 7–12)
POTASSIUM REFLEX MAGNESIUM: 4.8 MMOL/L (ref 3.5–5)
RBC # BLD: 3.33 E12/L (ref 3.5–5.5)
SODIUM BLD-SCNC: 138 MMOL/L (ref 132–146)
WBC # BLD: 6.6 E9/L (ref 4.5–11.5)

## 2019-05-05 PROCEDURE — 94640 AIRWAY INHALATION TREATMENT: CPT

## 2019-05-05 PROCEDURE — 6370000000 HC RX 637 (ALT 250 FOR IP): Performed by: FAMILY MEDICINE

## 2019-05-05 PROCEDURE — 2580000003 HC RX 258: Performed by: INTERNAL MEDICINE

## 2019-05-05 PROCEDURE — 6370000000 HC RX 637 (ALT 250 FOR IP): Performed by: HOSPITALIST

## 2019-05-05 PROCEDURE — 6360000002 HC RX W HCPCS: Performed by: INTERNAL MEDICINE

## 2019-05-05 PROCEDURE — 6370000000 HC RX 637 (ALT 250 FOR IP): Performed by: PHYSICIAN ASSISTANT

## 2019-05-05 PROCEDURE — 85027 COMPLETE CBC AUTOMATED: CPT

## 2019-05-05 PROCEDURE — 6360000002 HC RX W HCPCS: Performed by: CLINICAL NURSE SPECIALIST

## 2019-05-05 PROCEDURE — 36415 COLL VENOUS BLD VENIPUNCTURE: CPT

## 2019-05-05 PROCEDURE — 80048 BASIC METABOLIC PNL TOTAL CA: CPT

## 2019-05-05 PROCEDURE — 1200000000 HC SEMI PRIVATE

## 2019-05-05 PROCEDURE — 6360000002 HC RX W HCPCS: Performed by: HOSPITALIST

## 2019-05-05 PROCEDURE — 6370000000 HC RX 637 (ALT 250 FOR IP): Performed by: CLINICAL NURSE SPECIALIST

## 2019-05-05 RX ORDER — LOPERAMIDE HYDROCHLORIDE 2 MG/1
4 CAPSULE ORAL EVERY 6 HOURS PRN
Status: DISCONTINUED | OUTPATIENT
Start: 2019-05-05 | End: 2019-05-06 | Stop reason: HOSPADM

## 2019-05-05 RX ADMIN — METHADONE HYDROCHLORIDE 10 MG: 10 TABLET ORAL at 14:46

## 2019-05-05 RX ADMIN — GABAPENTIN 600 MG: 300 CAPSULE ORAL at 21:48

## 2019-05-05 RX ADMIN — GABAPENTIN 600 MG: 300 CAPSULE ORAL at 14:06

## 2019-05-05 RX ADMIN — SERTRALINE 50 MG: 50 TABLET, FILM COATED ORAL at 09:09

## 2019-05-05 RX ADMIN — MORPHINE SULFATE 2 MG: 2 INJECTION, SOLUTION INTRAMUSCULAR; INTRAVENOUS at 13:17

## 2019-05-05 RX ADMIN — METOCLOPRAMIDE 10 MG: 10 TABLET ORAL at 06:24

## 2019-05-05 RX ADMIN — LOPERAMIDE HYDROCHLORIDE 4 MG: 2 CAPSULE ORAL at 18:39

## 2019-05-05 RX ADMIN — PIPERACILLIN AND TAZOBACTAM 3.38 G: 3; .375 INJECTION, POWDER, FOR SOLUTION INTRAVENOUS at 14:06

## 2019-05-05 RX ADMIN — HYDROCODONE BITARTRATE AND ACETAMINOPHEN 1 TABLET: 10; 325 TABLET ORAL at 00:49

## 2019-05-05 RX ADMIN — METOCLOPRAMIDE 10 MG: 10 TABLET ORAL at 21:48

## 2019-05-05 RX ADMIN — ALBUTEROL SULFATE 2.5 MG: 2.5 SOLUTION RESPIRATORY (INHALATION) at 19:37

## 2019-05-05 RX ADMIN — SENNOSIDES, DOCUSATE SODIUM 2 TABLET: 50; 8.6 TABLET, FILM COATED ORAL at 21:48

## 2019-05-05 RX ADMIN — PIPERACILLIN AND TAZOBACTAM 3.38 G: 3; .375 INJECTION, POWDER, FOR SOLUTION INTRAVENOUS at 06:24

## 2019-05-05 RX ADMIN — HYDROCODONE BITARTRATE AND ACETAMINOPHEN 1 TABLET: 10; 325 TABLET ORAL at 12:11

## 2019-05-05 RX ADMIN — OXYBUTYNIN CHLORIDE 5 MG: 5 TABLET ORAL at 09:09

## 2019-05-05 RX ADMIN — METOCLOPRAMIDE 10 MG: 10 TABLET ORAL at 17:01

## 2019-05-05 RX ADMIN — MORPHINE SULFATE 2 MG: 2 INJECTION, SOLUTION INTRAMUSCULAR; INTRAVENOUS at 21:47

## 2019-05-05 RX ADMIN — MORPHINE SULFATE 2 MG: 2 INJECTION, SOLUTION INTRAMUSCULAR; INTRAVENOUS at 09:09

## 2019-05-05 RX ADMIN — MORPHINE SULFATE 2 MG: 2 INJECTION, SOLUTION INTRAMUSCULAR; INTRAVENOUS at 04:09

## 2019-05-05 RX ADMIN — METOCLOPRAMIDE 10 MG: 10 TABLET ORAL at 12:09

## 2019-05-05 RX ADMIN — HYDROCODONE BITARTRATE AND ACETAMINOPHEN 1 TABLET: 10; 325 TABLET ORAL at 07:39

## 2019-05-05 RX ADMIN — ENOXAPARIN SODIUM 40 MG: 40 INJECTION SUBCUTANEOUS at 09:09

## 2019-05-05 RX ADMIN — PIPERACILLIN AND TAZOBACTAM 3.38 G: 3; .375 INJECTION, POWDER, FOR SOLUTION INTRAVENOUS at 21:47

## 2019-05-05 RX ADMIN — MORPHINE SULFATE 2 MG: 2 INJECTION, SOLUTION INTRAMUSCULAR; INTRAVENOUS at 17:27

## 2019-05-05 RX ADMIN — ALBUTEROL SULFATE 2.5 MG: 2.5 SOLUTION RESPIRATORY (INHALATION) at 04:05

## 2019-05-05 RX ADMIN — ALBUTEROL SULFATE 2.5 MG: 2.5 SOLUTION RESPIRATORY (INHALATION) at 14:27

## 2019-05-05 RX ADMIN — GABAPENTIN 600 MG: 300 CAPSULE ORAL at 09:09

## 2019-05-05 RX ADMIN — AMLODIPINE BESYLATE 5 MG: 5 TABLET ORAL at 09:09

## 2019-05-05 ASSESSMENT — PAIN DESCRIPTION - PAIN TYPE
TYPE: CHRONIC PAIN;SURGICAL PAIN
TYPE: ACUTE PAIN
TYPE: ACUTE PAIN

## 2019-05-05 ASSESSMENT — PAIN DESCRIPTION - DESCRIPTORS
DESCRIPTORS: ACHING;CONSTANT;DISCOMFORT

## 2019-05-05 ASSESSMENT — PAIN SCALES - GENERAL
PAINLEVEL_OUTOF10: 10
PAINLEVEL_OUTOF10: 3
PAINLEVEL_OUTOF10: 7
PAINLEVEL_OUTOF10: 10
PAINLEVEL_OUTOF10: 10
PAINLEVEL_OUTOF10: 6
PAINLEVEL_OUTOF10: 6
PAINLEVEL_OUTOF10: 10

## 2019-05-05 ASSESSMENT — PAIN DESCRIPTION - FREQUENCY: FREQUENCY: CONTINUOUS

## 2019-05-05 ASSESSMENT — PAIN DESCRIPTION - ORIENTATION
ORIENTATION: RIGHT;LEFT
ORIENTATION: RIGHT

## 2019-05-05 ASSESSMENT — PAIN DESCRIPTION - DIRECTION: RADIATING_TOWARDS: L LEG

## 2019-05-05 ASSESSMENT — PAIN DESCRIPTION - LOCATION
LOCATION: ABDOMEN;BACK
LOCATION: LEG
LOCATION: ABDOMEN;BACK

## 2019-05-05 NOTE — PROGRESS NOTES
CALCIUM 9.7 05/05/2019    BILITOT <0.2 04/29/2019    ALKPHOS 65 04/29/2019    AST 8 04/29/2019    ALT <5 04/29/2019          Microbiology :  No results for input(s): BC in the last 72 hours. No results for input(s): Georgann Favia in the last 72 hours. No results for input(s): LABURIN in the last 72 hours. No results for input(s): CULTRESP in the last 72 hours. No results for input(s): WNDABS in the last 72 hours. Radiology :  XR ABDOMEN (KUB) (SINGLE AP VIEW)   Final Result      NON-SPECIFIC GAS PATTERN      There is bilateral ureteral stents in place as described above. US DUP LOWER EXTREMITY LEFT JANE   Final Result   No evidence of left lower extremity deep venous thrombosis. CT ABDOMEN PELVIS WO CONTRAST   Final Result      1. Left ureteric catheters are in proper position, draining the left   kidney. 2. Catheter draining the left kidney in proper position. 3. Some degree of anasarca and discrete ascites. 4. Pattern of constipation. 5. Bilateral adrenal mass which can further increased size since   previous examination of December 2018. Metastasis to be considered. Please correlate clinically. 6. Midline retroperitoneal adenopathy. IR GUIDED URETERAL STENT PLACE THRU EXIST TRACT    (Results Pending)   IR INTERVENTIONAL RADIOLOGY PROCEDURE REQUEST    (Results Pending)         URINARY CATHETER OUTPUT (Estrella):  [REMOVED] Nephrostomy Right-Output (mL): 200 mL  Nephrostomy 1 Right 8 fr-Output (mL): 400 mL    Assessment and Plan:         1.  Complicated UTI (UA from clean catch, not nephrostomy bag)  2 history of cervical cancer status post chemo and radiation therapy  3 history of bilateral urinary stents/right-sided nephrostomy tube in place  4 status post IR guided right stent exchange on 5/1/2019     Plan  - Continue with Zosyn for now  - Follow urine cultures  - blood cultures negative so far  - ?concern for stent/tube blockage, urology plan for possible nephrostogram, discussed with Dr Ryanne Lynn            Electronically signed by Rosendo Guerra MD on 5/5/2019 at 2:02 PM

## 2019-05-05 NOTE — PLAN OF CARE
Problem: Falls - Risk of:  Goal: Will remain free from falls  Description  Will remain free from falls  5/5/2019 0546 by Raquel Wharton RN  Outcome: Met This Shift  5/4/2019 2215 by Sandra Morgan RN  Outcome: Met This Shift     Problem: Pain:  Goal: Pain level will decrease  Description  Pain level will decrease  Outcome: Met This Shift

## 2019-05-05 NOTE — PROGRESS NOTES
[facility] PROGRESS NOTE                                                                       5/5/2019        CHIEF UROLOGIC COMPLAINT: Bilateral hydronephrosis    HISTORY OF PRESENT ILLNESS:  Patient without new complaints. Having flank pain. No fevers or chills. Poor appetite. REVIEW OF SYSTEMS:   CONSTITUTIONAL: negative  HEENT: negative  HEMATOLOGIC: negative  ENDOCRINE: negative  RESPIRATORY: negative  CV: negative  GI: negative  NEURO: negative  ORTHOPEDICS: negative  PSYCHIATRIC: negative  : as above    PAST FAMILY HISTORY:  History reviewed. No pertinent family history. PAST SOCIAL HISTORY:    Social History     Socioeconomic History    Marital status:       Spouse name: None    Number of children: None    Years of education: None    Highest education level: None   Occupational History    None   Social Needs    Financial resource strain: None    Food insecurity:     Worry: None     Inability: None    Transportation needs:     Medical: None     Non-medical: None   Tobacco Use    Smoking status: Current Every Day Smoker     Packs/day: 0.25     Years: 40.00     Pack years: 10.00     Types: Cigarettes    Smokeless tobacco: Never Used   Substance and Sexual Activity    Alcohol use: No     Comment: 1 x a month    Drug use: Yes     Types: Marijuana     Comment: couple times a month    Sexual activity: None   Lifestyle    Physical activity:     Days per week: None     Minutes per session: None    Stress: None   Relationships    Social connections:     Talks on phone: None     Gets together: None     Attends Yarsani service: None     Active member of club or organization: None     Attends meetings of clubs or organizations: None     Relationship status: None    Intimate partner violence:     Fear of current or ex partner: None     Emotionally abused: None Physically abused: None     Forced sexual activity: None   Other Topics Concern    None   Social History Narrative    None       Scheduled Meds:   metoclopramide  10 mg Oral 4x Daily AC & HS    sennosides-docusate sodium  2 tablet Oral BID    gabapentin  600 mg Oral TID    heparin flush  100 Units Intracatheter Daily    piperacillin-tazobactam  3.375 g Intravenous Q8H    sodium chloride flush  10 mL Intravenous 2 times per day    enoxaparin  40 mg Subcutaneous Daily    amLODIPine  5 mg Oral Daily    fentaNYL  1 patch Transdermal Q72H    oxybutynin  5 mg Oral Daily    sertraline  50 mg Oral Daily     Continuous Infusions:   sodium chloride 50 mL/hr at 05/04/19 1048     PRN Meds:.albuterol, HYDROcodone-acetaminophen, acetaminophen, sodium chloride flush, morphine, nicotine polacrilex, heparin flush, sodium chloride flush, magnesium hydroxide, ondansetron, opium-belladonna, methadone    /61   Pulse 72   Temp 96.9 °F (36.1 °C) (Temporal)   Resp 16   Ht 5' 3\" (1.6 m)   Wt 102 lb (46.3 kg)   SpO2 100%   BMI 18.07 kg/m²     Lab Results   Component Value Date    WBC 6.6 05/05/2019    HGB 10.2 (L) 05/05/2019    HCT 33.0 (L) 05/05/2019    MCV 99.1 05/05/2019     05/05/2019       Lab Results   Component Value Date    CREATININE 1.5 (H) 05/05/2019         PHYSICAL EXAMINATION:  Skin dry, without rashes  Respirations non-labored, intact  Abdomen soft, non-tender, non-distended  Alert and oriented x3      ASSESSMENT AND PLAN:  Advanced cervical cancer with bilateral hydronephrosis now with bilateral internal stents and a clamped right nephrostomy tube  -Need to unclamp due given pain  -if pain resolved will need to keep PNT in place  UTI - managed by ID  Bilateral adrenal mass   Cr stable at 1.5     Electronically signed by Pb Hough MD on 5/5/2019 at 8:16 AM

## 2019-05-05 NOTE — PROGRESS NOTES
Hospitalist Progress Note      Synopsis: Patient admitted on 4/29/2019  58 y.o. female PMH of COPD, HTN, HLD, advanced cervical cancer on chemo next RX may 15, presenting to the ED for abdominal pain, beginning today. Ly Brandon also complains of problem with her right nephrostomy tube as the dressing is falling off.  She reports history of COPD reports her breathing is at her baseline. She has fever chills, no vomiting but has nausea no changes in her pulmo status, on RA, in ER found to have uti, lab normal WBC, hb 10.7, UA grossly abnormal, ctap showed left ureteric cath in proper position, BL adrenal mass increased in size since December 2018    >> admitted for abdominal pain / R nephrostomy tube dislogement  >> Uro / ID consultation - IR to place stent and remove NT  >> Urology to remove NT Monday. Subjective    Pain complaints. Exam:  /61   Pulse 72   Temp 96.9 °F (36.1 °C) (Temporal)   Resp 16   Ht 5' 3\" (1.6 m)   Wt 102 lb (46.3 kg)   SpO2 100%   BMI 18.07 kg/m²   General appearance: No apparent distress, Chronically ill appearing. HEENT: Pupils equal, round, and reactive to light. Conjunctivae/corneas clear. Neck: Supple. No jugular venous distention. Trachea midline. Respiratory:  Normal respiratory effort. Clear to auscultation, bilaterally without Rales/Wheezes/Rhonchi. Cardiovascular: Regular rate and rhythm with normal S1/S2 without murmurs, rubs or gallops. Abdomen: Soft, non-tender, non-distended with normal bowel sounds. Musculoskeletal: No clubbing, cyanosis. + Trace edema bilaterally. Brisk capillary refill. 2+ lower extremity pulses (dorsalis pedis).    Skin:  No rashes    Neurologic: awake, alert and following commands     Medications:  Reviewed    Infusion Medications    sodium chloride 50 mL/hr at 05/04/19 1048     Scheduled Medications    metoclopramide  10 mg Oral 4x Daily AC & HS    sennosides-docusate sodium  2 tablet Oral BID    gabapentin  600 mg Oral TID    following  -- Bengay to knees bilateral      Diet: DIET GENERAL;  Diet NPO Time Specified Exceptions are: Sips with Meds  Dietary Nutrition Supplements: Frozen Oral Supplement  Code Status: Full Code    +++++++++++++++++++++++++++++++++++++++++++++++++  Gogo Rivera MD   [facility].  +++++++++++++++++++++++++++++++++++++++++++++++++  NOTE: This report was transcribed using voice recognition software. Every effort was made to ensure accuracy; however, inadvertent computerized transcription errors may be present.

## 2019-05-06 VITALS
WEIGHT: 103.9 LBS | OXYGEN SATURATION: 97 % | HEIGHT: 63 IN | DIASTOLIC BLOOD PRESSURE: 70 MMHG | SYSTOLIC BLOOD PRESSURE: 113 MMHG | TEMPERATURE: 98.6 F | RESPIRATION RATE: 18 BRPM | HEART RATE: 84 BPM | BODY MASS INDEX: 18.41 KG/M2

## 2019-05-06 LAB
ANION GAP SERPL CALCULATED.3IONS-SCNC: 13 MMOL/L (ref 7–16)
BUN BLDV-MCNC: 18 MG/DL (ref 8–23)
C DIFFICILE TOXIN, EIA: NORMAL
CALCIUM SERPL-MCNC: 8.7 MG/DL (ref 8.6–10.2)
CHLORIDE BLD-SCNC: 102 MMOL/L (ref 98–107)
CO2: 23 MMOL/L (ref 22–29)
CREAT SERPL-MCNC: 1.4 MG/DL (ref 0.5–1)
GFR AFRICAN AMERICAN: 46
GFR NON-AFRICAN AMERICAN: 38 ML/MIN/1.73
GLUCOSE BLD-MCNC: 111 MG/DL (ref 74–99)
HCT VFR BLD CALC: 26.7 % (ref 34–48)
HEMOGLOBIN: 8.4 G/DL (ref 11.5–15.5)
MCH RBC QN AUTO: 30.4 PG (ref 26–35)
MCHC RBC AUTO-ENTMCNC: 31.5 % (ref 32–34.5)
MCV RBC AUTO: 96.7 FL (ref 80–99.9)
PDW BLD-RTO: 16.2 FL (ref 11.5–15)
PLATELET # BLD: 295 E9/L (ref 130–450)
PMV BLD AUTO: 8.9 FL (ref 7–12)
POTASSIUM REFLEX MAGNESIUM: 4.4 MMOL/L (ref 3.5–5)
RBC # BLD: 2.76 E12/L (ref 3.5–5.5)
SODIUM BLD-SCNC: 138 MMOL/L (ref 132–146)
WBC # BLD: 6.2 E9/L (ref 4.5–11.5)

## 2019-05-06 PROCEDURE — 2580000003 HC RX 258: Performed by: INTERNAL MEDICINE

## 2019-05-06 PROCEDURE — 94640 AIRWAY INHALATION TREATMENT: CPT

## 2019-05-06 PROCEDURE — 6360000002 HC RX W HCPCS: Performed by: INTERNAL MEDICINE

## 2019-05-06 PROCEDURE — 85027 COMPLETE CBC AUTOMATED: CPT

## 2019-05-06 PROCEDURE — 6370000000 HC RX 637 (ALT 250 FOR IP): Performed by: HOSPITALIST

## 2019-05-06 PROCEDURE — 80048 BASIC METABOLIC PNL TOTAL CA: CPT

## 2019-05-06 PROCEDURE — 6360000002 HC RX W HCPCS: Performed by: CLINICAL NURSE SPECIALIST

## 2019-05-06 PROCEDURE — 6370000000 HC RX 637 (ALT 250 FOR IP): Performed by: CLINICAL NURSE SPECIALIST

## 2019-05-06 PROCEDURE — 97530 THERAPEUTIC ACTIVITIES: CPT

## 2019-05-06 PROCEDURE — 2580000003 HC RX 258: Performed by: FAMILY MEDICINE

## 2019-05-06 PROCEDURE — 6370000000 HC RX 637 (ALT 250 FOR IP): Performed by: FAMILY MEDICINE

## 2019-05-06 PROCEDURE — 6360000002 HC RX W HCPCS: Performed by: HOSPITALIST

## 2019-05-06 PROCEDURE — 36415 COLL VENOUS BLD VENIPUNCTURE: CPT

## 2019-05-06 PROCEDURE — 87324 CLOSTRIDIUM AG IA: CPT

## 2019-05-06 PROCEDURE — 6370000000 HC RX 637 (ALT 250 FOR IP): Performed by: PHYSICIAN ASSISTANT

## 2019-05-06 RX ORDER — CIPROFLOXACIN 500 MG/1
500 TABLET, FILM COATED ORAL EVERY OTHER DAY
Qty: 3 TABLET | Refills: 0 | Status: SHIPPED | OUTPATIENT
Start: 2019-05-06 | End: 2019-05-12

## 2019-05-06 RX ADMIN — LOPERAMIDE HYDROCHLORIDE 4 MG: 2 CAPSULE ORAL at 08:42

## 2019-05-06 RX ADMIN — ALBUTEROL SULFATE 2.5 MG: 2.5 SOLUTION RESPIRATORY (INHALATION) at 02:03

## 2019-05-06 RX ADMIN — HYDROCODONE BITARTRATE AND ACETAMINOPHEN 1 TABLET: 10; 325 TABLET ORAL at 00:16

## 2019-05-06 RX ADMIN — MORPHINE SULFATE 2 MG: 2 INJECTION, SOLUTION INTRAMUSCULAR; INTRAVENOUS at 02:38

## 2019-05-06 RX ADMIN — METOCLOPRAMIDE 10 MG: 10 TABLET ORAL at 06:38

## 2019-05-06 RX ADMIN — SERTRALINE 50 MG: 50 TABLET, FILM COATED ORAL at 08:42

## 2019-05-06 RX ADMIN — METOCLOPRAMIDE 10 MG: 10 TABLET ORAL at 11:40

## 2019-05-06 RX ADMIN — ONDANSETRON HYDROCHLORIDE 4 MG: 2 SOLUTION INTRAMUSCULAR; INTRAVENOUS at 08:42

## 2019-05-06 RX ADMIN — HYDROCODONE BITARTRATE AND ACETAMINOPHEN 1 TABLET: 10; 325 TABLET ORAL at 12:22

## 2019-05-06 RX ADMIN — ALBUTEROL SULFATE 2.5 MG: 2.5 SOLUTION RESPIRATORY (INHALATION) at 09:34

## 2019-05-06 RX ADMIN — OXYBUTYNIN CHLORIDE 5 MG: 5 TABLET ORAL at 08:42

## 2019-05-06 RX ADMIN — SODIUM CHLORIDE: 9 INJECTION, SOLUTION INTRAVENOUS at 00:17

## 2019-05-06 RX ADMIN — AMLODIPINE BESYLATE 5 MG: 5 TABLET ORAL at 08:42

## 2019-05-06 RX ADMIN — MORPHINE SULFATE 2 MG: 2 INJECTION, SOLUTION INTRAMUSCULAR; INTRAVENOUS at 06:38

## 2019-05-06 RX ADMIN — HYDROCODONE BITARTRATE AND ACETAMINOPHEN 1 TABLET: 10; 325 TABLET ORAL at 04:46

## 2019-05-06 RX ADMIN — GABAPENTIN 600 MG: 300 CAPSULE ORAL at 08:42

## 2019-05-06 RX ADMIN — PIPERACILLIN AND TAZOBACTAM 3.38 G: 3; .375 INJECTION, POWDER, FOR SOLUTION INTRAVENOUS at 04:46

## 2019-05-06 ASSESSMENT — PAIN DESCRIPTION - LOCATION
LOCATION: LEG

## 2019-05-06 ASSESSMENT — PAIN DESCRIPTION - FREQUENCY: FREQUENCY: CONTINUOUS

## 2019-05-06 ASSESSMENT — PAIN DESCRIPTION - PAIN TYPE
TYPE: ACUTE PAIN

## 2019-05-06 ASSESSMENT — PAIN DESCRIPTION - DESCRIPTORS
DESCRIPTORS: ACHING;CONSTANT;DISCOMFORT
DESCRIPTORS: ACHING;CONSTANT;DISCOMFORT
DESCRIPTORS: ACHING;DISCOMFORT;CONSTANT

## 2019-05-06 ASSESSMENT — PAIN SCALES - GENERAL
PAINLEVEL_OUTOF10: 10

## 2019-05-06 ASSESSMENT — PAIN DESCRIPTION - ORIENTATION
ORIENTATION: RIGHT;LEFT

## 2019-05-06 NOTE — PROGRESS NOTES
Pt found to have disconnected herself from her iv and her iv med running and left it on and left unit.

## 2019-05-06 NOTE — PROGRESS NOTES
All discharge instructions reviewed with patient at bedside. Patient verbalizes understanding of all medications and importance of follow up appointments. Patient's port de-accessed. Patient requesting pain medication script at discharge. Perfect serve message sent to Dr. Konstantin Christy regarding pain script.   He stated patient will have to get from her oncologist.

## 2019-05-06 NOTE — PROGRESS NOTES
Physical Therapy  Facility/Department: [facility] MED SURG ONC  Daily Treatment Note  NAME: Vik Camarillo  : 1960  MRN: [id number]    Date of Service: 2019  Evaluating Therapist: Lakia Munoz PT, DPT     Room #: 6349 B   DIAGNOSIS: UTI  PRECAUTIONS: Falls, Neutropenic  PMHx: COPD, HLD, HTN, Squamous cell carcinoma   PROCEDURES: NA     Social:  Pt lives with grandson (16yo) and great grandchild in a 2 floor plan, 1st floor setup with 4 step(s) and 1 rail(s) to enter. Prior to admission pt walked with rollator and was Independent.                       Initial Evaluation  Date: 19 Treatment  Date:    19 Short Term/ Long Term   Goals   AM-PAC 6 Clicks 63/49       Does pt have pain? Pt c/o overall pain - chronic  chronic pain     Bed Mobility  Rolling: NT  Supine to sit: Mod Independent  Sit to supine: NT  Scooting: Independent  mod I Independent   Transfers Sit to stand: SBA  Stand to sit: SBA  Stand pivot: SBA with [employer]  mod I Mod Independent with [employer]   Ambulation   100 feet x 2 reps with [employer]  200 ftx2 w/ ww   Mod I  >400 feet with Mod Independent with [employer]   Stair negotiation: ascended and descended NT  NT pt asked to return to room to use B.R (diarrhea )  >4 steps with 1 rail with Mod Independent       Pt is alert and oriented x 3    Balance: good     Patient education  Pt was educated on safety w/ functional mobiltiy     Patient response to education:   Pt verbalized understanding Pt demonstrated skill Pt requires further education in this area   x x x     Additional Comments: pt performed all functional mobility noted , safely. Perseverative on going outside to smoke, therapist and nurse attempting to educate, and redirect pt , expressing to pt this is not advised nor good for her in her recovery     Pt was left standing in room w/ nursing present with call light left by patient.     Chair/bed alarm:NA     Time in: 1004  Time out: 1015      Continue with physical therapy current plan of nila.    More [facility] PTA [id number]

## 2019-05-06 NOTE — PROGRESS NOTES
ID Progress Note                [address] [facility] [address], [place], [address]            Phone [phone]     Fax [phone]      Chief complaint   abdo pain/fever    Subjective:  abdo pain better controlled but couldn't tolerate nephrostomy tube clamping earlier  Nephrostomy tube has been unclamped  The patient is awake and alert. Tolerating medications. Reports no side effects. Afebrile. 10 ROS otherwise negative unless otherwise specified above. Objective:    Vitals:    05/06/19 0815   BP: 113/70   Pulse: 84   Resp: 18   Temp: 98.6 °F (37 °C)   SpO2: 97%     General appearance: Alert, Awake, Oriented times 3, no distress  Skin: Warm and dry  Eyes: Pink palpebral conjunctivae. PERRL  Ears: External ears normal.  Nose/Sinuses: Nares normal. Septum midline. Mucosa normal. No sinus tenderness. Oropharynx: Oropharynx clear with no exudates seen  Neck: Neck supple. No jugular venous distension, lymphadenopathy or thyromegaly Trachea midline  Lungs: Lungs clear to auscultation bilaterally. No rhonchi, crackles or wheezes  Heart: S1 S2  Regular rate and rhythm. No rub, murmur or gallop  Abdomen: Abdomen soft, non-tender. BS normal. No masses, organomegaly  Extremities: No edema, Peripheral pulses palpable  Musculoskeletal: Muscular strength appears intact.  No joint effusion, tenderness, swelling or warmth        Labs:  Recent Labs     05/04/19  0525 05/05/19  0708 05/06/19  0500   WBC 6.4 6.6 6.2   RBC 3.04* 3.33* 2.76*   HGB 9.4* 10.2* 8.4*   HCT 29.6* 33.0* 26.7*   MCV 97.4 99.1 96.7   MCH 30.9 30.6 30.4   MCHC 31.8* 30.9* 31.5*   RDW 16.2* 16.3* 16.2*    353 295   MPV 9.4 9.3 8.9     CMP:    Lab Results   Component Value Date     05/06/2019    K 4.4 05/06/2019     05/06/2019    CO2 23 05/06/2019    BUN 18 05/06/2019    CREATININE 1.4 05/06/2019    GFRAA 46 05/06/2019    LABGLOM 38 05/06/2019    GLUCOSE 111 05/06/2019    PROT 6.4 04/29/2019    LABALBU 3.3 04/29/2019 CALCIUM 8.7 05/06/2019    BILITOT <0.2 04/29/2019    ALKPHOS 65 04/29/2019    AST 8 04/29/2019    ALT <5 04/29/2019          Microbiology :  No results for input(s): BC in the last 72 hours. No results for input(s): Speedy Cruzton in the last 72 hours. No results for input(s): LABURIN in the last 72 hours. No results for input(s): CULTRESP in the last 72 hours. No results for input(s): WNDABS in the last 72 hours. Radiology :  XR ABDOMEN (KUB) (SINGLE AP VIEW)   Final Result      NON-SPECIFIC GAS PATTERN      There is bilateral ureteral stents in place as described above. US DUP LOWER EXTREMITY LEFT JANE   Final Result   No evidence of left lower extremity deep venous thrombosis. CT ABDOMEN PELVIS WO CONTRAST   Final Result      1. Left ureteric catheters are in proper position, draining the left   kidney. 2. Catheter draining the left kidney in proper position. 3. Some degree of anasarca and discrete ascites. 4. Pattern of constipation. 5. Bilateral adrenal mass which can further increased size since   previous examination of December 2018. Metastasis to be considered. Please correlate clinically. 6. Midline retroperitoneal adenopathy. IR GUIDED URETERAL STENT PLACE THRU EXIST TRACT    (Results Pending)         URINARY CATHETER OUTPUT (Estrella):  [REMOVED] Nephrostomy Right-Output (mL): 200 mL  Nephrostomy 1 Right 8 fr-Output (mL): 500 mL    Assessment and Plan:         1.  Complicated UTI (UA from clean catch, not nephrostomy bag)  2 history of cervical cancer status post chemo and radiation therapy  3 history of bilateral urinary stents/right-sided nephrostomy tube in place  4 status post IR guided right stent exchange on 5/1/2019     Plan  - Continue with Zosyn for now, D/C on cipro 500mg every other day for 6 more days  - Follow urine cultures- mixed growth  - blood cultures negative so far  - ?concern for stent/tube blockage, urology plan for possible nephrostogram, discussed with Dr Jose Zimmer- now possibly outpt            Electronically signed by Talib Moser MD on 5/6/2019 at 11:24 AM

## 2019-05-06 NOTE — CARE COORDINATION
[facility] does not take Pt's insurance. Pt is going to check with her primary Dr for coverage.

## 2019-05-06 NOTE — PLAN OF CARE
Problem: Falls - Risk of:  Goal: Will remain free from falls  Description  Will remain free from falls  Outcome: Met This Shift  Goal: Absence of physical injury  Description  Absence of physical injury  Outcome: Met This Shift     Problem: Musculor/Skeletal Functional Status  Goal: Absence of falls  Outcome: Met This Shift

## 2019-05-06 NOTE — PROGRESS NOTES
Perfect serve message sent to Dr. Delores Padilla re oncology consult. Appears that consult was not completed as there are no notes from oncology. Consult cancelled per Dr. Delores Padilla.

## 2019-05-06 NOTE — CARE COORDINATION
[facility] informed SW that Pt was not being accepted back due to noncompliance with care. SW notified Pt and Pt choice was [facility]. Referral made to [facility]. Checking Insurance and will call back.

## 2019-05-06 NOTE — PROGRESS NOTES
5/6/2019 8:06 AM  Service: Urology  Group: [facility] (Rj/Shira/Arnol)    Abrahan Meléndez  [id number]    Subjective:  TMax 99.2  Pt states flank pain somewhat relieved with unclamping of nephrostomy tube  Nephrostomy tube draining clear yellow urine  Pt requesting diet      Review of Systems  Constitutional: No sweats or chills  Respiratory: negative for cough and shortness of breath  Cardiovascular: negative for chest pain and palpitations  Gastrointestinal: positive for abdominal pain and nausea however wants to eat  Dermatologic: no rash or itching  Hematologic/lymphatic: negative  Musculoskeletal:negative  Neurological: negative  Endocrine: negative  Psychiatric: negative  : See above    Scheduled Meds:   metoclopramide  10 mg Oral 4x Daily AC & HS    sennosides-docusate sodium  2 tablet Oral BID    gabapentin  600 mg Oral TID    heparin flush  100 Units Intracatheter Daily    piperacillin-tazobactam  3.375 g Intravenous Q8H    sodium chloride flush  10 mL Intravenous 2 times per day    enoxaparin  40 mg Subcutaneous Daily    amLODIPine  5 mg Oral Daily    fentaNYL  1 patch Transdermal Q72H    oxybutynin  5 mg Oral Daily    sertraline  50 mg Oral Daily       Objective:  Vitals:    05/06/19 0016   BP: (!) 147/98   Pulse: 98   Resp: 16   Temp: 98.4 °F (36.9 °C)   SpO2: 98%         Allergies: Latex; Asa [aspirin];  Other; and Contrast [barium-containing compounds]    General Appearance: alert and oriented to person, place and time and in no acute distress  Skin: no rash or erythema  Head: normocephalic and atraumatic  Pulmonary/Chest:  normal air movement, no respiratory distress  Abdomen: soft, non-tender, non-distended  Labs:     Recent Labs     05/06/19  0500      K 4.4      CO2 23   BUN 18   CREATININE 1.4*   GLUCOSE 111*   CALCIUM 8.7       Lab Results   Component Value Date    HGB 8.4 05/06/2019    HCT 26.7 05/06/2019         Assessment/Plan:  Bilateral hydronephrosis now with bilateral internal stents, advanced cervical cancer  Nephrostomy tube was unclamped yesterday due to flank pain. Pt feeling somewhat better  UTI managed by ID  Creatinine 1.4 today  Pt did not tolerate clamping of nephrostomy tube at this time  Recommend discharge with nephrostomy tube open when ok with ID. To follow up with Dr. Roxanna Luis who is pt's local as outpt for further management. Kassandra Sorensen, APRN - CNP   [facility]    Addend  Pt instructed how to clamp nephrostomy tube to have another trial for tube removal.  She will follow up with Dr. Sabrina Umana as instructed.

## 2019-05-06 NOTE — DISCHARGE SUMMARY
Hospitalist Discharge Summary    Patient ID: Jurgen Colon   Patient : 1960  Patient's PCP: Andrea English MD    Admit Date: 2019   Admitting Physician: Carin Redmond MD    Discharge Date:  2019  Discharge Physician: Lai Rhodes MD   Discharge Condition: Stable  [facility] in brief:  (Please refer to daily progress notes for a comprehensive review of the hospitalization by requesting medical records)    59 y.o. female PMH of COPD, HTN, HLD, advanced cervical cancer on chemo next RX may 15, presenting to the ED for abdominal pain, beginning today. Bernice Lady also complains of problem with her right nephrostomy tube as the dressing is falling off.  She reports history of COPD reports her breathing is at her baseline. She has fever chills, no vomiting but has nausea no changes in her pulmo status, on RA, in ER found to have uti, lab normal WBC, hb 10.7, UA grossly abnormal, ctap showed left ureteric cath in proper position, BL adrenal mass increased in size since 2018     >> Admitted for abdominal pain / R nephrostomy tube dislogement  >> Uro / ID consultation for antibiotic and nephrostomy tube management. Did not tolerate clamping of nephrostomy tube. Okay to discharge with nephrostomy tube open and follow-up with Dr. Annie Keyes for further management. IV Zosyn during hospitalization. >> IR: On 2019 Right ureteral stent placement. Exchange of right nephrostomy catheter.   >> Discharged home with home health > OP follow up with Urologist recommended    Consults:   IP CONSULT TO HOSPITALIST  IP CONSULT TO INFECTIOUS DISEASES  IP CONSULT TO UROLOGY  IP CONSULT TO DIETITIAN  IP CONSULT TO PALLIATIVE CARE    Discharge Diagnoses:    Acute Complicated cystitis  Right nephrostomy tube dislogement  Advanced cervical cancer with metastatic disease  COPD - stable  Hypertension - stable  Hyperlipidemia - on statin  CKD 3  Lactic acodosis - draining the right kidney. The catheter is in proper position. There are some fullness of the collecting system of both kidneys but also kidneys or in the compressed by the catheters. There is a large mass lesion in the left adrenal gland which has increased size since the previous examination presently measures 4.5 x 2.8 cm previously it measured 2.6 x 2.2 cm there are. There is also enlargement of the right adrenal gland more prominent since the previous study. Also as observed previously there is midline retroperitoneal adenopathy with several enlarged lymph nodes around the aorta and IVC above and below the hyoid the kidneys. The liver has normal size and. The liver has a diffuse increased density up to 75 Hounsfield units are. This can be manifestation of a deposit disease as seen with hemachromatosis. Please correlate clinically. Gallbladder is nondistended. The intrahepatic biliary tree does not appears to be dilated. The pancreas is more difficult to be identified in the bladder appears otherwise unremarkable. The spleen has normal size. There is a pattern of constipation with foramina multiple fecal content throughout the entire colon. Some degree of edema is seen in the retroperitoneal space particularly the presacral area. Minimal ascites seen in the abdomen particularly just underneath of the liver and spleen and also a small amount in the pelvic area. Aorta demonstrates some mild atherosclerotic changes. No free intraperitoneal air is seen. Lower lung bases demonstrate no significant findings are. Patient has a previous pinning for a now old fracture of the right femoral head. The bones otherwise appear unremarkable. 1. Left ureteric catheters are in proper position, draining the left kidney. 2. Catheter draining the left kidney in proper position. 3. Some degree of anasarca and discrete ascites. 4. Pattern of constipation.  5. Bilateral adrenal mass which can further increased size since previous Right    Result Date: 2019  Patient MRN:  [id number] : 1960 Age: 59 years Gender: Female Order Date:  2019 10:53 AM EXAM: XR HIP 2-3 VW W PELVIS RIGHT NUMBER OF IMAGES:  3 views INDICATION: S72.001A Displaced fracture of right femoral neck ([address] For Pelvis center over pubis symphysis right hip fx COMPARISON: Right hip x-ray 2019 . Alignment remains unchanged. No foreign body is identified. The patient is status post fixation right hip fracture with 3 compression screws. Fracture lines are less visible on today's study. There is a tubular structure projecting over the hip and mid pelvis not previously seen on prior study. Distal segment of a ureteral stent on the left is visible. Impresion: No significant change in alignment right hip fracture fixation The exam has been dictated and signed by Tonya Dee PA-C. Bisi Antony MD , Radiologist, have reviewed the images and report and concur with these findings. Discharge Medications:      Medication List      CHANGE how you take these medications    metoclopramide 5 MG tablet  Commonly known as:  REGLAN  What changed:  Another medication with the same name was removed. Continue taking this medication, and follow the directions you see here. CONTINUE taking these medications    acetaminophen 325 MG tablet  Commonly known as:  TYLENOL     amLODIPine 5 MG tablet  Commonly known as:  NORVASC  Take 1 tablet by mouth daily     docusate sodium 100 MG capsule  Commonly known as:  COLACE     fentaNYL 100 MCG/HR  Commonly known as:  DURAGESIC     gabapentin 600 MG tablet  Commonly known as:  NEURONTIN     HYDROcodone-acetaminophen 5-325 MG per tablet  Commonly known as:  NORCO     methadone 10 MG tablet  Commonly known as:  DOLOPHINE     opium-belladonna 16.2-60 MG suppository  Commonly known as:  B&O SUPPRETTES  Place 1 suppository rectally 2 times daily as needed for Pain for up to 7 days. Lord Miguel      oxybutynin 5 MG tablet  Commonly known

## 2019-05-11 ENCOUNTER — APPOINTMENT (OUTPATIENT)
Dept: GENERAL RADIOLOGY | Age: 59
End: 2019-05-11
Payer: OTHER GOVERNMENT

## 2019-05-11 ENCOUNTER — HOSPITAL ENCOUNTER (EMERGENCY)
Age: 59
Discharge: HOME | End: 2019-05-11
Attending: EMERGENCY MEDICINE
Payer: OTHER GOVERNMENT

## 2019-05-11 VITALS
DIASTOLIC BLOOD PRESSURE: 74 MMHG | TEMPERATURE: 97.5 F | OXYGEN SATURATION: 98 % | BODY MASS INDEX: 18.41 KG/M2 | RESPIRATION RATE: 18 BRPM | HEART RATE: 85 BPM | SYSTOLIC BLOOD PRESSURE: 116 MMHG | WEIGHT: 103.9 LBS | HEIGHT: 63 IN

## 2019-05-11 DIAGNOSIS — C55 MALIGNANT NEOPLASM OF UTERUS, UNSPECIFIED SITE (HCC): ICD-10-CM

## 2019-05-11 DIAGNOSIS — G89.3 CANCER RELATED PAIN: ICD-10-CM

## 2019-05-11 DIAGNOSIS — C79.9 METASTATIC CANCER (HCC): Primary | ICD-10-CM

## 2019-05-11 LAB
ALBUMIN SERPL-MCNC: 3.3 G/DL (ref 3.5–5.2)
ALP BLD-CCNC: 82 U/L (ref 35–104)
ALT SERPL-CCNC: 6 U/L (ref 0–32)
ANION GAP SERPL CALCULATED.3IONS-SCNC: 9 MMOL/L (ref 7–16)
AST SERPL-CCNC: 9 U/L (ref 0–31)
BASOPHILS ABSOLUTE: 0.04 E9/L (ref 0–0.2)
BASOPHILS RELATIVE PERCENT: 0.7 % (ref 0–2)
BILIRUB SERPL-MCNC: <0.2 MG/DL (ref 0–1.2)
BUN BLDV-MCNC: 12 MG/DL (ref 8–23)
CALCIUM SERPL-MCNC: 8.5 MG/DL (ref 8.6–10.2)
CHLORIDE BLD-SCNC: 102 MMOL/L (ref 98–107)
CO2: 24 MMOL/L (ref 22–29)
CREAT SERPL-MCNC: 1.4 MG/DL (ref 0.5–1)
EOSINOPHILS ABSOLUTE: 0.25 E9/L (ref 0.05–0.5)
EOSINOPHILS RELATIVE PERCENT: 4.4 % (ref 0–6)
GFR AFRICAN AMERICAN: 46
GFR NON-AFRICAN AMERICAN: 38 ML/MIN/1.73
GLUCOSE BLD-MCNC: 85 MG/DL (ref 74–99)
HCT VFR BLD CALC: 31 % (ref 34–48)
HEMOGLOBIN: 10.3 G/DL (ref 11.5–15.5)
IMMATURE GRANULOCYTES #: 0.03 E9/L
IMMATURE GRANULOCYTES %: 0.5 % (ref 0–5)
LACTIC ACID: 0.8 MMOL/L (ref 0.5–2.2)
LYMPHOCYTES ABSOLUTE: 0.66 E9/L (ref 1.5–4)
LYMPHOCYTES RELATIVE PERCENT: 11.5 % (ref 20–42)
MAGNESIUM: 2 MG/DL (ref 1.6–2.6)
MCH RBC QN AUTO: 31.1 PG (ref 26–35)
MCHC RBC AUTO-ENTMCNC: 33.2 % (ref 32–34.5)
MCV RBC AUTO: 93.7 FL (ref 80–99.9)
MONOCYTES ABSOLUTE: 0.56 E9/L (ref 0.1–0.95)
MONOCYTES RELATIVE PERCENT: 9.8 % (ref 2–12)
NEUTROPHILS ABSOLUTE: 4.19 E9/L (ref 1.8–7.3)
NEUTROPHILS RELATIVE PERCENT: 73.1 % (ref 43–80)
PDW BLD-RTO: 16 FL (ref 11.5–15)
PLATELET # BLD: 395 E9/L (ref 130–450)
PMV BLD AUTO: 8.5 FL (ref 7–12)
POTASSIUM SERPL-SCNC: 3.3 MMOL/L (ref 3.5–5)
RBC # BLD: 3.31 E12/L (ref 3.5–5.5)
SODIUM BLD-SCNC: 135 MMOL/L (ref 132–146)
TOTAL PROTEIN: 6.3 G/DL (ref 6.4–8.3)
TROPONIN: 0.12 NG/ML (ref 0–0.03)
WBC # BLD: 5.7 E9/L (ref 4.5–11.5)

## 2019-05-11 PROCEDURE — 96374 THER/PROPH/DIAG INJ IV PUSH: CPT

## 2019-05-11 PROCEDURE — 99283 EMERGENCY DEPT VISIT LOW MDM: CPT

## 2019-05-11 PROCEDURE — 36415 COLL VENOUS BLD VENIPUNCTURE: CPT

## 2019-05-11 PROCEDURE — 84484 ASSAY OF TROPONIN QUANT: CPT

## 2019-05-11 PROCEDURE — 71045 X-RAY EXAM CHEST 1 VIEW: CPT

## 2019-05-11 PROCEDURE — 93005 ELECTROCARDIOGRAM TRACING: CPT | Performed by: PHYSICIAN ASSISTANT

## 2019-05-11 PROCEDURE — 6370000000 HC RX 637 (ALT 250 FOR IP): Performed by: PHYSICIAN ASSISTANT

## 2019-05-11 PROCEDURE — 85025 COMPLETE CBC W/AUTO DIFF WBC: CPT

## 2019-05-11 PROCEDURE — 2580000003 HC RX 258: Performed by: PHYSICIAN ASSISTANT

## 2019-05-11 PROCEDURE — 96375 TX/PRO/DX INJ NEW DRUG ADDON: CPT

## 2019-05-11 PROCEDURE — 83605 ASSAY OF LACTIC ACID: CPT

## 2019-05-11 PROCEDURE — 6360000002 HC RX W HCPCS: Performed by: PHYSICIAN ASSISTANT

## 2019-05-11 PROCEDURE — 96376 TX/PRO/DX INJ SAME DRUG ADON: CPT

## 2019-05-11 PROCEDURE — 83735 ASSAY OF MAGNESIUM: CPT

## 2019-05-11 PROCEDURE — 80053 COMPREHEN METABOLIC PANEL: CPT

## 2019-05-11 RX ORDER — POTASSIUM CHLORIDE 20 MEQ/1
40 TABLET, EXTENDED RELEASE ORAL ONCE
Status: COMPLETED | OUTPATIENT
Start: 2019-05-11 | End: 2019-05-11

## 2019-05-11 RX ORDER — ONDANSETRON 2 MG/ML
4 INJECTION INTRAMUSCULAR; INTRAVENOUS ONCE
Status: COMPLETED | OUTPATIENT
Start: 2019-05-11 | End: 2019-05-11

## 2019-05-11 RX ORDER — HEPARIN SODIUM (PORCINE) LOCK FLUSH IV SOLN 100 UNIT/ML 100 UNIT/ML
SOLUTION INTRAVENOUS
Status: DISCONTINUED
Start: 2019-05-11 | End: 2019-05-11 | Stop reason: HOSPADM

## 2019-05-11 RX ORDER — 0.9 % SODIUM CHLORIDE 0.9 %
1000 INTRAVENOUS SOLUTION INTRAVENOUS ONCE
Status: COMPLETED | OUTPATIENT
Start: 2019-05-11 | End: 2019-05-11

## 2019-05-11 RX ORDER — IBUPROFEN 800 MG/1
800 TABLET ORAL EVERY 8 HOURS PRN
Qty: 30 TABLET | Refills: 0 | Status: SHIPPED | OUTPATIENT
Start: 2019-05-11

## 2019-05-11 RX ADMIN — ONDANSETRON 4 MG: 2 INJECTION INTRAMUSCULAR; INTRAVENOUS at 12:59

## 2019-05-11 RX ADMIN — HYDROMORPHONE HYDROCHLORIDE 1 MG: 1 INJECTION, SOLUTION INTRAMUSCULAR; INTRAVENOUS; SUBCUTANEOUS at 13:52

## 2019-05-11 RX ADMIN — POTASSIUM CHLORIDE 40 MEQ: 20 TABLET, EXTENDED RELEASE ORAL at 14:24

## 2019-05-11 RX ADMIN — SODIUM CHLORIDE 1000 ML: 9 INJECTION, SOLUTION INTRAVENOUS at 12:58

## 2019-05-11 RX ADMIN — HYDROMORPHONE HYDROCHLORIDE 1 MG: 1 INJECTION, SOLUTION INTRAMUSCULAR; INTRAVENOUS; SUBCUTANEOUS at 12:59

## 2019-05-11 ASSESSMENT — PAIN DESCRIPTION - PAIN TYPE: TYPE: ACUTE PAIN

## 2019-05-11 ASSESSMENT — PAIN SCALES - GENERAL
PAINLEVEL_OUTOF10: 9
PAINLEVEL_OUTOF10: 10
PAINLEVEL_OUTOF10: 9

## 2019-05-11 ASSESSMENT — PAIN DESCRIPTION - FREQUENCY: FREQUENCY: CONTINUOUS

## 2019-05-11 ASSESSMENT — PAIN DESCRIPTION - LOCATION: LOCATION: GENERALIZED

## 2019-05-11 NOTE — ED PROVIDER NOTES
Seen with physician:    HPI:  5/11/19, Time: 1205PM      Ana Ponce is a 59 y.o. female presenting to the ED for evaluation of \"pain all over\", beginning prior to coming to ER today. The patient reports that she has \"pain all over\" onset worse today. The patient reports that she has a history of stage 4 ovarian cancer and has been having worsening pain. The patient reports that she has her Fentanyl patch in place and she could \"not get up\" to get her pain medication. She does take Norco and Methadone as well. She reports that she has a history of COPD and uses her nebulizer machine at home as prescribed. She currently has a nephrostomy tube currently and was recently admitted for dislodgement of the tube as well as apparent infection. Her complaint of pain has been constant, moderate to severe in severity. She is currently not on chemotherapy and is due to start her next chemo treatment May 15th. ROS:   Pertinent positives and negatives are stated within the HPI, all other systems reviewed and are negative.    --------------------------------------------- PAST HISTORY ---------------------------------------------  Past Medical History:  has a past medical history of COPD (chronic obstructive pulmonary disease) ([facility] [address] Hyperlipidemia, Hypertension, PMB (postmenopausal bleeding), and Squamous cell carcinoma. Past Surgical History:  has a past surgical history that includes Ureter stent placement; Tonsillectomy; Mouth surgery; Dilation and curettage of uterus; hernia repair; laparoscopy; other surgical history (06/27/2017); other surgical history (N/A, 08/04/2017); Hip fracture surgery (Right, 3/4/2019); Colonoscopy (N/A, 3/13/2019); joint replacement (Right); Nephrostomy (Right); Appendectomy; and Tonsillectomy. Social History:  reports that she has been smoking cigarettes. She has a 10.00 pack-year smoking history.  She has never used smokeless tobacco. She reports that she has current or past drug history. Drug: Marijuana. She reports that she does not drink alcohol. Family History: family history is not on file. The patients home medications have been reviewed. Allergies: Latex; Asa [aspirin];  Other; and Contrast [barium-containing compounds]    -------------------------------------------------- RESULTS -------------------------------------------------  All laboratory and radiology results have been personally reviewed by myself   LABS:  Results for orders placed or performed during the hospital encounter of 05/11/19   CBC auto differential   Result Value Ref Range    WBC 5.7 4.5 - 11.5 E9/L    RBC 3.31 (L) 3.50 - 5.50 E12/L    Hemoglobin 10.3 (L) 11.5 - 15.5 g/dL    Hematocrit 31.0 (L) 34.0 - 48.0 %    MCV 93.7 80.0 - 99.9 fL    MCH 31.1 26.0 - 35.0 pg    MCHC 33.2 32.0 - 34.5 %    RDW 16.0 (H) 11.5 - 15.0 fL    Platelets 873 818 - 852 E9/L    MPV 8.5 7.0 - 12.0 fL    Neutrophils % 73.1 43.0 - 80.0 %    Immature Granulocytes % 0.5 0.0 - 5.0 %    Lymphocytes % 11.5 (L) 20.0 - 42.0 %    Monocytes % 9.8 2.0 - 12.0 %    Eosinophils % 4.4 0.0 - 6.0 %    Basophils % 0.7 0.0 - 2.0 %    Neutrophils # 4.19 1.80 - 7.30 E9/L    Immature Granulocytes # 0.03 E9/L    Lymphocytes # 0.66 (L) 1.50 - 4.00 E9/L    Monocytes # 0.56 0.10 - 0.95 E9/L    Eosinophils # 0.25 0.05 - 0.50 E9/L    Basophils # 0.04 0.00 - 0.20 E9/L   Comprehensive Metabolic Panel   Result Value Ref Range    Sodium 135 132 - 146 mmol/L    Potassium 3.3 (L) 3.5 - 5.0 mmol/L    Chloride 102 98 - 107 mmol/L    CO2 24 22 - 29 mmol/L    Anion Gap 9 7 - 16 mmol/L    Glucose 85 74 - 99 mg/dL    BUN 12 8 - 23 mg/dL    CREATININE 1.4 (H) 0.5 - 1.0 mg/dL    GFR Non-African American 38 >=60 mL/min/1.73    GFR African American 46     Calcium 8.5 (L) 8.6 - 10.2 mg/dL    Total Protein 6.3 (L) 6.4 - 8.3 g/dL    Alb 3.3 (L) 3.5 - 5.2 g/dL    Total Bilirubin <0.2 0.0 - 1.2 mg/dL    Alkaline Phosphatase 82 35 - 104 U/L    ALT 6 0 - 32 U/L    AST 9 0 - 31 U/L Lactic Acid, Plasma   Result Value Ref Range    Lactic Acid 0.8 0.5 - 2.2 mmol/L   Troponin   Result Value Ref Range    Troponin 0.12 (H) 0.00 - 0.03 ng/mL   Magnesium   Result Value Ref Range    Magnesium 2.0 1.6 - 2.6 mg/dL       RADIOLOGY:  Interpreted by Radiologist.  XR CHEST PORTABLE   Final Result   1. Nodular density left lower lung could be reflective of nipple   shadow versus metastatic malignancy. Clinical correlation recommended. Consider repeat radiographs with nipple markers. ------------------------- NURSING NOTES AND VITALS REVIEWED ---------------------------   The nursing notes within the ED encounter and vital signs as below have been reviewed. /74   Pulse 85   Temp 97.5 °F (36.4 °C) (Temporal)   Resp 18   Ht 5' 3\" (1.6 m)   Wt 103 lb 14.4 oz (47.1 kg)   SpO2 98%   BMI 18.41 kg/m²   Oxygen Saturation Interpretation: Normal      ---------------------------------------------------PHYSICAL EXAM--------------------------------------      Constitutional/General: Alert and oriented x3, very anxious and cachectic appearing, non toxic in NAD  Head: NC/AT  Eyes: PERRL, EOMI  Ears and Nose: No discharge or bleeding  Mouth: Oropharynx clear, lips dry, handling secretions, no trismus  Neck: Supple, full ROM, no rigidity or stridor. Pulmonary: Lungs with coarse breath sounds noted posteriorly bilaterally. Not in respiratory distress  Cardiovascular:  Regular rate and rhythm, no ectopy. 2+ distal pulses  Abdomen: Soft, diffuse tenderness to upper and lower abdomen, bowel sounds active, no rebound or guarding. Nephrostomy tube intact to right flank area. Draining yellow urine, no visible blood noted. Extremities: Moves all extremities x 4. Warm and well perfused  Skin: warm and dry without rash  Neurologic: GCS 15  Psych: Normal Affect      EKG: This EKG is signed and interpreted by me.     Rate: 67  Rhythm: Sinus  Interpretation: no acute changes and non-specific EKG  Comparison: stable as compared to patient's most recent EKG    ------------------------------ ED COURSE/MEDICAL DECISION MAKING----------------------  Medications   heparin flush 100 UNIT/ML injection (has no administration in time range)   0.9 % sodium chloride bolus (0 mLs Intravenous Stopped 5/11/19 1447)   HYDROmorphone (DILAUDID) injection 1 mg (1 mg Intravenous Given 5/11/19 1259)   ondansetron (ZOFRAN) injection 4 mg (4 mg Intravenous Given 5/11/19 1259)   HYDROmorphone (DILAUDID) injection 1 mg (1 mg Intravenous Given 5/11/19 1352)   potassium chloride (KLOR-CON M) extended release tablet 40 mEq (40 mEq Oral Given 5/11/19 1424)       Medical Decision Making:    Patient to ER with complaints of body wide pain, patient with stage 4 uterine cancer. Patient advised of need to check some labs, urine as well as CXR. Patient CXR noted, ? Lung density left lung. Patient with known history of metastatic cancer- uterine primary. Patient EKG noted and stable, patient Troponin slightly elevated and discussed with Dr. Debbie Skaggs. Patient with known metastatic uterine cancer and having no current chest pain and normal EKG currently. Patient able to eat and drink in ER and feeling improved after additional dose of IV Dilaudid given. Patient asking to go home. She does not want to stay in the hospital.      Recommend close follow up with her PCP and her cancer doctor. Patient requesting Rx for Norco and patient advised she will need to get from Dr. Sherrie Basurto as [facility] report reviewed. Patient requesting Rx for Motrin as she has no money to buy over the counter. Rx for Motrin 800mg given. Counseling: The emergency provider has spoken with the patient  and discussed todays results, in addition to providing specific details for the plan of care and counseling regarding the diagnosis and prognosis.   Questions are answered at this time and they are agreeable with the plan.    --------------------------------- IMPRESSION AND

## 2019-05-12 LAB
EKG ATRIAL RATE: 67 BPM
EKG P AXIS: 74 DEGREES
EKG P-R INTERVAL: 126 MS
EKG Q-T INTERVAL: 450 MS
EKG QRS DURATION: 82 MS
EKG QTC CALCULATION (BAZETT): 475 MS
EKG R AXIS: 62 DEGREES
EKG T AXIS: 58 DEGREES
EKG VENTRICULAR RATE: 67 BPM

## 2019-05-12 PROCEDURE — 93010 ELECTROCARDIOGRAM REPORT: CPT | Performed by: INTERNAL MEDICINE

## 2019-05-16 DIAGNOSIS — S72.001A DISPLACED FRACTURE OF RIGHT FEMORAL NECK (HCC): Primary | ICD-10-CM

## 2019-05-17 ENCOUNTER — HOSPITAL ENCOUNTER (OUTPATIENT)
Dept: GENERAL RADIOLOGY | Age: 59
Discharge: HOME | End: 2019-05-19
Payer: OTHER GOVERNMENT

## 2019-05-17 ENCOUNTER — OFFICE VISIT (OUTPATIENT)
Dept: ORTHOPEDIC SURGERY | Age: 59
End: 2019-05-17
Payer: OTHER GOVERNMENT

## 2019-05-17 VITALS
DIASTOLIC BLOOD PRESSURE: 61 MMHG | WEIGHT: 96 LBS | SYSTOLIC BLOOD PRESSURE: 131 MMHG | HEART RATE: 75 BPM | HEIGHT: 63 IN | BODY MASS INDEX: 17.01 KG/M2

## 2019-05-17 DIAGNOSIS — S72.001A DISPLACED FRACTURE OF RIGHT FEMORAL NECK (HCC): ICD-10-CM

## 2019-05-17 PROCEDURE — 99024 POSTOP FOLLOW-UP VISIT: CPT | Performed by: PHYSICIAN ASSISTANT

## 2019-05-17 PROCEDURE — 73502 X-RAY EXAM HIP UNI 2-3 VIEWS: CPT

## 2019-05-17 PROCEDURE — 99213 OFFICE O/P EST LOW 20 MIN: CPT | Performed by: PHYSICIAN ASSISTANT

## 2019-05-17 NOTE — PROGRESS NOTES
Patient stated that her friend dropped her off and told her that she is on her own for a ride. She also stated that she only has to walk 3 blocks and she can get transportation. Advised her to stay, so we can arrange a ride for her and she left against our advice.

## 2019-05-17 NOTE — PATIENT INSTRUCTIONS
WB:  Weight bearing as tolerated advance  Continue to ambulate with walker  DVT: weight bearing as tolerated and early ambulation  Keep all follow up appointments with oncology  Follow up in 6 weeks with XR of the hip  Call if issues or concerns

## 2019-05-20 NOTE — PROGRESS NOTES
OP: DATE OF PROCEDURE:  03/04/2019     POSTOPERATIVE DIAGNOSIS:  Right valgus impacted femoral neck fracture.     OPERATIONS PERFORMED:  Closed reduction and percutaneous pinning, right  femoral neck fracture.     SURGEON:  Nicho Henderson M.D    Subjective:  Rodolfo Boyle is approximately 12 weeks operative above-mentioned procedure. Has significant medical comorbidities, pain medication prescribed through her oncologist. States she has been limited her weight on her hip ambulates with a walker. Hip pain controlled denies any increase. Denies new complaints or paresthesias. Denies shortness of breath, chest pain, or calf pain. Denies fevers or chills. Review of Systems -    General ROS: negative for - chills, fatigue, fever or night sweats  Respiratory ROS: no cough, shortness of breath, or wheezing  Cardiovascular ROS: no chest pain or dyspnea on exertion  Gastrointestinal ROS: no abdominal pain, nausea, vomiting, diarrhea, constipation,or black or bloody stools  Genitourinary: no hematuria, dysuria, or incontinence   Musculoskeletal ROS: negative for -back or neck pain or stiffness, also see HPI  Neurological ROS: no TIA or stroke symptoms       Objective:    General: Alert and oriented X 3 but appears medicated from chronic pain medication prescribed able to answer questions appropriately , normocephalic atraumatic, external ears and eye normal, sclera clear, no acute distress, respirations easy and unlabored with no audible wheezes, skin warm and dry, speech and dress appropriate for noted age, affect euthymic. Extremity:  right Lower Extremity Exam:    Incision over right hip well healed, no signs of infection: no erythema, drainage, fluctuance, or warmth appreciated     Skin intact . No erythema/induration/fluctuence present. mild swelling present. no ecchymosis present. Non tender to palpation over right hip today. No significant pain with hip ROM.  Negative log roll   Demonstrates active knee flexion/extension, ankle plantar/dorsiflexion/great toe extension. Sensation intact to light touch in sural/deep peroneal/superficial peroneal/saphenous/posterior tibial nerve distributions to foot/ankle. Palpable dorsalis pedis and posterior tibialis pulses, cap refill brisk in toes, foot warm/perfused. Compartments supple throughout thigh and leg. Calves soft non tender  Ambulates well with a walker    /61 (Site: Left Upper Arm, Position: Sitting, Cuff Size: Medium Adult)   Pulse 75   Ht 5' 3\" (1.6 m)   Wt 96 lb (43.5 kg)   BMI 17.01 kg/m²     XR:  Xrays of the right hip and pelvis demonstrate a femoral neck fracture in acceptable alignment, with interval healing. cannulated screws have not changed in alignment when compared to her most recent xrays. No acute osseous abnormalities. Xrays reviewed by Dr. Myrna Myers     Assessment:   Diagnosis Orders   1. Displaced fracture of right femoral neck ([address]         Plan:  Discussed xray findings with patient again today that there is no change of the cannulated screws today and will allow her to start WBAT. WB:  Weight bearing as tolerated advance  Continue to ambulate with walker  DVT: weight bearing as tolerated and early ambulation  Keep all follow up appointments with oncology  Follow up in 6 weeks with XR of the hip  Call if issues or concerns    Also noted in patient's exam that she appeared medicated from chronic pain medication she is prescribed. She was able to answer all questions appropriately on exam and vitals were not to be stable. Ambulated well with a walker. But she did mention that her friend had dropped her off for her visit but was unwilling to pick her up. She denied having driven herself. When we tried to arrange for a ride for the patient, she told us she was going to go outside for a smoke and never returned back into the office. She left against our wishes to have her stay and arrange a ride for her home.    Electronically signed by Dylon Baron PA-C on 5/19/2019 at 9:04 PM

## 2019-05-22 ENCOUNTER — APPOINTMENT (OUTPATIENT)
Dept: GENERAL RADIOLOGY | Age: 59
DRG: 180 | End: 2019-05-22
Payer: OTHER GOVERNMENT

## 2019-05-22 ENCOUNTER — APPOINTMENT (OUTPATIENT)
Dept: CT IMAGING | Age: 59
DRG: 180 | End: 2019-05-22
Payer: OTHER GOVERNMENT

## 2019-05-22 ENCOUNTER — HOSPITAL ENCOUNTER (INPATIENT)
Age: 59
LOS: 5 days | Discharge: HOME | DRG: 180 | End: 2019-05-27
Attending: EMERGENCY MEDICINE | Admitting: STUDENT IN AN ORGANIZED HEALTH CARE EDUCATION/TRAINING PROGRAM
Payer: OTHER GOVERNMENT

## 2019-05-22 DIAGNOSIS — R91.8 LUNG MASS: ICD-10-CM

## 2019-05-22 DIAGNOSIS — N30.01 ACUTE CYSTITIS WITH HEMATURIA: ICD-10-CM

## 2019-05-22 DIAGNOSIS — R77.8 ELEVATED TROPONIN: ICD-10-CM

## 2019-05-22 DIAGNOSIS — J44.1 COPD EXACERBATION (HCC): Primary | ICD-10-CM

## 2019-05-22 PROBLEM — E27.8 MASS OF BOTH ADRENAL GLANDS (HCC): Status: ACTIVE | Noted: 2019-05-22

## 2019-05-22 PROBLEM — R79.89 ELEVATED TROPONIN: Status: ACTIVE | Noted: 2019-05-22

## 2019-05-22 PROBLEM — R59.0 MEDIASTINAL LYMPHADENOPATHY: Status: ACTIVE | Noted: 2019-05-22

## 2019-05-22 PROBLEM — N39.0 RECURRENT UTI: Status: ACTIVE | Noted: 2019-05-22

## 2019-05-22 LAB
ALBUMIN SERPL-MCNC: 3.4 G/DL (ref 3.5–5.2)
ALP BLD-CCNC: 93 U/L (ref 35–104)
ALT SERPL-CCNC: 8 U/L (ref 0–32)
ANION GAP SERPL CALCULATED.3IONS-SCNC: 15 MMOL/L (ref 7–16)
AST SERPL-CCNC: 21 U/L (ref 0–31)
BACTERIA: ABNORMAL /HPF
BASOPHILS ABSOLUTE: 0.03 E9/L (ref 0–0.2)
BASOPHILS RELATIVE PERCENT: 0.5 % (ref 0–2)
BILIRUB SERPL-MCNC: <0.2 MG/DL (ref 0–1.2)
BILIRUBIN URINE: NEGATIVE
BLOOD, URINE: ABNORMAL
BUN BLDV-MCNC: 18 MG/DL (ref 8–23)
CALCIUM SERPL-MCNC: 9 MG/DL (ref 8.6–10.2)
CHLORIDE BLD-SCNC: 101 MMOL/L (ref 98–107)
CLARITY: CLEAR
CO2: 22 MMOL/L (ref 22–29)
COLOR: YELLOW
CREAT SERPL-MCNC: 1.3 MG/DL (ref 0.5–1)
EOSINOPHILS ABSOLUTE: 0.11 E9/L (ref 0.05–0.5)
EOSINOPHILS RELATIVE PERCENT: 1.7 % (ref 0–6)
EPITHELIAL CELLS, UA: ABNORMAL /HPF
GFR AFRICAN AMERICAN: 50
GFR NON-AFRICAN AMERICAN: 42 ML/MIN/1.73
GLUCOSE BLD-MCNC: 87 MG/DL (ref 74–99)
GLUCOSE URINE: NEGATIVE MG/DL
HCT VFR BLD CALC: 37.6 % (ref 34–48)
HEMOGLOBIN: 12 G/DL (ref 11.5–15.5)
IMMATURE GRANULOCYTES #: 0.03 E9/L
IMMATURE GRANULOCYTES %: 0.5 % (ref 0–5)
KETONES, URINE: NEGATIVE MG/DL
LEUKOCYTE ESTERASE, URINE: ABNORMAL
LYMPHOCYTES ABSOLUTE: 0.67 E9/L (ref 1.5–4)
LYMPHOCYTES RELATIVE PERCENT: 10.2 % (ref 20–42)
MCH RBC QN AUTO: 30.4 PG (ref 26–35)
MCHC RBC AUTO-ENTMCNC: 31.9 % (ref 32–34.5)
MCV RBC AUTO: 95.2 FL (ref 80–99.9)
MONOCYTES ABSOLUTE: 0.53 E9/L (ref 0.1–0.95)
MONOCYTES RELATIVE PERCENT: 8.1 % (ref 2–12)
NEUTROPHILS ABSOLUTE: 5.18 E9/L (ref 1.8–7.3)
NEUTROPHILS RELATIVE PERCENT: 79 % (ref 43–80)
NITRITE, URINE: POSITIVE
PDW BLD-RTO: 15.5 FL (ref 11.5–15)
PH UA: 8.5 (ref 5–9)
PLATELET # BLD: 361 E9/L (ref 130–450)
PMV BLD AUTO: 9.1 FL (ref 7–12)
POTASSIUM REFLEX MAGNESIUM: 5.6 MMOL/L (ref 3.5–5)
POTASSIUM SERPL-SCNC: 5 MMOL/L (ref 3.5–5)
PROTEIN UA: 100 MG/DL
RBC # BLD: 3.95 E12/L (ref 3.5–5.5)
RBC UA: >20 /HPF (ref 0–2)
SODIUM BLD-SCNC: 138 MMOL/L (ref 132–146)
SPECIFIC GRAVITY UA: 1.01 (ref 1–1.03)
TOTAL PROTEIN: 7.5 G/DL (ref 6.4–8.3)
TROPONIN: 0.18 NG/ML (ref 0–0.03)
UROBILINOGEN, URINE: 0.2 E.U./DL
WBC # BLD: 6.6 E9/L (ref 4.5–11.5)
WBC UA: >20 /HPF (ref 0–5)

## 2019-05-22 PROCEDURE — 96376 TX/PRO/DX INJ SAME DRUG ADON: CPT

## 2019-05-22 PROCEDURE — 85025 COMPLETE CBC W/AUTO DIFF WBC: CPT

## 2019-05-22 PROCEDURE — 99285 EMERGENCY DEPT VISIT HI MDM: CPT

## 2019-05-22 PROCEDURE — 71275 CT ANGIOGRAPHY CHEST: CPT

## 2019-05-22 PROCEDURE — 6360000002 HC RX W HCPCS: Performed by: STUDENT IN AN ORGANIZED HEALTH CARE EDUCATION/TRAINING PROGRAM

## 2019-05-22 PROCEDURE — 84132 ASSAY OF SERUM POTASSIUM: CPT

## 2019-05-22 PROCEDURE — 2060000000 HC ICU INTERMEDIATE R&B

## 2019-05-22 PROCEDURE — 2580000003 HC RX 258: Performed by: STUDENT IN AN ORGANIZED HEALTH CARE EDUCATION/TRAINING PROGRAM

## 2019-05-22 PROCEDURE — 96374 THER/PROPH/DIAG INJ IV PUSH: CPT

## 2019-05-22 PROCEDURE — 87040 BLOOD CULTURE FOR BACTERIA: CPT

## 2019-05-22 PROCEDURE — 6360000002 HC RX W HCPCS: Performed by: FAMILY MEDICINE

## 2019-05-22 PROCEDURE — 93010 ELECTROCARDIOGRAM REPORT: CPT | Performed by: INTERNAL MEDICINE

## 2019-05-22 PROCEDURE — 87088 URINE BACTERIA CULTURE: CPT

## 2019-05-22 PROCEDURE — 96375 TX/PRO/DX INJ NEW DRUG ADDON: CPT

## 2019-05-22 PROCEDURE — 74177 CT ABD & PELVIS W/CONTRAST: CPT

## 2019-05-22 PROCEDURE — 94761 N-INVAS EAR/PLS OXIMETRY MLT: CPT

## 2019-05-22 PROCEDURE — 94664 DEMO&/EVAL PT USE INHALER: CPT

## 2019-05-22 PROCEDURE — 93005 ELECTROCARDIOGRAM TRACING: CPT | Performed by: STUDENT IN AN ORGANIZED HEALTH CARE EDUCATION/TRAINING PROGRAM

## 2019-05-22 PROCEDURE — 80053 COMPREHEN METABOLIC PANEL: CPT

## 2019-05-22 PROCEDURE — 84484 ASSAY OF TROPONIN QUANT: CPT

## 2019-05-22 PROCEDURE — 6360000004 HC RX CONTRAST MEDICATION: Performed by: RADIOLOGY

## 2019-05-22 PROCEDURE — 71045 X-RAY EXAM CHEST 1 VIEW: CPT

## 2019-05-22 PROCEDURE — 81001 URINALYSIS AUTO W/SCOPE: CPT

## 2019-05-22 PROCEDURE — 36415 COLL VENOUS BLD VENIPUNCTURE: CPT

## 2019-05-22 RX ORDER — HYDROCODONE BITARTRATE AND ACETAMINOPHEN 5; 325 MG/1; MG/1
1 TABLET ORAL EVERY 6 HOURS PRN
Status: DISCONTINUED | OUTPATIENT
Start: 2019-05-22 | End: 2019-05-25

## 2019-05-22 RX ORDER — METHYLPREDNISOLONE SODIUM SUCCINATE 125 MG/2ML
80 INJECTION, POWDER, LYOPHILIZED, FOR SOLUTION INTRAMUSCULAR; INTRAVENOUS ONCE
Status: COMPLETED | OUTPATIENT
Start: 2019-05-22 | End: 2019-05-22

## 2019-05-22 RX ORDER — FENTANYL CITRATE 50 UG/ML
50 INJECTION, SOLUTION INTRAMUSCULAR; INTRAVENOUS ONCE
Status: COMPLETED | OUTPATIENT
Start: 2019-05-22 | End: 2019-05-22

## 2019-05-22 RX ORDER — ALBUTEROL SULFATE 2.5 MG/3ML
2.5 SOLUTION RESPIRATORY (INHALATION) EVERY 4 HOURS PRN
Status: DISCONTINUED | OUTPATIENT
Start: 2019-05-22 | End: 2019-05-27 | Stop reason: HOSPADM

## 2019-05-22 RX ORDER — MORPHINE SULFATE 2 MG/ML
2 INJECTION, SOLUTION INTRAMUSCULAR; INTRAVENOUS EVERY 4 HOURS PRN
Status: DISCONTINUED | OUTPATIENT
Start: 2019-05-22 | End: 2019-05-27 | Stop reason: HOSPADM

## 2019-05-22 RX ADMIN — METHYLPREDNISOLONE SODIUM SUCCINATE 80 MG: 125 INJECTION, POWDER, FOR SOLUTION INTRAMUSCULAR; INTRAVENOUS at 16:30

## 2019-05-22 RX ADMIN — FENTANYL CITRATE 50 MCG: 50 INJECTION, SOLUTION INTRAMUSCULAR; INTRAVENOUS at 16:32

## 2019-05-22 RX ADMIN — FENTANYL CITRATE 50 MCG: 50 INJECTION, SOLUTION INTRAMUSCULAR; INTRAVENOUS at 19:03

## 2019-05-22 RX ADMIN — IPRATROPIUM BROMIDE 0.5 MG: 0.5 SOLUTION RESPIRATORY (INHALATION) at 16:05

## 2019-05-22 RX ADMIN — IOPAMIDOL 110 ML: 755 INJECTION, SOLUTION INTRAVENOUS at 19:20

## 2019-05-22 RX ADMIN — CEFTRIAXONE SODIUM 1 G: 1 INJECTION, POWDER, FOR SOLUTION INTRAMUSCULAR; INTRAVENOUS at 21:40

## 2019-05-22 RX ADMIN — MORPHINE SULFATE 2 MG: 2 INJECTION, SOLUTION INTRAMUSCULAR; INTRAVENOUS at 23:09

## 2019-05-22 RX ADMIN — FENTANYL CITRATE 50 MCG: 50 INJECTION, SOLUTION INTRAMUSCULAR; INTRAVENOUS at 21:40

## 2019-05-22 ASSESSMENT — ENCOUNTER SYMPTOMS
SPUTUM PRODUCTION: 0
HEMOPTYSIS: 0
VOMITING: 0
COUGH: 0
SWOLLEN GLANDS: 0
NAUSEA: 0
DIARRHEA: 0
WHEEZING: 0
CONSTIPATION: 0
SORE THROAT: 0
CHEST TIGHTNESS: 0
ABDOMINAL PAIN: 1
SHORTNESS OF BREATH: 1

## 2019-05-22 ASSESSMENT — PAIN DESCRIPTION - FREQUENCY
FREQUENCY: CONTINUOUS

## 2019-05-22 ASSESSMENT — PAIN DESCRIPTION - PAIN TYPE
TYPE: CHRONIC PAIN
TYPE: CHRONIC PAIN
TYPE: ACUTE PAIN;CHRONIC PAIN

## 2019-05-22 ASSESSMENT — PAIN SCALES - GENERAL
PAINLEVEL_OUTOF10: 10
PAINLEVEL_OUTOF10: 10
PAINLEVEL_OUTOF10: 0
PAINLEVEL_OUTOF10: 10

## 2019-05-22 ASSESSMENT — PAIN DESCRIPTION - PROGRESSION
CLINICAL_PROGRESSION: NOT CHANGED
CLINICAL_PROGRESSION: NOT CHANGED

## 2019-05-22 ASSESSMENT — PAIN DESCRIPTION - LOCATION
LOCATION: CHEST

## 2019-05-22 ASSESSMENT — PAIN DESCRIPTION - ORIENTATION
ORIENTATION: MID

## 2019-05-22 ASSESSMENT — PAIN DESCRIPTION - ONSET
ONSET: ON-GOING
ONSET: ON-GOING
ONSET: GRADUAL

## 2019-05-22 ASSESSMENT — PAIN DESCRIPTION - DESCRIPTORS
DESCRIPTORS: BURNING;STABBING
DESCRIPTORS: ACHING;CONSTANT;DISCOMFORT
DESCRIPTORS: ACHING;DISCOMFORT;DULL

## 2019-05-22 ASSESSMENT — HEART SCORE: ECG: 0

## 2019-05-23 ENCOUNTER — APPOINTMENT (OUTPATIENT)
Dept: ULTRASOUND IMAGING | Age: 59
DRG: 180 | End: 2019-05-23
Payer: OTHER GOVERNMENT

## 2019-05-23 ENCOUNTER — HOSPITAL ENCOUNTER (OUTPATIENT)
Dept: RADIATION ONCOLOGY | Age: 59
Discharge: HOME | End: 2019-05-23
Attending: RADIOLOGY
Payer: OTHER GOVERNMENT

## 2019-05-23 PROBLEM — M79.89 LEFT LEG SWELLING: Status: ACTIVE | Noted: 2019-05-23

## 2019-05-23 PROBLEM — J18.9 PNA (PNEUMONIA): Status: ACTIVE | Noted: 2019-05-23

## 2019-05-23 PROBLEM — T17.500A MUCUS PLUGGING OF BRONCHI: Status: ACTIVE | Noted: 2019-05-23

## 2019-05-23 PROBLEM — N18.30 STAGE 3 CHRONIC KIDNEY DISEASE (HCC): Status: ACTIVE | Noted: 2019-05-23

## 2019-05-23 PROBLEM — E43 SEVERE PROTEIN-CALORIE MALNUTRITION (HCC): Chronic | Status: ACTIVE | Noted: 2019-05-23

## 2019-05-23 PROBLEM — E43 SEVERE MALNUTRITION (HCC): Status: ACTIVE | Noted: 2019-05-23

## 2019-05-23 PROBLEM — J96.00 ACUTE RESPIRATORY FAILURE (HCC): Status: ACTIVE | Noted: 2019-05-23

## 2019-05-23 LAB
ANION GAP SERPL CALCULATED.3IONS-SCNC: 12 MMOL/L (ref 7–16)
ANISOCYTOSIS: ABNORMAL
B.E.: -2.3 MMOL/L (ref -3–3)
BASOPHILS ABSOLUTE: 0.01 E9/L (ref 0–0.2)
BASOPHILS RELATIVE PERCENT: 0.2 % (ref 0–2)
BUN BLDV-MCNC: 18 MG/DL (ref 8–23)
CALCIUM SERPL-MCNC: 8.3 MG/DL (ref 8.6–10.2)
CHLORIDE BLD-SCNC: 100 MMOL/L (ref 98–107)
CO2: 23 MMOL/L (ref 22–29)
COHB: 1.2 % (ref 0–1.5)
CREAT SERPL-MCNC: 1.4 MG/DL (ref 0.5–1)
CRITICAL: ABNORMAL
DATE ANALYZED: ABNORMAL
DATE OF COLLECTION: ABNORMAL
EKG ATRIAL RATE: 67 BPM
EKG P AXIS: 77 DEGREES
EKG P-R INTERVAL: 126 MS
EKG Q-T INTERVAL: 422 MS
EKG QRS DURATION: 76 MS
EKG QTC CALCULATION (BAZETT): 445 MS
EKG R AXIS: 53 DEGREES
EKG T AXIS: 65 DEGREES
EKG VENTRICULAR RATE: 67 BPM
EOSINOPHILS ABSOLUTE: 0 E9/L (ref 0.05–0.5)
EOSINOPHILS RELATIVE PERCENT: 0 % (ref 0–6)
GFR AFRICAN AMERICAN: 46
GFR NON-AFRICAN AMERICAN: 38 ML/MIN/1.73
GLUCOSE BLD-MCNC: 144 MG/DL (ref 74–99)
HCO3: 22.2 MMOL/L (ref 22–26)
HCT VFR BLD CALC: 32.7 % (ref 34–48)
HEMOGLOBIN: 10.3 G/DL (ref 11.5–15.5)
HHB: 16.9 % (ref 0–5)
IMMATURE GRANULOCYTES #: 0.04 E9/L
IMMATURE GRANULOCYTES %: 0.7 % (ref 0–5)
INR BLD: 1.1
LAB: ABNORMAL
LACTIC ACID: 2.8 MMOL/L (ref 0.5–2.2)
LYMPHOCYTES ABSOLUTE: 0.48 E9/L (ref 1.5–4)
LYMPHOCYTES RELATIVE PERCENT: 8.7 % (ref 20–42)
Lab: ABNORMAL
MCH RBC QN AUTO: 30.2 PG (ref 26–35)
MCHC RBC AUTO-ENTMCNC: 31.5 % (ref 32–34.5)
MCV RBC AUTO: 95.9 FL (ref 80–99.9)
METHB: 0.3 % (ref 0–1.5)
MODE: ABNORMAL
MONOCYTES ABSOLUTE: 0.15 E9/L (ref 0.1–0.95)
MONOCYTES RELATIVE PERCENT: 2.7 % (ref 2–12)
NEUTROPHILS ABSOLUTE: 4.86 E9/L (ref 1.8–7.3)
NEUTROPHILS RELATIVE PERCENT: 87.7 % (ref 43–80)
O2 SATURATION: 82.8 % (ref 92–98.5)
O2HB: 81.6 % (ref 94–97)
OPERATOR ID: 459
OVALOCYTES: ABNORMAL
PATIENT TEMP: 37 C
PCO2: 37.2 MMHG (ref 35–45)
PDW BLD-RTO: 15.5 FL (ref 11.5–15)
PH BLOOD GAS: 7.39 (ref 7.35–7.45)
PLATELET # BLD: 315 E9/L (ref 130–450)
PMV BLD AUTO: 9.1 FL (ref 7–12)
PO2: 49.6 MMHG (ref 60–100)
POIKILOCYTES: ABNORMAL
POLYCHROMASIA: ABNORMAL
POTASSIUM REFLEX MAGNESIUM: 5.1 MMOL/L (ref 3.5–5)
POTASSIUM REFLEX MAGNESIUM: 5.2 MMOL/L (ref 3.5–5)
PRO-BNP: 3660 PG/ML (ref 0–125)
PROCALCITONIN: 0.1 NG/ML (ref 0–0.08)
PROTHROMBIN TIME: 12.3 SEC (ref 9.3–12.4)
RBC # BLD: 3.41 E12/L (ref 3.5–5.5)
SCHISTOCYTES: ABNORMAL
SODIUM BLD-SCNC: 135 MMOL/L (ref 132–146)
SOURCE, BLOOD GAS: ABNORMAL
THB: 12.4 G/DL (ref 11.5–16.5)
TIME ANALYZED: 112
URINE CULTURE, ROUTINE: NORMAL
WBC # BLD: 5.5 E9/L (ref 4.5–11.5)

## 2019-05-23 PROCEDURE — 6370000000 HC RX 637 (ALT 250 FOR IP): Performed by: FAMILY MEDICINE

## 2019-05-23 PROCEDURE — 94660 CPAP INITIATION&MGMT: CPT

## 2019-05-23 PROCEDURE — 85610 PROTHROMBIN TIME: CPT

## 2019-05-23 PROCEDURE — 6370000000 HC RX 637 (ALT 250 FOR IP): Performed by: STUDENT IN AN ORGANIZED HEALTH CARE EDUCATION/TRAINING PROGRAM

## 2019-05-23 PROCEDURE — 84145 PROCALCITONIN (PCT): CPT

## 2019-05-23 PROCEDURE — 83605 ASSAY OF LACTIC ACID: CPT

## 2019-05-23 PROCEDURE — 77417 THER RADIOLOGY PORT IMAGE(S): CPT | Performed by: RADIOLOGY

## 2019-05-23 PROCEDURE — 80048 BASIC METABOLIC PNL TOTAL CA: CPT

## 2019-05-23 PROCEDURE — 99222 1ST HOSP IP/OBS MODERATE 55: CPT | Performed by: PHYSICIAN ASSISTANT

## 2019-05-23 PROCEDURE — 85025 COMPLETE CBC W/AUTO DIFF WBC: CPT

## 2019-05-23 PROCEDURE — 36415 COLL VENOUS BLD VENIPUNCTURE: CPT

## 2019-05-23 PROCEDURE — 77334 RADIATION TREATMENT AID(S): CPT | Performed by: RADIOLOGY

## 2019-05-23 PROCEDURE — 87070 CULTURE OTHR SPECIMN AEROBIC: CPT

## 2019-05-23 PROCEDURE — 77412 RADIATION TX DELIVERY LVL 3: CPT | Performed by: RADIOLOGY

## 2019-05-23 PROCEDURE — 2580000003 HC RX 258: Performed by: FAMILY MEDICINE

## 2019-05-23 PROCEDURE — 1200000000 HC SEMI PRIVATE

## 2019-05-23 PROCEDURE — 83880 ASSAY OF NATRIURETIC PEPTIDE: CPT

## 2019-05-23 PROCEDURE — 99223 1ST HOSP IP/OBS HIGH 75: CPT | Performed by: INTERNAL MEDICINE

## 2019-05-23 PROCEDURE — 94640 AIRWAY INHALATION TREATMENT: CPT

## 2019-05-23 PROCEDURE — 87040 BLOOD CULTURE FOR BACTERIA: CPT

## 2019-05-23 PROCEDURE — 82805 BLOOD GASES W/O2 SATURATION: CPT

## 2019-05-23 PROCEDURE — 99223 1ST HOSP IP/OBS HIGH 75: CPT | Performed by: RADIOLOGY

## 2019-05-23 PROCEDURE — 93970 EXTREMITY STUDY: CPT

## 2019-05-23 PROCEDURE — 84132 ASSAY OF SERUM POTASSIUM: CPT

## 2019-05-23 PROCEDURE — 87206 SMEAR FLUORESCENT/ACID STAI: CPT

## 2019-05-23 PROCEDURE — 77300 RADIATION THERAPY DOSE PLAN: CPT | Performed by: RADIOLOGY

## 2019-05-23 PROCEDURE — 6360000002 HC RX W HCPCS: Performed by: FAMILY MEDICINE

## 2019-05-23 PROCEDURE — 77290 THER RAD SIMULAJ FIELD CPLX: CPT | Performed by: RADIOLOGY

## 2019-05-23 RX ORDER — OXYBUTYNIN CHLORIDE 5 MG/1
5 TABLET ORAL DAILY
Status: DISCONTINUED | OUTPATIENT
Start: 2019-05-23 | End: 2019-05-27 | Stop reason: HOSPADM

## 2019-05-23 RX ORDER — SODIUM CHLORIDE 0.9 % (FLUSH) 0.9 %
10 SYRINGE (ML) INJECTION PRN
Status: DISCONTINUED | OUTPATIENT
Start: 2019-05-23 | End: 2019-05-27 | Stop reason: HOSPADM

## 2019-05-23 RX ORDER — DOCUSATE SODIUM 100 MG/1
100 CAPSULE, LIQUID FILLED ORAL 2 TIMES DAILY
Status: DISCONTINUED | OUTPATIENT
Start: 2019-05-23 | End: 2019-05-27 | Stop reason: HOSPADM

## 2019-05-23 RX ORDER — GABAPENTIN 600 MG/1
600 TABLET ORAL 3 TIMES DAILY
Status: DISCONTINUED | OUTPATIENT
Start: 2019-05-23 | End: 2019-05-27 | Stop reason: HOSPADM

## 2019-05-23 RX ORDER — METOCLOPRAMIDE 5 MG/1
5 TABLET ORAL 4 TIMES DAILY
Status: DISCONTINUED | OUTPATIENT
Start: 2019-05-23 | End: 2019-05-27 | Stop reason: HOSPADM

## 2019-05-23 RX ORDER — FENTANYL 100 UG/H
1 PATCH TRANSDERMAL
Status: DISCONTINUED | OUTPATIENT
Start: 2019-05-24 | End: 2019-05-23

## 2019-05-23 RX ORDER — IPRATROPIUM BROMIDE AND ALBUTEROL SULFATE 2.5; .5 MG/3ML; MG/3ML
1 SOLUTION RESPIRATORY (INHALATION) 4 TIMES DAILY
Status: DISCONTINUED | OUTPATIENT
Start: 2019-05-23 | End: 2019-05-27 | Stop reason: HOSPADM

## 2019-05-23 RX ORDER — FENTANYL 100 UG/H
1 PATCH TRANSDERMAL
Status: DISCONTINUED | OUTPATIENT
Start: 2019-05-23 | End: 2019-05-27 | Stop reason: HOSPADM

## 2019-05-23 RX ORDER — ONDANSETRON 2 MG/ML
4 INJECTION INTRAMUSCULAR; INTRAVENOUS EVERY 6 HOURS PRN
Status: DISCONTINUED | OUTPATIENT
Start: 2019-05-23 | End: 2019-05-26

## 2019-05-23 RX ORDER — SODIUM POLYSTYRENE SULFONATE 4.1 MEQ/G
15 POWDER, FOR SUSPENSION ORAL; RECTAL ONCE
Status: COMPLETED | OUTPATIENT
Start: 2019-05-23 | End: 2019-05-23

## 2019-05-23 RX ORDER — ANALGESIC BALM 1.74; 4.06 G/29G; G/29G
OINTMENT TOPICAL PRN
Status: DISCONTINUED | OUTPATIENT
Start: 2019-05-23 | End: 2019-05-27 | Stop reason: HOSPADM

## 2019-05-23 RX ORDER — METHADONE HYDROCHLORIDE 10 MG/1
10 TABLET ORAL EVERY 8 HOURS PRN
Status: DISCONTINUED | OUTPATIENT
Start: 2019-05-23 | End: 2019-05-27 | Stop reason: HOSPADM

## 2019-05-23 RX ORDER — ACETAMINOPHEN 325 MG/1
650 TABLET ORAL EVERY 6 HOURS PRN
Status: DISCONTINUED | OUTPATIENT
Start: 2019-05-23 | End: 2019-05-27 | Stop reason: HOSPADM

## 2019-05-23 RX ORDER — METHYLPREDNISOLONE SODIUM SUCCINATE 40 MG/ML
40 INJECTION, POWDER, LYOPHILIZED, FOR SOLUTION INTRAMUSCULAR; INTRAVENOUS EVERY 6 HOURS SCHEDULED
Status: DISCONTINUED | OUTPATIENT
Start: 2019-05-23 | End: 2019-05-24

## 2019-05-23 RX ORDER — GUAIFENESIN/DEXTROMETHORPHAN 100-10MG/5
5 SYRUP ORAL EVERY 4 HOURS PRN
Status: DISCONTINUED | OUTPATIENT
Start: 2019-05-23 | End: 2019-05-27 | Stop reason: HOSPADM

## 2019-05-23 RX ORDER — SODIUM CHLORIDE 0.9 % (FLUSH) 0.9 %
10 SYRINGE (ML) INJECTION EVERY 12 HOURS SCHEDULED
Status: DISCONTINUED | OUTPATIENT
Start: 2019-05-23 | End: 2019-05-27 | Stop reason: HOSPADM

## 2019-05-23 RX ORDER — IPRATROPIUM BROMIDE AND ALBUTEROL SULFATE 2.5; .5 MG/3ML; MG/3ML
1 SOLUTION RESPIRATORY (INHALATION) EVERY 4 HOURS PRN
Status: DISCONTINUED | OUTPATIENT
Start: 2019-05-23 | End: 2019-05-27 | Stop reason: HOSPADM

## 2019-05-23 RX ADMIN — METOCLOPRAMIDE 5 MG: 5 TABLET ORAL at 08:57

## 2019-05-23 RX ADMIN — METHYLPREDNISOLONE SODIUM SUCCINATE 40 MG: 40 INJECTION, POWDER, FOR SOLUTION INTRAMUSCULAR; INTRAVENOUS at 12:00

## 2019-05-23 RX ADMIN — METHADONE HYDROCHLORIDE 10 MG: 10 TABLET ORAL at 00:58

## 2019-05-23 RX ADMIN — ONDANSETRON HYDROCHLORIDE 4 MG: 2 SOLUTION INTRAMUSCULAR; INTRAVENOUS at 00:58

## 2019-05-23 RX ADMIN — METHADONE HYDROCHLORIDE 10 MG: 10 TABLET ORAL at 09:06

## 2019-05-23 RX ADMIN — METHADONE HYDROCHLORIDE 10 MG: 10 TABLET ORAL at 22:43

## 2019-05-23 RX ADMIN — DOCUSATE SODIUM 100 MG: 100 CAPSULE, LIQUID FILLED ORAL at 08:57

## 2019-05-23 RX ADMIN — IPRATROPIUM BROMIDE AND ALBUTEROL SULFATE 1 AMPULE: .5; 3 SOLUTION RESPIRATORY (INHALATION) at 20:48

## 2019-05-23 RX ADMIN — DOCUSATE SODIUM 100 MG: 100 CAPSULE, LIQUID FILLED ORAL at 01:17

## 2019-05-23 RX ADMIN — Medication 10 ML: at 08:59

## 2019-05-23 RX ADMIN — GABAPENTIN 600 MG: 600 TABLET, FILM COATED ORAL at 15:49

## 2019-05-23 RX ADMIN — VANCOMYCIN HYDROCHLORIDE 750 MG: 10 INJECTION, POWDER, LYOPHILIZED, FOR SOLUTION INTRAVENOUS at 01:17

## 2019-05-23 RX ADMIN — MORPHINE SULFATE 2 MG: 2 INJECTION, SOLUTION INTRAMUSCULAR; INTRAVENOUS at 02:59

## 2019-05-23 RX ADMIN — HYDROCODONE BITARTRATE AND ACETAMINOPHEN 1 TABLET: 5; 325 TABLET ORAL at 00:01

## 2019-05-23 RX ADMIN — CEFTRIAXONE SODIUM 1 G: 1 INJECTION, POWDER, FOR SOLUTION INTRAMUSCULAR; INTRAVENOUS at 20:15

## 2019-05-23 RX ADMIN — ONDANSETRON HYDROCHLORIDE 4 MG: 2 SOLUTION INTRAMUSCULAR; INTRAVENOUS at 22:14

## 2019-05-23 RX ADMIN — METHYLPREDNISOLONE SODIUM SUCCINATE 40 MG: 40 INJECTION, POWDER, FOR SOLUTION INTRAMUSCULAR; INTRAVENOUS at 07:01

## 2019-05-23 RX ADMIN — METHYLPREDNISOLONE SODIUM SUCCINATE 40 MG: 40 INJECTION, POWDER, FOR SOLUTION INTRAMUSCULAR; INTRAVENOUS at 00:58

## 2019-05-23 RX ADMIN — HYDROCODONE BITARTRATE AND ACETAMINOPHEN 1 TABLET: 5; 325 TABLET ORAL at 20:15

## 2019-05-23 RX ADMIN — Medication 10 ML: at 07:02

## 2019-05-23 RX ADMIN — METOCLOPRAMIDE 5 MG: 5 TABLET ORAL at 20:15

## 2019-05-23 RX ADMIN — METHYLPREDNISOLONE SODIUM SUCCINATE 40 MG: 40 INJECTION, POWDER, FOR SOLUTION INTRAMUSCULAR; INTRAVENOUS at 17:33

## 2019-05-23 RX ADMIN — DOCUSATE SODIUM 100 MG: 100 CAPSULE, LIQUID FILLED ORAL at 20:15

## 2019-05-23 RX ADMIN — ACETAMINOPHEN 650 MG: 325 TABLET, FILM COATED ORAL at 07:01

## 2019-05-23 RX ADMIN — MORPHINE SULFATE 2 MG: 2 INJECTION, SOLUTION INTRAMUSCULAR; INTRAVENOUS at 22:14

## 2019-05-23 RX ADMIN — GUAIFENESIN AND DEXTROMETHORPHAN 5 ML: 100; 10 SYRUP ORAL at 00:58

## 2019-05-23 RX ADMIN — HYDROCODONE BITARTRATE AND ACETAMINOPHEN 1 TABLET: 5; 325 TABLET ORAL at 05:55

## 2019-05-23 RX ADMIN — METOCLOPRAMIDE 5 MG: 5 TABLET ORAL at 17:33

## 2019-05-23 RX ADMIN — METOCLOPRAMIDE 5 MG: 5 TABLET ORAL at 15:49

## 2019-05-23 RX ADMIN — GABAPENTIN 600 MG: 600 TABLET, FILM COATED ORAL at 20:15

## 2019-05-23 RX ADMIN — MORPHINE SULFATE 2 MG: 2 INJECTION, SOLUTION INTRAMUSCULAR; INTRAVENOUS at 17:37

## 2019-05-23 RX ADMIN — GABAPENTIN 600 MG: 600 TABLET, FILM COATED ORAL at 08:57

## 2019-05-23 RX ADMIN — OXYBUTYNIN CHLORIDE 5 MG: 5 TABLET ORAL at 08:57

## 2019-05-23 RX ADMIN — IPRATROPIUM BROMIDE AND ALBUTEROL SULFATE 1 AMPULE: .5; 3 SOLUTION RESPIRATORY (INHALATION) at 17:24

## 2019-05-23 RX ADMIN — Medication 10 ML: at 20:16

## 2019-05-23 RX ADMIN — Medication 10 ML: at 01:03

## 2019-05-23 RX ADMIN — SERTRALINE 50 MG: 50 TABLET, FILM COATED ORAL at 08:57

## 2019-05-23 RX ADMIN — SODIUM POLYSTYRENE SULFONATE 15 G: 1 POWDER ORAL; RECTAL at 15:41

## 2019-05-23 RX ADMIN — GUAIFENESIN AND DEXTROMETHORPHAN 5 ML: 100; 10 SYRUP ORAL at 05:55

## 2019-05-23 RX ADMIN — Medication 10 ML: at 02:59

## 2019-05-23 RX ADMIN — MORPHINE SULFATE 2 MG: 2 INJECTION, SOLUTION INTRAMUSCULAR; INTRAVENOUS at 07:01

## 2019-05-23 RX ADMIN — ACETAMINOPHEN 650 MG: 325 TABLET, FILM COATED ORAL at 00:58

## 2019-05-23 RX ADMIN — IPRATROPIUM BROMIDE AND ALBUTEROL SULFATE 1 AMPULE: .5; 3 SOLUTION RESPIRATORY (INHALATION) at 08:52

## 2019-05-23 RX ADMIN — METOCLOPRAMIDE 5 MG: 5 TABLET ORAL at 01:17

## 2019-05-23 RX ADMIN — VANCOMYCIN HYDROCHLORIDE 750 MG: 10 INJECTION, POWDER, LYOPHILIZED, FOR SOLUTION INTRAVENOUS at 15:43

## 2019-05-23 ASSESSMENT — PAIN DESCRIPTION - FREQUENCY
FREQUENCY: CONTINUOUS

## 2019-05-23 ASSESSMENT — PAIN SCALES - GENERAL
PAINLEVEL_OUTOF10: 10
PAINLEVEL_OUTOF10: 6
PAINLEVEL_OUTOF10: 10
PAINLEVEL_OUTOF10: 0
PAINLEVEL_OUTOF10: 10
PAINLEVEL_OUTOF10: 10
PAINLEVEL_OUTOF10: 6
PAINLEVEL_OUTOF10: 10
PAINLEVEL_OUTOF10: 7
PAINLEVEL_OUTOF10: 10
PAINLEVEL_OUTOF10: 8
PAINLEVEL_OUTOF10: 7
PAINLEVEL_OUTOF10: 0
PAINLEVEL_OUTOF10: 4
PAINLEVEL_OUTOF10: 7

## 2019-05-23 ASSESSMENT — PAIN - FUNCTIONAL ASSESSMENT
PAIN_FUNCTIONAL_ASSESSMENT: PREVENTS OR INTERFERES SOME ACTIVE ACTIVITIES AND ADLS

## 2019-05-23 ASSESSMENT — PAIN DESCRIPTION - PROGRESSION
CLINICAL_PROGRESSION: NOT CHANGED
CLINICAL_PROGRESSION: NOT CHANGED
CLINICAL_PROGRESSION: GRADUALLY WORSENING
CLINICAL_PROGRESSION: NOT CHANGED

## 2019-05-23 ASSESSMENT — PAIN DESCRIPTION - DESCRIPTORS
DESCRIPTORS: ACHING;CONSTANT;DISCOMFORT
DESCRIPTORS: ACHING;CONSTANT;DISCOMFORT;DULL
DESCRIPTORS: ACHING;CONSTANT
DESCRIPTORS: CONSTANT;DISCOMFORT;DULL
DESCRIPTORS: ACHING;CONSTANT;DISCOMFORT;DULL
DESCRIPTORS: ACHING;DISCOMFORT;DULL
DESCRIPTORS: ACHING;CONSTANT;DISCOMFORT
DESCRIPTORS: ACHING;CONSTANT;DISCOMFORT
DESCRIPTORS: CONSTANT;DISCOMFORT;DULL
DESCRIPTORS: ACHING;CONSTANT;DISCOMFORT;DULL
DESCRIPTORS: ACHING;CONSTANT;DISCOMFORT;DULL

## 2019-05-23 ASSESSMENT — PAIN DESCRIPTION - PAIN TYPE
TYPE: CHRONIC PAIN

## 2019-05-23 ASSESSMENT — PAIN DESCRIPTION - LOCATION
LOCATION: GENERALIZED
LOCATION: GENERALIZED
LOCATION: CHEST
LOCATION: GENERALIZED
LOCATION: GENERALIZED
LOCATION: CHEST

## 2019-05-23 ASSESSMENT — PAIN DESCRIPTION - ONSET
ONSET: ON-GOING

## 2019-05-23 ASSESSMENT — PAIN DESCRIPTION - ORIENTATION
ORIENTATION: MID

## 2019-05-23 NOTE — CONSULTS
Radiation Oncology      Olvin Angelo. Jose Atkinson MD MS DABR          Referring Physician: Dr. Deepa Lynn MD     Diagnosis: Mediastinal mass, h/o stage IV cervical cancer     Service: Radiation Oncology consultation     HPI:  Pb Bee is a pleasant 59year old female with Stage IV cervical cancer with mets to the adrenal glands, possible liver, who now presents with a new L hilar mass, shortness of breath, and hemoptysis. According to the patient, she is s/p intracavitary radiation (records note brachytherapy) for her cervical cancer and is currently undergoing chemo at the [facility]. Poor historian. KPS pre-treatment 60-70. Current smoker, 10+ pack year history. H/o CKD and COPD. Oncologic Timeline:    Stage IV cervical cancer    -[facility]-  39-year-old female known to Dr. Marck Ruiz with stage IIIB cervical cancer with PDL1 expression 50%. Treated with concurrent chemoRT with weekly Cisplatin followed by brachytherapy completed Aug 2017. Recurrent/stage IV disease into the L clavicle, abdominal and pelvic LAD and treated with Carbo/Taxol/Avastin. Progression on PET from Oct 2018. Intolerant to Navelbine. Currently on immunotherapy with Keytruda since 12/13/18, last dose on 2/14/19.            Past Surgical History:   Procedure Laterality Date    APPENDECTOMY      COLONOSCOPY N/A 3/13/2019    COLONOSCOPY WITH BIOPSY performed by Kalin Montero MD at [facility] 3/4/2019    HIP OPEN REDUCTION INTERNAL FIXATION performed by Naseem Kiser MD at [place] [address] Right     r hip surgery    LAPAROSCOPY      MOUTH SURGERY      NEPHROSTOMY Right     OTHER SURGICAL HISTORY  06/27/2017    Tandem and Ovoid insertion     OTHER SURGICAL HISTORY N/A 08/04/2017    TONSILLECTOMY      TONSILLECTOMY      URETER STENT PLACEMENT      kidney stents due to kidney failure       History reviewed. No pertinent family history.             Current Facility-Administered Medications   Medication Dose Route Frequency Provider Last Rate Last Dose    methylPREDNISolone sodium (SOLU-MEDROL) injection 40 mg  40 mg Intravenous 4 times per day Alli Pedro MD   40 mg at 05/23/19 0701    cefTRIAXone (ROCEPHIN) 1 g in sterile water 10 mL IV syringe  1 g Intravenous Q24H Alli Pedro MD        acetaminophen (TYLENOL) tablet 650 mg  650 mg Oral Q6H PRN Alli Pedro MD   650 mg at 05/23/19 0701    docusate sodium (COLACE) capsule 100 mg  100 mg Oral BID Alli Pedro MD   100 mg at 05/23/19 0857    gabapentin (NEURONTIN) tablet 600 mg  600 mg Oral TID Alli Pedro MD   600 mg at 05/23/19 0857    methadone (DOLOPHINE) tablet 10 mg  10 mg Oral Q8H PRN Alli Pedro MD   10 mg at 05/23/19 0906    metoclopramide (REGLAN) tablet 5 mg  5 mg Oral 4x Daily Alli Pedro MD   5 mg at 05/23/19 0857    oxybutynin (DITROPAN) tablet 5 mg  5 mg Oral Daily Alli Pedro MD   5 mg at 05/23/19 0857    sertraline (ZOLOFT) tablet 50 mg  50 mg Oral Daily Alli Pedro MD   50 mg at 05/23/19 0857    sodium chloride flush 0.9 % injection 10 mL  10 mL Intravenous 2 times per day Alli Pedro MD   10 mL at 05/23/19 0859    sodium chloride flush 0.9 % injection 10 mL  10 mL Intravenous PRN Alli Pedro MD   10 mL at 05/23/19 0702    magnesium hydroxide (MILK OF MAGNESIA) 400 MG/5ML suspension 30 mL  30 mL Oral Daily PRN Alli Pedro MD        ondansetron (ZOFRAN) injection 4 mg  4 mg Intravenous Q6H PRN Alli Pedro MD   4 mg at 05/23/19 0058    ipratropium-albuterol (DUONEB) nebulizer solution 1 ampule  1 ampule Inhalation 4x daily Alli Pedro MD   1 ampule at 05/23/19 0852    ipratropium-albuterol (DUONEB) nebulizer solution 1 ampule  1 ampule Inhalation Q4H PRN Alli Pedro MD        guaiFENesin-dextromethorphan (ROBITUSSIN DM) 100-10 MG/5ML syrup 5 mL  5 mL Oral Q4H PRN Kat Given, MD   5 mL at 05/23/19 0555    vancomycin (VANCOCIN) 750 mg in dextrose 5 % 250 mL IVPB  750 mg Intravenous Q12H Kat Given, MD        sodium polystyrene (KAYEXALATE) powder 15 g  15 g Oral Once Katey Spring MD        fentaNYL (DURAGESIC) 100 MCG/HR 1 patch  1 patch Transdermal Q72H Kat Given, MD        albuterol (PROVENTIL) nebulizer solution 2.5 mg  2.5 mg Nebulization Q4H PRN Kat Given, MD        morphine (PF) injection 2 mg  2 mg Intravenous Q4H PRN Kat Given, MD   2 mg at 05/23/19 0701    HYDROcodone-acetaminophen (NORCO) 5-325 MG per tablet 1 tablet  1 tablet Oral Q6H PRN Kat Given, MD   1 tablet at 05/23/19 0555         Allergies   Allergen Reactions    Latex Anaphylaxis     \"throat closes\"    Asa [Aspirin] Anaphylaxis     \"causes throat to close\"    Other Shortness Of Breath     Seafood    Contrast [Barium-Containing Compounds] Nausea And Vomiting         Social History     Socioeconomic History    Marital status:       Spouse name: None    Number of children: None    Years of education: None    Highest education level: None   Occupational History    None   Social Needs    Financial resource strain: None    Food insecurity:     Worry: None     Inability: None    Transportation needs:     Medical: None     Non-medical: None   Tobacco Use    Smoking status: Current Every Day Smoker     Packs/day: 0.25     Years: 40.00     Pack years: 10.00     Types: Cigarettes    Smokeless tobacco: Never Used   Substance and Sexual Activity    Alcohol use: No     Comment: 1 x a month    Drug use: Yes     Types: Marijuana     Comment: couple times a month    Sexual activity: None   Lifestyle    Physical activity:     Days per week: None     Minutes per session: None    Stress: None   Relationships    Social connections:     Talks on phone: None     Gets together: None     Attends [employer] service: None     Active member of club or organization: None     Attends meetings of clubs or organizations: None     Relationship status: None    Intimate partner violence:     Fear of current or ex partner: None     Emotionally abused: None     Physically abused: None     Forced sexual activity: None   Other Topics Concern    None   Social History Narrative    None         Review of Systems - Negative except shortness of breath, hemoptysis       Physical Exam   Constitutional: She is oriented to person, place, and time. Cachetic    HENT:   Head: Normocephalic and atraumatic. Right Ear: External ear normal.   Left Ear: External ear normal.   Eyes: EOM are normal.   Neck: Normal range of motion. Cardiovascular: Normal rate. Pulmonary/Chest: No stridor. No respiratory distress. Neurological: She is alert and oriented to person, place, and time. Skin: Skin is warm and dry. No rash noted. Psychiatric: She has a normal mood and affect. Her behavior is normal.           Imaging:    CTA chest 5/22/19:  Impression   A large lobulated mass in the left hilum abutting the hilar   structures, the aorta and invading the left main pulmonary artery and   partially occluding the left upper lobe pulmonary artery and pulmonary   vein on the left side. Findings are concerning for malignancy either   primary or metastasis. There is possible metastatic involvement in the   right and left lungs,  adrenal metastasis and possibly liver   metastasis. No other intrinsic pulmonary embolism or identified. CT Abd/Pelvis 5/22/19:  Impression       1.  THICK WALLED ENHANCING BLADDER WHICH COULD REFLECT CYSTITIS,   CORRELATION WITH URINALYSIS IS RECOMMENDED. 2.  INTERVAL INCREASE IN SIZE OF BILATERAL ADRENAL METASTASES WITH   INTERVAL DEVELOPMENT OF CENTRAL NECROSIS. 3.  INTERVAL ENLARGEMENT OF RETROPERITONEAL AND MESENTERIC LYMPH NODES   AS DESCRIBED.    4.  INDETERMINATE LOW-ATTENUATION LESION IN THE LIVER, IS UNCHANGED   FROM 12/15/2018. 5.  MILD PROMINENCE OF THE INTRAHEPATIC BILIARY TREE WITHOUT FOCAL   OBSTRUCTION OR DILATION OF THE EXTRAHEPATIC BILIARY TREE, LIKELY   PHYSIOLOGIC. 6.  NODULAR OPACITIES IN THE LEFT LOWER LOBE IN A TREE-IN-BUD   DISTRIBUTION SUGGESTING INFECTIOUS BRONCHIOLITIS, THESE APPEAR TO BE   NEW FROM 4/29/2019. FOLLOW-UP IS RECOMMENDED TO ENSURE RESOLUTION. 7.  BILATERAL SACRAL INSUFFICIENCY FRACTURES         Past Medical History:   Diagnosis Date    COPD (chronic obstructive pulmonary disease) ([address]     Hyperlipidemia     Hypertension     PMB (postmenopausal bleeding)     Squamous cell carcinoma 02/06/2017    Invasive non-keratinizing squamous cell carcinoma with extensive squamous cell/severe dysplasia LAY 3           Radiation Safety andTreatment Support:  -previous Radiation history: Yes  -history of connective tissue disease: No  -history of autoimmune disease:No  -pregnant: not applicable  -nutrition consult prior to [address]: No  -PEG: No  -Dental evaluation prior to treatment:No  -Social Work requested: Yes  -Oncology Nurse navigator requested: Yes  -pre + post treatment PT / Rehab / PM+R evaluation considered: Yes  -ICD: No   -ICD brand:       Assessment and Plan: Ms. No Licona is a pleasant and cooperative 59year old female with a recent diagnosis of stage IV cervical cancer with mets to the adrenal glands, possible liver and presumed chest/mediastinum although lung primary cannot be ruled out. Given her disease course, recommend palliative external beam radiation to the hilar mass. Her prognosis is grim and it was discussed that radiation would be palliative in nature, to improve her quality of life. Agree with medical oncology that mediastinal mass biopsy does not add to her treatment given her prognosis. Recommend palliative medicine see her to discuss symptomatic management/quality of life/hospice.         Electronically signed by Edelmira Smith DO on 5/23/2019 at 12:19 PM -----      Attending Supervising Physicians Attestation Statement  I was present with the resident physician during the history and exam. I discussed the findings and plans with the resident physician and agree as documented in his note . Electronically signed by Pam Dailey MD on 5/23/19 at 2:51 PM      The risks, benefits, alternatives, process and logistics of external beam radiation were reviewed today. We answered all of the patient's questions to the best of our ability. Arlene Kurtz verbalized understanding and seemed satisfied. Radiation planning will commence within 1 hour; the next step in management being the simulation scan, with external beam radiation to commence in a timely fashion thereafter. It was a pleasure meeting Arlene Kurtz  today and we appreciate the referral and opportunity to be involved in Her care. We had an extensive discussion today regarding the course to date (including a focused review of the applicable radiographic and laboratory information), multidisciplinary approach to cancer care, and indications for external beam radiation therapy as a component therein. A literature review and multidisciplinary discussion was performed while seeing this patient due to the complexity of the medical decision making in this case. I personally spent greater than 45 minutes with this patient and performed the complete history and physical as above at todays visit, at least 45 minutes was in direct  regarding disease management.          -urgent, start 4 fraction course TODAY 5/23/19 / RT IP or OP, D/C per primary. Fabian Rankin. Juan Wilson MD [place] [address] Oncology  [place]:  [phone]   FAX:    [phone]  [place]: [phone]   FAX:  [phone]      NOTE: This report was transcribed using voice recognition software. Every effort was made to ensure accuracy; however, inadvertent computerized transcription errors may be present.

## 2019-05-23 NOTE — PROGRESS NOTES
ADVANCED CARE PLANNING    Patient Name: Mago Abraham       YOB: 1960              MRN:    [id number]  Admission Date:  5/22/2019  3:30 PM    Active Diagnoses: Active Problems:    Malignant neoplasm of exocervix (HCC)    Hematuria    Goals of care, counseling/discussion    COPD exacerbation (HCC)    Mass of left lung    Mediastinal lymphadenopathy    Recurrent UTI    Elevated troponin    Mass of both adrenal glands (HCC)    Severe malnutrition (HCC)    Left leg swelling    Stage 3 chronic kidney disease (HCC)    Acute respiratory failure (HCC)    PNA (pneumonia)    Mucus plugging of bronchi  Resolved Problems:    * No resolved hospital problems. *      These active diagnoses are of sufficient risk that focused discussion on advanced care planning is indicated in order to allow the patient to thoughtfully consider personal goals of care; and, if situations arise that prevent the patient to personally give input, to ensure appropriate representation of their personal desire for different levels and levels of care. Persons present in discussion: Corina Dennison MD, Family members: none    Discussion: I reviewed her admission for multiple medical problems including stage IV cervical cancer, failed chemotherapy and radiation with worsening lung mass, acute respiratory failure, current UTIs, advanced emphysema with COPD exacerbation and elevated troponin. her desires for ongoing aggressive care, including potential intubation and mechanical ventilation ; also discussed who would speak on her behalf should she be unable to do so and discussed what conversations she has had with her family so they understand her desires if such a situation occurred now or in the future. I presented and explained the availability of our palliative care team to her, including the availability of advanced directives forms which she can review with her family and then fill out if they so wish.  Patient has 2 sons that are local([place] and Man [place]). She does not have any formal advanced directive or living will made out. She has designated both of her sons as POA. She expresses wishes to be DNR CC. She has not discussed this with her family. She is tearful during this conversation. She expresses desire to meet with palliative care team to continue to discuss this. Time Spent on Advanced Planning Documents: 20 minutes    Electronically signed by Yovanny Franco MD on 5/23/2019 at 12:27 AM    NOTE: This report was transcribed using voice recognition software. Every effort was made to ensure accuracy; however, inadvertent computerized transcription errors may be present.

## 2019-05-23 NOTE — PLAN OF CARE
Problem: Malnutrition  (NI-5.2)  Goal: Food and/or Nutrient Delivery  Add Ensure BID  Description  Individualized approach for food/nutrient provision.   Outcome: Met This Shift

## 2019-05-23 NOTE — CARE COORDINATION
Spoke with Pt about Discharge plans. Pt refuses SNF. Pt will accept [facility] if SW can find a company to come out. Discharge Plan is home. Pt receives services at the [facility].

## 2019-05-23 NOTE — CONSULTS
[place]  Department of Pulmonary, Critical Care and Sleep Medicine  Consult Note    Patient: Jame Steward  MRN: [id number]  : 1960    Encounter Time: 11:04 AM     Date of Admission: 2019  3:30 PM    Primary Care Physician: Forest Segundo MD    Reason for Consultation: COPD and lung mass     HISTORY OF PRESENT ILLNESS : Jame Steward 59 y.o. female was seen in consultation regarding the above chief compliant. 59 y.o. female who presented to [facility] with PMH of uterine cancer status post chemotherapy and radiation, last chemo treatment was last week, cancer related chronic pain, narcotic dependent, ongoing tobacco dependence, chronic kidney disease stage III, severe protein energy malnutrition and bilateral ureteric stents. Patient was just discharged from the hospital on 19 following a one-week stay. She had abdominal pain, migrating right nephrostomy tube that was replaced and complicated UTI. She is saw ID and was treated with Zosyn, urine culture was negative. She came back tonight with shortness of breath that has been going on for the past 2 days. She also complains about chest pain which is left-sided, sharp, severe, on and off pain that is associated with shortness of breath, wheezing and orthopnea, no relief of the pain with morphine that she just received. She has had subjective fever, diaphoresis, cough productive of yellowish sputum and sometimes hemoptysis. She also complains about generalized abdominal pain, nausea and vomiting. She has had dysuria and hematuria which is not new. She has leg swelling involving the left more than right. No change in bowel habits.     Medical records have been reviewed and summarized. Vital signs are notable for respiratory rate of 20 and saturation of 98% on 2 L. She does not use oxygen at baseline.  Labs shows troponin 0.18, creatinine 1.3, potassium 5.6, urinalysis with greater than 20 white cells, positive for Cardiovascular:  + chest discomfort, palpitations. Respiratory:   + cough, No wheezing      No sputum production. No hemoptysis, pleuritic pain. Gastrointestinal:  No abdominal pain, appetite loss, blood in stools. No hematemesis  Genitourinary:  No dysuria, trouble voiding or hematuria. No nocturia. Musculoskeletal:   No weakness or joint complaints. Integumentary: No rashes or pruritis. Neurological:  No headache, numbness or tingling. Psychiatric:   No effect on mood, memory, mentation, or  behavior. No anxiety or depression. Endocrine:   No excessive thirst, fluid intake, or urination. No tremor. Hematologic: No abnormal bruising or bleeding. Lymphatic:  No swollen lymph nodes. Immunologic:  No hives or hx of anaphaxsis. OBJECTIVE:     PHYSICAL EXAM:   VITALS:     Intake/Output Summary (Last 24 hours) at 5/23/2019 1104  Last data filed at 5/23/2019 1011  Gross per 24 hour   Intake 250 ml   Output 150 ml   Net 100 ml        CONSTITUTIONAL:    A&O x 3, NAD  SKIN:     No rash, No suspicious lesions or skin discoloration  HEENT:     EOMI, MMM, No thrush  NECK:    No bruits, No JVP apprechiated  CV:      Sinus,  No murmus, No Rubs, No gallops  PULMONARY:   Aysmeteric Couse BS,  No Wheezing, No Rales, No Rhonchi      No noted egophony  ABDOMEN:     Soft, non-tender. BS normal. No R/R/G  EXT:    No deformities . No clubbing. No lower extremity edema, No venous stasis  PULSE:   Appears equal and palpable.   PSYCHIATRIC:  Seems appropriate, No acute psycosis, scared  MS:    No fractures, No gross weakness  NEUROLOGIC:   The exam is non-focal     DATA: IMAGING & TESTING:     LABORATORY TESTS:    CBC:   Lab Results   Component Value Date    WBC 5.5 05/23/2019    RBC 3.41 05/23/2019    HGB 10.3 05/23/2019    HCT 32.7 05/23/2019    MCV 95.9 05/23/2019    MCH 30.2 05/23/2019    MCHC 31.5 05/23/2019    RDW 15.5 05/23/2019     05/23/2019    MPV 9.1 05/23/2019 CMP:    Lab Results   Component Value Date     05/23/2019    K 5.2 05/23/2019    K 5.1 05/23/2019     05/23/2019    CO2 23 05/23/2019    BUN 18 05/23/2019    CREATININE 1.4 05/23/2019    GFRAA 46 05/23/2019    LABGLOM 38 05/23/2019    GLUCOSE 144 05/23/2019    PROT 7.5 05/22/2019    LABALBU 3.4 05/22/2019    CALCIUM 8.3 05/23/2019    BILITOT <0.2 05/22/2019    ALKPHOS 93 05/22/2019    AST 21 05/22/2019    ALT 8 05/22/2019        PRO-BNP:   Lab Results   Component Value Date    PROBNP 3,660 (H) 05/23/2019      ABGs:   Lab Results   Component Value Date    PH 7.394 05/23/2019    PO2 49.6 05/23/2019    PCO2 37.2 05/23/2019     Hemoglobin A1C: No components found for: HGBA1C    IMAGING:  Imaging tests were completed and reviewed and discussed radiology and care team involved and reveals   Ct Abdomen Pelvis Wo Contrast    Addendum Date: 5/9/2019    Reviewed the previous CT abdomen and pelvis dated of April 29, 2019. Also reviewed the previous PET nuclear medicine scan study dated May 30 and October 8, 2018 and previous CAT scan examinations of the abdomen and pelvis of January 11, 2017, November 10, 2018, December 15, 2018. Patient has history of cervical malignancy. Ureter stents have been applied to decompress the distal ureters. I could not identified a conspicuously mesenteric mass or bulky adenopathy. As reported on the study of April 2019, 2018 there is a mass lesion in the region of the left adrenal gland which size has increased since the previous examination of December 15. And also there is a now a fullness or mild enlargement of the right adrenal gland which was not seen previously. Some adenopathy in the upper periaortic region that was seen on the PET nuclear medicine bone scan of 2018, cannot be conspicuously identified at the around of the right diaphragma crura or in the region of the celiac axis/mesenteric root.  There is a lymph node in the left paraortic region which size also has increased since the study of December 15, presently measures 13 x 11 mm are. These findings are supportive for metastatic disease to the retroperitoneal region. Adrenals lesions can be on the basis of metastasis or can be large retroperitoneal bulky adenopathy obscuring the anatomy of the left adrenal.  The right adrenal gland had enlarged since the previous CT of 2018. The study of   was done without IV contrast. If the patient cannot have IV contrast, MRI study can be helpful to better display the upper retroperitoneal structures. Also a repeated PET nuclear medicine scan can be helpful for monitoring the abnormal lymph nodes that were observed on the previous examinations. Result Date: 2019  Patient MRN:  [id number] : 1960 Age: 59 years Gender: Female Order Date:  2019 6:00 PM TECHNIQUE/NUMBER OF IMAGES/COMPARISON/CLINICAL HISTORY: CT abdomen and pelvis no contrast Axial images were obtained with sagittal and coronal reconstructions are. Comparison study December 15, 2018. CLINICAL HISTORY: 8year-old the female patient with the abdominal pain. Images: 209. FINDINGS: This has a catheter draining the left ureter. The catheter is in proper position. There is a percutaneous catheter draining the right kidney. The catheter is in proper position. There are some fullness of the collecting system of both kidneys but also kidneys or in the compressed by the catheters. There is a large mass lesion in the left adrenal gland which has increased size since the previous examination presently measures 4.5 x 2.8 cm previously it measured 2.6 x 2.2 cm there are. There is also enlargement of the right adrenal gland more prominent since the previous study. Also as observed previously there is midline retroperitoneal adenopathy with several enlarged lymph nodes around the aorta and IVC above and below the hyoid the kidneys. The liver has normal size and.  The liver has a diffuse increased density up evidence of organomegaly or abnormal calcifications seen. The osseous structures of the abdomen and pelvis appear to be normal. 3 fixation screws seen in the right hip which was seen before and appears to be unchanged. NON-SPECIFIC GAS PATTERN There is bilateral ureteral stents in place as described above. Ct Abdomen Pelvis W Iv Contrast Additional Contrast? None    Result Date: 2019  EXAMINATION:  CT ABDOMEN AND PELVIS WITH IV CONTRAST Patient MRN:  [id number] : 1960 Age: 59 years Gender: Female Order Date:  2019 5:30 PM EXAM: CT ABDOMEN PELVIS W IV CONTRAST NUMBER OF IMAGES \ views:  200 INDICATION:  Hx metasttic cancer new onset abdominal pain COMPARISON: 2019, 12/15/2018 TECHNIQUE: CT of the abdomen and pelvis was performed using standard technique, scanning from just above the dome of the diaphragm to the symphysis pubis. Contrast: IV:  110 ml of Isovue-370 CT Radiation dose: Integrated Dose-length product (DLP) for this visit =  237 mGy*cm. CT Dose Reduction Employed: Yes RESULT: Liver: 1.7 x 1.4 cm low-attenuation lesion in the anterior left hepatic lobe (series 3 image 14 is not significantly changed since 12/15/2018. No new hepatic lesion. Biliary: Mild prominence of the intrahepatic biliary ducts, no gallbladder dilation or extrahepatic biliary ductal dilation. No focal obstruction identified. Gallbladder is unremarkable. Spleen: No mass. No splenomegaly. Pancreas: No mass or duct dilation. Adrenals: Bilateral adrenal masses have increased in size in the interval. The left adrenal mass now measures 5.0 x 4.3 cm and contains a large area of central necrosis, previously measuring approximately 2.6 x 3.2 cm without significant necrosis. The right adrenal mass measures 4.3 x 1.8 cm, previously subcentimeter also with more central necrosis in the interval. Enhancing groin lymph nodes noted. Kidneys: Bilateral ureteral stents are in place.  There is a 1 cm interpolar left renal cyst. Kidneys enhance symmetrically. GI tract: No dilation or wall thickening. Large volume of stool in the colon. Lymph nodes: Enlarged left periaortic lymph node measures 1.4 x 1.8 cm, this is increased in the interval. Additional left para-aortic lymph node measures 1.3 x 2.0 cm (series 3 image 33) this is also increased in size in the interval. Peripherally enhancing, essentially low-attenuation lesion along the left pelvic sidewall, likely partially necrotic node measures 1.4 x 3.4 cm (3:60) this was not well appreciated on the prior examination due to the lack of intravenous contrast. Mildly enlarged mesenteric lymph node measures 1 cm in short axis (3:36) Mesentery/Peritoneum: Mesenteric edema. No focal fluid collection. Retroperitoneum: Lymphadenopathy as described above. No focal Vasculature: The celiac axis and SMA are patent. The portal vein and branches, splenic vein, SMV, and hepatic veins are patent. Arterial atherosclerotic disease without aneurysm. Pelvis: No mass, ascites or fluid collection. The bladder is thick walled and there is mild mucosal enhancement. Double-J stents terminate in the bladder. Small amount of contrast layers dependently. Bones/Soft Tissues: Postsurgical changes of right hip reduction with 3 cannulated, partially threaded screws through the right femoral neck. There are bilateral sacral insufficiency fractures which are healing. Lumbar spine is unremarkable. No destructive osseous lesion remote left-sided rib fractures. Lower thorax: Nodular opacities in the left lower lobe in a tree-in-bud distribution likely reflective of infectious bronchiolitis. Follow-up is recommended. 1.  THICK WALLED ENHANCING BLADDER WHICH COULD REFLECT CYSTITIS, CORRELATION WITH URINALYSIS IS RECOMMENDED. 2.  INTERVAL INCREASE IN SIZE OF BILATERAL ADRENAL METASTASES WITH INTERVAL DEVELOPMENT OF CENTRAL NECROSIS. 3.  INTERVAL ENLARGEMENT OF RETROPERITONEAL AND MESENTERIC LYMPH NODES AS DESCRIBED.  4. INDETERMINATE LOW-ATTENUATION LESION IN THE LIVER, IS UNCHANGED FROM 12/15/2018. 5. MILD PROMINENCE OF THE INTRAHEPATIC BILIARY TREE WITHOUT FOCAL OBSTRUCTION OR DILATION OF THE EXTRAHEPATIC BILIARY TREE, LIKELY PHYSIOLOGIC. 6.  NODULAR OPACITIES IN THE LEFT LOWER LOBE IN A TREE-IN-BUD DISTRIBUTION SUGGESTING INFECTIOUS BRONCHIOLITIS, THESE APPEAR TO BE NEW FROM 2019. FOLLOW-UP IS RECOMMENDED TO ENSURE RESOLUTION. 7.  BILATERAL SACRAL INSUFFICIENCY FRACTURES    Xr Chest Portable    Result Date: 2019  Patient MRN:  [id number] : 1960 Age: 59 years Gender: Female Order Date:  2019 3:45 PM EXAM: XR CHEST PORTABLE COMPARISON: May 11, 2019 INDICATION:  shortness of breath shortness of breath FINDINGS: The heart is normal in size. No focal airspace opacity. There is no pleural effusion. There is no pneumothorax. No free air beneath diaphragm. Right Mediport present with distal tip at location of SVC. No airspace opacities or pleural effusion. Xr Chest Portable    Result Date: 2019  Patient MRN: [id number] : 1960 Age:  59 years Gender: Female Order Date: 2019 12:15 PM Exam: XR CHEST PORTABLE Number of Views: 1 Indication:   Chest pain. Body pain. Comparison: Chest x-ray 3/9/2019 Findings: There is a normal cardiomediastinal silhouette with stable appearance of a Mediport involving the right chest. Nodular density overlying the left lung base measured at approximately 1.2 x 1.0 cm. Finding has been seen on previous exams. . No pneumothorax. .     1. Nodular density left lower lung could be reflective of nipple shadow versus metastatic malignancy. Clinical correlation recommended. Consider repeat radiographs with nipple markers. Cta Chest W Contrast    Result Date: 2019  Patient MRN:  [id number] : 1960 Age: 59 years Gender: Female Order Date:  2019 5:30 PM EXAM: CTA CHEST W CONTRAST number of images 406 including MIP coronal and sagittal reconstruction images. report and concur with these findings. Ir Guided Ureteral Stent Place Thru Exist Tract    Result Date: 2019  Patient MRN:  [id number] : 1960 Age: 59 years Gender: Female Order Date:  2019 10:30 AM Exam: IR GUIDED URETERAL STENT PLACE THRU EXIST TRACT Indication: Right ureteral obstruction. Placement of right ureteral stent requested. Procedures: 1. Nephrostogram through existing percutaneous nephrostomy catheter. 2. Fluoroscopic imaging-guided exchange of right percutaneous nephrostomy catheter. 3. Fluoroscopic imaging-guided placement of right double-J ureteral stent. 4. Completion nephrostogram through newly placed nephrostomy catheter. Number of Images:  14 Fluoroscopy Time: 10 minutes 31 seconds Contrast: Optiray-320 65 mL into the renal collecting system (i.e., nonvascular) Anesthesia: Monitored anesthesia care Medications: Lidocaine (2%) subcutaneous. Comparison: None. PROCEDURE DETAILS AND FINDINGS: Informed consent was obtained from the patient. The details of the procedure and its risks, benefits, and alternatives were explained. The risks include, but are not limited to, injury to the kidney, bleeding, and infection. The patient indicated understanding of what was explained, was given the opportunity to ask questions, and gave permission to proceed. The procedure was performed under monitored anesthesia care (MAC) provided by Anesthesiology staff. The patient was continuously monitored throughout the procedure. With the patient positioned prone on the exam table, the right flank and indwelling nephrostomy catheter were sterilely prepped and draped. A preprocedure timeout was performed. A fluoroscopic  image was obtained. A nephrostogram was performed with injection of contrast through the existing percutaneous nephrostomy catheter, confirming catheter position within the renal collecting system. The nephrostomy catheter was removed over an Amplatz wire.  A 10-Faroese vascular sheath was advanced over the guidewire along the percutaneous tract and positioned under fluoroscopic imaging. The inner dilator was removed from the sheath. A 5-Norwegian KMP catheter was advanced over a Glidewire through the sheath and, under fluoroscopic imaging, directed down the ureter and into the bladder. The Glidewire was exchanged for a Bentson wire, which was used to estimate the length of the ureter and removed. The Bentson wire was replaced with a second Amplatz wire. Under fluoroscopic imaging, an 8-Norwegian by 22-cm double-J ureteral stent was advanced over one of the Amplatz wires and deployed with its distal pigtail in the bladder and proximal pigtail in the renal pelvis. One Amplatz wire was removed as the ureteral stent was fully deployed. Next, the vascular sheath was removed over the remaining Amplatz wire. A new 8-Norwegian nephrostomy catheter was advanced over the Amplatz wire into the right renal collecting system. The catheter pigtail was formed in the renal pelvis as the Amplatz wire and inner dilator were removed. A nephrostogram was performed through the newly placed nephrostomy catheter, confirming satisfactory ureteral stent and nephrostomy catheter positions. Contrast could be seen along the length of the ureter into the bladder. There were no complications. Fluoroscopic image-guided insertion of right double-J ureteral stent and exchange of right percutaneous nephrostomy catheter without complication. Assessment:   1. Lung mass  2. COPD  3. Weight loss  4. Emphysema  5. Adrenal Mass        Plan:   1. NPO after midnight for biopsy at noon-evert,   2. Discussed the procedure and risk, benefits, alternative - she agrees  3. Agree with steroids and bronchodilator  4. Spiritual support   5. Dietary supplements  6. Oncology to follow  7. She want to be part of the law suit for [employer] - maybe oncology could provide info?         Jaycee Pham, MPH, [facility]  Pulmonary, Critical Care and Sleep Medicine

## 2019-05-23 NOTE — PROGRESS NOTES
Oncology consult placed via [facility] serve to  [facility]. Dr Zay Santana taking calls.

## 2019-05-23 NOTE — PROGRESS NOTES
Pharmacy Consultation Note  (Antibiotic Dosing and Monitoring)    Initial consult date: 2019  Consulting physician: Dr. Edgard Waldrop  Drug(s): vancomycin  Indication: PNA  Age/Gender IBW DW  Allergy Information   58 y.o./F; 160 cm  42.8 kg  Latex; Asa [aspirin]; Other; and Contrast [barium-containing compounds]          Date  WBC BUN/CR Drug/Dose Time   Given Level(s)   (Time) Comments      Vancomycin 750 mg x1 0117                                  Intake/Output Summary (Last 24 hours) at 2019 1218  Last data filed at 2019 1011  Gross per 24 hour   Intake 250 ml   Output 150 ml   Net 100 ml       Temp max: Temp (24hrs), Av.8 °F (36.6 °C), Min:96.4 °F (35.8 °C), Max:98.8 °F (37.1 °C)      Cultures:  available culture and sensitivity results were reviewed in [facility]    Urine: 10-100K mixed marnie    SPUEX: abundant PMNs; rare yeast, rare GP diplococci,                                         rare GPC in chains    Assessment:  · Consulted by Dr. Edgard Waldrop to dose/monitor vancomycin. · Goal trough level:  15-20 mCg/mL. ·  59 yo/F admitted from home for SOB. · PMH: metastatic cervical CA (mets to lung (or possible primary lung, too (smoker)), liver, adrenal) with suspected PNA. · Empiric ATBs (vancomycin and ceftriaxone) started on admission. Plan:  · Start vancomycin 750 mg q12h. · Will check vancomycin trough level when steady state is attained if vanco continues. · Pharmacist will follow and monitor/adjust dosing as necessary.     Chasidy Amador, PharmD  2019  12:24 PM  Pager: [phone]

## 2019-05-23 NOTE — PROGRESS NOTES
Pharmacy Consultation Note  (Antibiotic Dosing and Monitoring)      Pharmacy is following for Vancomycin dosing. Allergies:  Latex; Asa [aspirin]; Other; and Contrast [barium-containing compounds]    59 y.o. female    Ht Readings from Last 1 Encounters:   05/22/19 5' 3\" (1.6 m)     Wt Readings from Last 1 Encounters:   05/22/19 92 lb (41.7 kg)       Recent Labs     05/22/19  1620   WBC 6.6       Recent Labs     05/22/19  1620   BUN 18   CREATININE 1.3*       CrCl cannot be calculated (Unknown ideal weight.). No intake or output data in the 24 hours ending 05/23/19 0024    Cultures:  available culture and sensitivity results were reviewed in [facility]      Assessment:  Consulted by Dr. Derik Monson to dose/monitor vancomycin  Estimated CrCl =  30 mL/min  Goal trough level = 15-20 mcg/mL    Plan: Will continue withy the original dose of 750mg ONCE.  Clinical pharmacy will take over dosing later this AM.  Monitor renal function   Clinical pharmacy will follow up and complete full consult note      Jeanmarie Yo [facility] 5/23/2019 12:24 AM

## 2019-05-23 NOTE — CONSULTS
Palliative Care Department  Palliative Care Initial Consult  Provider: [facility] [place] days prior to Consult: Hospital Day: 2    Referring Provider:  Emmanuel Narvaez MD    Reason for Consult:  [x]  Code status Discussion  [x]  Assist with goals of care  []  Psychosocial support  []  Symptom Management      Chief Complaint: shortness of breath    Subjective:     HPI:  Liam Santamaria is a 59 y.o. female with significant past medical history of COPD, hypertension, hyperlipidemia, advanced cervical cancer, right ureteral stent and nephrostomy tube, chronic kidney disease, severe protein calorie malnutrition who presented to the ED with shortness of breath. She was admitted to the hospital with COPD exacerbation, acute cystitis with hematuria, elevated troponins and lung mass. She was just discharged from the hospital 5/6. She was seen by oncology and review of imaging reveals progression of disease. Oncology note indicates patient has very poor prognosis and identified palliative radiation to lung mass as an option to help his symptoms and quality of life. Pulmonary evaluated and there is question of her pursuing a bronchoscopy for biopsy of new lung mass. Patient is sitting up in bed currently. Her son Radha Garcia is present. Patient identifies that she was told she has a new lung cancer. She will be starting radiation or radiation planning today and is getting ready to go. She has her baseline pain however denies any new or worsening pain. She denies current shortness of breath it's been improved. She denies nausea, vomiting, diarrhea, constipation. She has followed outpatient with the [facility] and Dr. Josie Ahuja. During previous admissions palliative medicine has not adjusted the pain medications and the patient wished to follow up with Dr. Josie Ahuja.     Goals of care: provide comfort care/support/palliation/relieve suffering and continue current management     Code Status: full code     Advanced Directives: none     Surrogate/Legal NOK: wants her sons, AD provided didn't complete  Contacts: Rebel Anderson )son) [phone],   Arti Bruce (son) [phone]  Liam Zavala     Spiritual assessment: No spiritual distress identified      Bereavement and grief: to be determined        Past Medical History:   Diagnosis Date    COPD [facility]chronic obstructive [facility] disease) [facility] [address]     Hyperlipidemia     Hypertension     PMB (postmenopausal bleeding)     Squamous cell carcinoma 02/06/2017    Invasive non-keratinizing squamous cell carcinoma with extensive squamous cell/severe dysplasia LAY 3       Past Surgical History:   Procedure Laterality Date    APPENDECTOMY      COLONOSCOPY N/A 3/13/2019    COLONOSCOPY WITH BIOPSY performed by Trice Franklin MD at [address] Right 3/4/2019    HIP OPEN REDUCTION INTERNAL FIXATION performed by Tom Barrera MD at [place] [address] Right     r hip surgery    LAPAROSCOPY      MOUTH SURGERY      NEPHROSTOMY Right     OTHER SURGICAL HISTORY  06/27/2017    Tandem and Ovoid insertion     OTHER SURGICAL HISTORY N/A 08/04/2017    TONSILLECTOMY      TONSILLECTOMY      URETER STENT PLACEMENT      kidney stents due to kidney failure       History reviewed. No pertinent family history. Social history:   status: no  Marital status:    Living status: with family:  grandchildren   Work history: unknown      History obtain from EMR    Current Medications:  Inpatient medications reviewed: yes  Home Medications reviewed: yes    Allergies   Allergen Reactions    Latex Anaphylaxis     \"throat closes\"    Asa [Aspirin] Anaphylaxis     \"causes throat to close\"    Other Shortness Of Breath     Seafood    Contrast [Barium-Containing Compounds] Nausea And Vomiting       Review of Systems:  As per HPI remaining review systems negative    Objective:   PHYSICAL EXAM: Nuno Haynes  Palliative Medicine    Thank you for allowing Palliative Medicine to participate in the care of Alan Smart. Note: This report was completed using computerize voiced recognition software. Every effort has been made to ensure accuracy; however, inadvertent computerized transcription errors may be present.

## 2019-05-23 NOTE — PROGRESS NOTES
Hospitalist Progress Note      PCP: Yodit Barth MD    Date of Admission: 5/22/2019  Days in the hospital: 1    Chief Complaint: SOB    Hospital Course:     Admitted for Acute respiratory failure with hypoxia and new oxygen requirement. She has metastatic disease in her lungs surrounding the left main bronchus. History of Metastatic cervical cancer. Worsening metastatic disease despite chemotherapy and radiation. Pulmonary, palliative, oncology were consulted. Subjective  Patient seen and examined at bedside. NAD, on RA, no overnight events. Patient denies any fevers, chills, abd pain,  nausea, vomiting. Medications:  Reviewed    Infusion Medications   Scheduled Medications    methylPREDNISolone  40 mg Intravenous 4 times per day    cefTRIAXone (ROCEPHIN) IV  1 g Intravenous Q24H    docusate sodium  100 mg Oral BID    [START ON 5/24/2019] fentaNYL  1 patch Transdermal Q72H    gabapentin  600 mg Oral TID    metoclopramide  5 mg Oral 4x Daily    oxybutynin  5 mg Oral Daily    sertraline  50 mg Oral Daily    sodium chloride flush  10 mL Intravenous 2 times per day    ipratropium-albuterol  1 ampule Inhalation 4x daily    vancomycin  750 mg Intravenous Q12H     PRN Meds: acetaminophen, methadone, sodium chloride flush, magnesium hydroxide, ondansetron, ipratropium-albuterol, guaiFENesin-dextromethorphan, albuterol, morphine, HYDROcodone 5 mg - acetaminophen      Intake/Output Summary (Last 24 hours) at 5/23/2019 0835  Last data filed at 5/23/2019 0518  Gross per 24 hour   Intake 250 ml   Output 150 ml   Net 100 ml       Exam:    BP (!) 146/73   Pulse 61   Temp 96.4 °F (35.8 °C) (Temporal)   Resp 18   Ht 5' 3\" (1.6 m)   Wt 94 lb 6.4 oz (42.8 kg)   SpO2 96%   BMI 16.72 kg/m²     General appearance:   middle-age female, chronically ill appearing and weak, in moderate respiratory distress, appears stated age and cooperative. Diaphoretic. Afebrile.    HEENT:  Normal cephalic, atraumatic without obvious deformity. Pupils equal, round, and reactive to light. Extra ocular muscles intact. Conjunctivae/corneas clear. Neck: Supple, with full range of motion. jugular venous distention. Trachea midline. Respiratory: Increased work of breathing. diffuse Rales/Wheezes/Rhonchi throughout the lungs   Cardiovascular:  Regular rate and rhythm with normal S1/S2 without murmurs, rubs or gallops. Abdomen:Full, firm, diffusely tender  with normal bowel sounds. Right nephrostomy catheter in place. Musculoskeletal:  No clubbing/cyanosis, 2+ edema right leg. limited  range of motion without deformity. Skin: Skin color, texture, turgor normal.  No rashes or lesions. Neurologic:  Neurovascularly intact without any focal sensory/motor deficits. Cranial nerves: II-XII intact, grossly non-focal.  Psychiatric:  Alert and oriented, thought content appropriate, normal insight  Capillary Refill: Brisk,< 3 seconds   Peripheral Pulses: +2 palpable, equal bilaterally           Labs:   Recent Labs     05/22/19  1620 05/23/19  0624   WBC 6.6 5.5   HGB 12.0 10.3*   HCT 37.6 32.7*    315     Recent Labs     05/22/19  1620 05/22/19  1840 05/23/19  0624     --  135   K 5.6* 5.0 5.2*  5.1*     --  100   CO2 22  --  23   BUN 18  --  18   CREATININE 1.3*  --  1.4*   CALCIUM 9.0  --  8.3*     Recent Labs     05/22/19  1620   AST 21   ALT 8   BILITOT <0.2   ALKPHOS 93     Recent Labs     05/23/19  0624   INR 1.1     Recent Labs     05/22/19  1620   TROPONINI 0.18*       Imaging:  CTA CHEST W CONTRAST   Final Result   A large lobulated mass in the left hilum abutting the hilar   structures, the aorta and invading the left main pulmonary artery and   partially occluding the left upper lobe pulmonary artery and pulmonary   vein on the left side. Findings are concerning for malignancy either   primary or metastasis.  There is possible metastatic involvement in the   right and left lungs,  adrenal metastasis and possibly liver   metastasis. No other intrinsic pulmonary embolism or identified. The findings were telephoned to the ED physician. Raegan Triana ALERT:  CRITICAL RESULT                        CT ABDOMEN PELVIS W IV CONTRAST Additional Contrast? None   Final Result      1. THICK WALLED ENHANCING BLADDER WHICH COULD REFLECT CYSTITIS,   CORRELATION WITH URINALYSIS IS RECOMMENDED. 2.  INTERVAL INCREASE IN SIZE OF BILATERAL ADRENAL METASTASES WITH   INTERVAL DEVELOPMENT OF CENTRAL NECROSIS. 3.  INTERVAL ENLARGEMENT OF RETROPERITONEAL AND MESENTERIC LYMPH NODES   AS DESCRIBED. 4.  INDETERMINATE LOW-ATTENUATION LESION IN THE LIVER, IS UNCHANGED   FROM 12/15/2018. 5. MILD PROMINENCE OF THE INTRAHEPATIC BILIARY TREE WITHOUT FOCAL   OBSTRUCTION OR DILATION OF THE EXTRAHEPATIC BILIARY TREE, LIKELY   PHYSIOLOGIC. 6.  NODULAR OPACITIES IN THE LEFT LOWER LOBE IN A TREE-IN-BUD   DISTRIBUTION SUGGESTING INFECTIOUS BRONCHIOLITIS, THESE APPEAR TO BE   NEW FROM 4/29/2019. FOLLOW-UP IS RECOMMENDED TO ENSURE RESOLUTION. 7.  BILATERAL SACRAL INSUFFICIENCY FRACTURES      XR CHEST PORTABLE   Final Result      No airspace opacities or pleural effusion.             US DUP LOWER EXTREMITIES BILATERAL VENOUS    (Results Pending)         Assessment/Plan:  Active Hospital Problems    Diagnosis Date Noted    Severe malnutrition ([address] [E43] 05/23/2019    Left leg swelling [M79.89] 05/23/2019    Stage 3 chronic kidney disease ([address] [N18.3] 05/23/2019    Acute respiratory failure (HCC) [J96.00] 05/23/2019    PNA (pneumonia) [J18.9] 05/23/2019    Mucus plugging of bronchi [J98.09] 05/23/2019    COPD exacerbation (HCC) [J44.1] 05/22/2019    Mass of left lung [R91.8] 05/22/2019    Mediastinal lymphadenopathy [R59.0] 05/22/2019    Recurrent UTI [N39.0] 05/22/2019    Elevated troponin [R74.8] 05/22/2019    Mass of both adrenal glands ([address] [E27.9] 05/22/2019    Goals of care, counseling/discussion [Z71.89]     Hematuria [R31.9] accuracy; however, inadvertent computerized transcription errors may be present.

## 2019-05-23 NOTE — H&P
central necrosis, increased mediastinal lymphadenopathy, stable liver lesion, left lower lobe nodular opacity with tree-in-bud appearance, question bronchitis, bilateral insufficiency sacral fractures. CT scan of the chest shows an 8 x 4.5 x 3.5 cm left hilar mass  abutting the aortic arch and surrounding the left main pulmonary artery and partially occluding the left upper lobe pulmonary artery. This mass also surrounds the left main bronchus and occludes one of the left pulmonary veins. There is also an infiltrate seen with nodular density in the left lower lobe measuring 7 mm and another one measuring 3 mm in the right upper lobe. Mucus plugging in the right lower lobe and hypodense lesion in the left hepatic lobe concerning for metastases. EKG shows normal sinus rhythm rate of 67 with no acute ST-T wave changes. She is being admitted for further management.     Past Medical History:          Diagnosis Date    [facility]ch[facility] obstructive pulmonary disease) ([facility] [address]     Hyperlipidemia     Hypertension     PMB (postmenopausal bleeding)     Squamous cell carcinoma 02/06/2017    Invasive non-keratinizing squamous cell carcinoma with extensive squamous cell/severe dysplasia LAY 3       Past Surgical History:          Procedure Laterality Date    APPENDECTOMY      COLONOSCOPY N/A 3/13/2019    COLONOSCOPY WITH BIOPSY performed by Michael Jara MD at [address] Right 3/4/2019    HIP OPEN REDUCTION INTERNAL FIXATION performed by Modesto Hauser MD at [place] [address] Right     r hip surgery    LAPAROSCOPY      MOUTH SURGERY      NEPHROSTOMY Right     OTHER SURGICAL HISTORY  06/27/2017    Tandem and Ovoid insertion     OTHER SURGICAL HISTORY N/A 08/04/2017    TONSILLECTOMY      TONSILLECTOMY      URETER STENT PLACEMENT      kidney stents due to kidney failure       Medications Prior to Admission: one of her siblings    History reviewed. No pertinent family history. REVIEW OF SYSTEMS:   Pertinent positives as noted in the HPI. All other systems reviewed and negative. PHYSICAL EXAM:    /64   Pulse 72   Temp 98.8 °F (37.1 °C) (Temporal)   Resp 20   Ht 5' 3\" (1.6 m)   Wt 92 lb (41.7 kg)   SpO2 98%   BMI 16.30 kg/m²     General appearance:   middle-age female, chronically ill appearing and weak, in moderate respiratory distress, appears stated age and cooperative. Diaphoretic. Afebrile. HEENT:  Normal cephalic, atraumatic without obvious deformity. Pupils equal, round, and reactive to light. Extra ocular muscles intact. Conjunctivae/corneas clear. Neck: Supple, with full range of motion. jugular venous distention. Trachea midline. Respiratory: Increased work of breathing. diffuse Rales/Wheezes/Rhonchi throughout the lungs   Cardiovascular:  Regular rate and rhythm with normal S1/S2 without murmurs, rubs or gallops. Abdomen:Full, firm, diffusely tender  with normal bowel sounds. Right nephrostomy catheter in place. Musculoskeletal:  No clubbing/cyanosis, 2+ edema right leg. limited  range of motion without deformity. Skin: Skin color, texture, turgor normal.  No rashes or lesions. Neurologic:  Neurovascularly intact without any focal sensory/motor deficits. Cranial nerves: II-XII intact, grossly non-focal.  Psychiatric:  Alert and oriented, thought content appropriate, normal insight  Capillary Refill: Brisk,< 3 seconds   Peripheral Pulses: +2 palpable, equal bilaterally       Labs:     Recent Labs     05/22/19  1620   WBC 6.6   HGB 12.0   HCT 37.6        Recent Labs     05/22/19  1620 05/22/19  1840     --    K 5.6* 5.0     --    CO2 22  --    BUN 18  --    CREATININE 1.3*  --    CALCIUM 9.0  --      Recent Labs     05/22/19  1620   AST 21   ALT 8   BILITOT <0.2   ALKPHOS 93     No results for input(s): INR in the last 72 hours.   Recent Labs     05/22/19  1620 TROPONINI 0.18*       Urinalysis:      Lab Results   Component Value Date    NITRU POSITIVE 05/22/2019    WBCUA >20 05/22/2019    WBCUA >100 08/04/2017    BACTERIA MODERATE 05/22/2019    RBCUA >20 05/22/2019    RBCUA >100 08/04/2017    BLOODU LARGE 05/22/2019    SPECGRAV 1.015 05/22/2019    GLUCOSEU Negative 05/22/2019       Radiology:   Reviewed and documented   CTA CHEST W CONTRAST   Final Result   A large lobulated mass in the left hilum abutting the hilar   structures, the aorta and invading the left main pulmonary artery and   partially occluding the left upper lobe pulmonary artery and pulmonary   vein on the left side. Findings are concerning for malignancy either   primary or metastasis. There is possible metastatic involvement in the   right and left lungs,  adrenal metastasis and possibly liver   metastasis. No other intrinsic pulmonary embolism or identified. The findings were telephoned to the ED physician. Lord Miguel ALERT:  CRITICAL RESULT                        CT ABDOMEN PELVIS W IV CONTRAST Additional Contrast? None   Final Result      1. THICK WALLED ENHANCING BLADDER WHICH COULD REFLECT CYSTITIS,   CORRELATION WITH URINALYSIS IS RECOMMENDED. 2.  INTERVAL INCREASE IN SIZE OF BILATERAL ADRENAL METASTASES WITH   INTERVAL DEVELOPMENT OF CENTRAL NECROSIS. 3.  INTERVAL ENLARGEMENT OF RETROPERITONEAL AND MESENTERIC LYMPH NODES   AS DESCRIBED. 4.  INDETERMINATE LOW-ATTENUATION LESION IN THE LIVER, IS UNCHANGED   FROM 12/15/2018. 5. MILD PROMINENCE OF THE INTRAHEPATIC BILIARY TREE WITHOUT FOCAL   OBSTRUCTION OR DILATION OF THE EXTRAHEPATIC BILIARY TREE, LIKELY   PHYSIOLOGIC. 6.  NODULAR OPACITIES IN THE LEFT LOWER LOBE IN A TREE-IN-BUD   DISTRIBUTION SUGGESTING INFECTIOUS BRONCHIOLITIS, THESE APPEAR TO BE   NEW FROM 4/29/2019. FOLLOW-UP IS RECOMMENDED TO ENSURE RESOLUTION.    7.  BILATERAL SACRAL INSUFFICIENCY FRACTURES      XR CHEST PORTABLE   Final Result      No airspace opacities or pleural effusion. ASSESSMENT:    Active Hospital Problems    Diagnosis Date Noted    Severe malnutrition ([facility] [E43] 05/23/2019    Left leg swelling [M79.89] 05/23/2019    Stage 3 chronic kidney disease ([facility] [address] [N18.3] 05/23/2019    Acute respiratory failure (HCC) [J96.00] 05/23/2019    PNA (pneumonia) [J18.9] 05/23/2019    Mucus plugging of bronchi [J98.09] 05/23/2019    COPD exacerbation (HCC) [J44.1] 05/22/2019    Mass of left lung [R91.8] 05/22/2019    Mediastinal lymphadenopathy [R59.0] 05/22/2019    Recurrent UTI [N39.0] 05/22/2019    Elevated troponin [R74.8] 05/22/2019    Mass of both adrenal glands ([facility] [E27.9] 05/22/2019    Goals of care, counseling/discussion [Z71.89]     Hematuria [R31.9] 11/10/2018    Malignant neoplasm of exocervix ([facility] [C53.1] 02/14/2017   . Hyperkalemia  . Tobacco dependence      PLAN:  #1. Acute respiratory failure with hypoxia and new oxygen requirement. Etiology is multifactorial in this patient. She has metastatic disease in her lungs surrounding the left main bronchus, COPD exacerbation, right lower lobe mucus plugging and possible bronchiolitis left lower lung/pneumonia. Oxygen to keep sats above 92%. BiPAP as needed. ABG. Pulmonary consult. # 2 Acute COPD exacerbation, breathing treatment and steroids. #3. Metastatic cervical cancer. Worsening metastatic disease despite chemotherapy and radiation. Consult to oncology. #4. Hyperkalemia, hemolyzed sample, recheck labs. #5. Mucus plugging right lower lobe. Pulmonary consult for possible bronchitis. #6. Left leg swelling, Doppler ultrasound to rule out DVT. #7. Recurrent UTI, IV Rocephin. #8. Left lower lobe bronchiolitis/pneumonia, IV vancomycin and Rocephin. #9. Elevated troponin in the setting of renal insufficiency and respiratory failure. Type I versus type II non-STEMI. This is likely type II however patient is having chest pain as well. #10. Chest pain due to multiple problems above. Likely pulmonary etiology. Continue pain medications. Cardiac enzymes. #11. Bilateral adrenal mass likely metastatic in etiology. 12. Hematuria likely secondary to ongoing intra-abdominal process, UTI and presence of nephrostomy tubes. Monitor hemoglobin. #13. Severe malnutrition, this is cancer related. dietary supplementation. #14. Stage III chronic kidney disease at baseline. #15. Tobacco dependence, smoking cessation. DVT Prophylaxis: SCDs if negative doppler studies  Diet: No diet orders on file NPO  Code Status: Prior DNR-CC    PT/OT Eval Status: as needed    Dispo - inpx/tele       Randy Severs, MD    Thank you Forest Segundo MD for the opportunity to be involved in this patient's care.

## 2019-05-23 NOTE — PROGRESS NOTES
Subjective: The patient is awake and alert. She is complaining of dyspnea on minimal exertion. Dry cough. She has poor appetite she has been feeling hungry this morning. She has been nothing by mouth since admission. She reports no significant chest pain. No dysphagia or odynophagia. Mild nausea this morning but no vomiting. Chronic abdominal pelvic and bilateral flank pain as usual.  Objective:    BP (!) 146/73   Pulse 61   Temp 96.4 °F (35.8 °C) (Temporal)   Resp 18   Ht 5' 3\" (1.6 m)   Wt 94 lb 6.4 oz (42.8 kg)   SpO2 96%   BMI 16.72 kg/m²     General: Alert and oriented. Looks to be uncomfortable but not in apparent severe distress. Severely cachectic   HEENT: No thrush or mucositis, dry mucosa. Poor dentition EOMI, PERRLA  Heart:  RRR, no murmurs  Chest decreased breath sounds bilaterally. No significant rhonchi or rales. Abd: Mild to diffuse tenderness on palpation.   Extrem:  No clubbing, cyanosis, L lower extremity edema compared to right  Lymphatics: No palpable adenopathy in cervical and supraclavicular regions  Skin: Intact, no petechia or purpura    CBC with Differential:    Lab Results   Component Value Date    WBC 5.5 05/23/2019    RBC 3.41 05/23/2019    HGB 10.3 05/23/2019    HCT 32.7 05/23/2019     05/23/2019    MCV 95.9 05/23/2019    MCH 30.2 05/23/2019    MCHC 31.5 05/23/2019    RDW 15.5 05/23/2019    LYMPHOPCT 8.7 05/23/2019    MONOPCT 2.7 05/23/2019    BASOPCT 0.2 05/23/2019    MONOSABS 0.15 05/23/2019    LYMPHSABS 0.48 05/23/2019    EOSABS 0.00 05/23/2019    BASOSABS 0.01 05/23/2019     CMP:    Lab Results   Component Value Date     05/23/2019    K 5.2 05/23/2019    K 5.1 05/23/2019     05/23/2019    CO2 23 05/23/2019    BUN 18 05/23/2019    CREATININE 1.4 05/23/2019    GFRAA 46 05/23/2019    LABGLOM 38 05/23/2019    GLUCOSE 144 05/23/2019    PROT 7.5 05/22/2019    LABALBU 3.4 05/22/2019    CALCIUM 8.3 05/23/2019    BILITOT <0.2 05/22/2019    ALKPHOS 93 05/22/2019    AST 21 05/22/2019    ALT 8 05/22/2019              Current Facility-Administered Medications:     methylPREDNISolone sodium (SOLU-MEDROL) injection 40 mg, 40 mg, Intravenous, 4 times per day, Kat Given, MD, 40 mg at 05/23/19 0701    cefTRIAXone (ROCEPHIN) 1 g in sterile water 10 mL IV syringe, 1 g, Intravenous, Q24H, Kat Given, MD    acetaminophen (TYLENOL) tablet 650 mg, 650 mg, Oral, Q6H PRN, Kat Given, MD, 650 mg at 05/23/19 0701    docusate sodium (COLACE) capsule 100 mg, 100 mg, Oral, BID, Kat Given, MD, 100 mg at 05/23/19 0857    gabapentin (NEURONTIN) tablet 600 mg, 600 mg, Oral, TID, Kat Given, MD, 600 mg at 05/23/19 0857    methadone (DOLOPHINE) tablet 10 mg, 10 mg, Oral, Q8H PRN, Kat Given, MD, 10 mg at 05/23/19 0906    metoclopramide (REGLAN) tablet 5 mg, 5 mg, Oral, 4x Daily, Kat Given, MD, 5 mg at 05/23/19 0857    oxybutynin (DITROPAN) tablet 5 mg, 5 mg, Oral, Daily, Kat Given, MD, 5 mg at 05/23/19 0857    sertraline (ZOLOFT) tablet 50 mg, 50 mg, Oral, Daily, Kat Given, MD, 50 mg at 05/23/19 0857    sodium chloride flush 0.9 % injection 10 mL, 10 mL, Intravenous, 2 times per day, Kat Given, MD, 10 mL at 05/23/19 0859    sodium chloride flush 0.9 % injection 10 mL, 10 mL, Intravenous, PRN, Kat Given, MD, 10 mL at 05/23/19 0702    magnesium hydroxide (MILK OF MAGNESIA) 400 MG/5ML suspension 30 mL, 30 mL, Oral, Daily PRN, Kat Given, MD    ondansetron (ZOFRAN) injection 4 mg, 4 mg, Intravenous, Q6H PRN, Kat Given, MD, 4 mg at 05/23/19 0058    ipratropium-albuterol (DUONEB) nebulizer solution 1 ampule, 1 ampule, Inhalation, 4x daily, Kat Given, MD, 1 ampule at 05/23/19 0852    ipratropium-albuterol (DUONEB) nebulizer solution 1 ampule, 1 ampule, Inhalation, Q4H PRN, Kat Given, MD    guaiFENesin-dextromethorphan (ROBITUSSIN DM) 100-10 MG/5ML syrup 5 mL, 5 mL, Oral, Q4H PRN, hilar   structures, the aorta and invading the left main pulmonary artery and   partially occluding the left upper lobe pulmonary artery and pulmonary   vein on the left side. Findings are concerning for malignancy either   primary or metastasis. There is possible metastatic involvement in the   right and left lungs,  adrenal metastasis and possibly liver   metastasis. No other intrinsic pulmonary embolism or identified. CT abdomen pelvis 5/22/19  1.  THICK WALLED ENHANCING BLADDER WHICH COULD REFLECT CYSTITIS,   CORRELATION WITH URINALYSIS IS RECOMMENDED. 2.  INTERVAL INCREASE IN SIZE OF BILATERAL ADRENAL METASTASES WITH   INTERVAL DEVELOPMENT OF CENTRAL NECROSIS. 3.  INTERVAL ENLARGEMENT OF RETROPERITONEAL AND MESENTERIC LYMPH NODES   AS DESCRIBED. 4.  INDETERMINATE LOW-ATTENUATION LESION IN THE LIVER, IS UNCHANGED   FROM 12/15/2018. 5.  MILD PROMINENCE OF THE INTRAHEPATIC BILIARY TREE WITHOUT FOCAL   OBSTRUCTION OR DILATION OF THE EXTRAHEPATIC BILIARY TREE, LIKELY   PHYSIOLOGIC. 6.  NODULAR OPACITIES IN THE LEFT LOWER LOBE IN A TREE-IN-BUD   DISTRIBUTION SUGGESTING INFECTIOUS BRONCHIOLITIS, THESE APPEAR TO BE   NEW FROM 4/29/2019. FOLLOW-UP IS RECOMMENDED TO ENSURE RESOLUTION. 7.  BILATERAL SACRAL INSUFFICIENCY FRACTURES     Plan:  I reviewed with the patient the results of recent imaging tests. There is evidence of disease disease progression at least in the abdomen. The CT of the chest showed new left hilar mass that is probably responsible of her respiratory symptoms. This chest mass could represent metastatic cervical cancer but primary lung cancer in a smoker is still a possibility. Patient looks very tired and cachectic. She had multiple lines of therapy for cervical cancer. Prognosis is poor. I don't think pursuing diagnostic bronchoscopy would be very helpful in her case. She may benefit ago from palliative XRT to the lung mass to improve her symptoms and quality of life.     I

## 2019-05-23 NOTE — PROGRESS NOTES
Nutrition Assessment    Type and Reason for Visit: Initial, Positive Nutrition Screen    Nutrition Recommendations: Continue current diet, Start ONS    Nutrition Assessment: pt is severely malnourished on admit AEB severe muscle mass/fat loss, 9% wt loss x 1 month 2/2 metastatic CA on chemo/radiation. At further risk d/t metabolic demands for CA. Will start Ensure BID and continue protein modular BID. Malnutrition Assessment:  · Malnutrition Status: Meets the criteria for severe malnutrition  · Context: Chronic illness  · Findings of the 6 clinical characteristics of malnutrition (Minimum of 2 out of 6 clinical characteristics is required to make the diagnosis of moderate or severe Protein Calorie Malnutrition based on AND/ASPEN Guidelines):  1. Energy Intake-Less than or equal to 50% of estimated energy requirement, Greater than or equal to 7 days    2. Weight Loss-7.5% loss or greater, in 1 month  3. Fat Loss-Severe subcutaneous fat loss, Orbital, Triceps, Fat overlying the ribs  4. Muscle Loss-Severe muscle mass loss, Temples (temporalis muscle), Clavicles (pectoralis and deltoids), Thigh (quadriceps), Calf (gastrocnemius)  5. Fluid Accumulation-No significant fluid accumulation,    6.   Strength-Not measured    Nutrition Risk Level: High    Nutrient Needs:  · Estimated Daily Total Kcal: 6457-2320( x 1.3 SF)  · Estimated Daily Protein (g): 65-85(1.5-2.0 gm/kg )  · Estimated Daily Total Fluid (ml/day): 4432-6397(1 ml/kcal)    Nutrition Diagnosis:   · Problem: Severe malnutrition, In context of chronic illness  · Etiology: related to Catabolic illness(metastatic CA)     Signs and symptoms:  as evidenced by Diet history of poor intake, Weight loss greater than or equal to 5% in 1 month, Severe loss of subcutaneous fat, Severe muscle loss    Objective Information:  · Nutrition-Focused Physical Findings: pt alert, missing teeth, flat abd, trace edema, hyperkalemia, nephrostomy   · Wound Type: None  · Current Nutrition Therapies:  · Oral Diet Orders: General   · Oral Diet intake: Unable to assess(diet advanced from NPO today)  · Oral Nutrition Supplement (ONS) Orders: None  · Anthropometric Measures:  · Ht: 5' 3\" (160 cm)   · Current Body Wt: 94 lb (42.6 kg)(5/23 standing scale)  · Usual Body Wt: 103 lb (46.7 kg)(4/2019 bed scale, per EMR)  · % Weight Change:  ,  9% wt loss x 1 month  · Ideal Body Wt: 115 lb (52.2 kg), % Ideal Body 81%  · BMI Classification: BMI <18.5 Underweight    Nutrition Interventions:   Continue current diet, Start ONS  Continued Inpatient Monitoring, Education Initiated, Coordination of Care(discussed ONS options/preferences)    Nutrition Evaluation:   · Evaluation: Goals set   · Goals: Pt to consume>50% most meals/ONS    · Monitoring: Meal Intake, Supplement Intake, Diet Tolerance, Skin Integrity, I&O, Weight, Pertinent Labs, Monitor Hemodynamic Status, Monitor Bowel Function      Electronically signed by Eufemia Chase, MS, RD, LD on 5/23/19 at [address] [place] [address] PM    Contact Number: [phone]

## 2019-05-23 NOTE — PROGRESS NOTES
Dr. Gurinder Stone with pulmonology and Dr Krystle Aquino with Palliative medicine paged via perfect serve for consult.  Juan Gurrola

## 2019-05-24 ENCOUNTER — HOSPITAL ENCOUNTER (OUTPATIENT)
Dept: RADIATION ONCOLOGY | Age: 59
Discharge: HOME | End: 2019-05-24
Attending: RADIOLOGY
Payer: OTHER GOVERNMENT

## 2019-05-24 LAB
ANION GAP SERPL CALCULATED.3IONS-SCNC: 16 MMOL/L (ref 7–16)
ANISOCYTOSIS: ABNORMAL
BASOPHILS ABSOLUTE: 0 E9/L (ref 0–0.2)
BASOPHILS RELATIVE PERCENT: 0.1 % (ref 0–2)
BUN BLDV-MCNC: 27 MG/DL (ref 8–23)
CALCIUM SERPL-MCNC: 8.5 MG/DL (ref 8.6–10.2)
CHLORIDE BLD-SCNC: 96 MMOL/L (ref 98–107)
CO2: 23 MMOL/L (ref 22–29)
CREAT SERPL-MCNC: 1.3 MG/DL (ref 0.5–1)
EOSINOPHILS ABSOLUTE: 0 E9/L (ref 0.05–0.5)
EOSINOPHILS RELATIVE PERCENT: 0 % (ref 0–6)
GFR AFRICAN AMERICAN: 50
GFR NON-AFRICAN AMERICAN: 42 ML/MIN/1.73
GLUCOSE BLD-MCNC: 113 MG/DL (ref 74–99)
HCT VFR BLD CALC: 30.2 % (ref 34–48)
HEMOGLOBIN: 9.8 G/DL (ref 11.5–15.5)
HYPOCHROMIA: ABNORMAL
LYMPHOCYTES ABSOLUTE: 0.26 E9/L (ref 1.5–4)
LYMPHOCYTES RELATIVE PERCENT: 2.6 % (ref 20–42)
MCH RBC QN AUTO: 30.7 PG (ref 26–35)
MCHC RBC AUTO-ENTMCNC: 32.5 % (ref 32–34.5)
MCV RBC AUTO: 94.7 FL (ref 80–99.9)
MONOCYTES ABSOLUTE: 0.26 E9/L (ref 0.1–0.95)
MONOCYTES RELATIVE PERCENT: 2.6 % (ref 2–12)
NEUTROPHILS ABSOLUTE: 8.08 E9/L (ref 1.8–7.3)
NEUTROPHILS RELATIVE PERCENT: 94.7 % (ref 43–80)
PDW BLD-RTO: 15.3 FL (ref 11.5–15)
PLATELET # BLD: 318 E9/L (ref 130–450)
PMV BLD AUTO: 9.3 FL (ref 7–12)
POIKILOCYTES: ABNORMAL
POLYCHROMASIA: ABNORMAL
POTASSIUM REFLEX MAGNESIUM: 4.7 MMOL/L (ref 3.5–5)
RBC # BLD: 3.19 E12/L (ref 3.5–5.5)
SCHISTOCYTES: ABNORMAL
SODIUM BLD-SCNC: 135 MMOL/L (ref 132–146)
WBC # BLD: 8.5 E9/L (ref 4.5–11.5)

## 2019-05-24 PROCEDURE — 6370000000 HC RX 637 (ALT 250 FOR IP): Performed by: FAMILY MEDICINE

## 2019-05-24 PROCEDURE — 6360000002 HC RX W HCPCS: Performed by: FAMILY MEDICINE

## 2019-05-24 PROCEDURE — 1200000000 HC SEMI PRIVATE

## 2019-05-24 PROCEDURE — 85025 COMPLETE CBC W/AUTO DIFF WBC: CPT

## 2019-05-24 PROCEDURE — 2580000003 HC RX 258: Performed by: FAMILY MEDICINE

## 2019-05-24 PROCEDURE — 80048 BASIC METABOLIC PNL TOTAL CA: CPT

## 2019-05-24 PROCEDURE — 97161 PT EVAL LOW COMPLEX 20 MIN: CPT

## 2019-05-24 PROCEDURE — 36415 COLL VENOUS BLD VENIPUNCTURE: CPT

## 2019-05-24 PROCEDURE — 94660 CPAP INITIATION&MGMT: CPT

## 2019-05-24 PROCEDURE — 77412 RADIATION TX DELIVERY LVL 3: CPT | Performed by: RADIOLOGY

## 2019-05-24 PROCEDURE — 99232 SBSQ HOSP IP/OBS MODERATE 35: CPT | Performed by: EMERGENCY MEDICINE

## 2019-05-24 PROCEDURE — 94640 AIRWAY INHALATION TREATMENT: CPT

## 2019-05-24 PROCEDURE — 97535 SELF CARE MNGMENT TRAINING: CPT

## 2019-05-24 PROCEDURE — 97165 OT EVAL LOW COMPLEX 30 MIN: CPT

## 2019-05-24 PROCEDURE — 97530 THERAPEUTIC ACTIVITIES: CPT

## 2019-05-24 PROCEDURE — 94760 N-INVAS EAR/PLS OXIMETRY 1: CPT

## 2019-05-24 RX ORDER — PREDNISONE 10 MG/1
TABLET ORAL
Qty: 30 TABLET | Refills: 0 | Status: SHIPPED | OUTPATIENT
Start: 2019-05-24

## 2019-05-24 RX ORDER — METHYLPREDNISOLONE SODIUM SUCCINATE 40 MG/ML
20 INJECTION, POWDER, LYOPHILIZED, FOR SOLUTION INTRAMUSCULAR; INTRAVENOUS EVERY 8 HOURS
Status: DISCONTINUED | OUTPATIENT
Start: 2019-05-24 | End: 2019-05-25

## 2019-05-24 RX ADMIN — IPRATROPIUM BROMIDE AND ALBUTEROL SULFATE 1 AMPULE: .5; 3 SOLUTION RESPIRATORY (INHALATION) at 21:56

## 2019-05-24 RX ADMIN — IPRATROPIUM BROMIDE AND ALBUTEROL SULFATE 1 AMPULE: .5; 3 SOLUTION RESPIRATORY (INHALATION) at 08:19

## 2019-05-24 RX ADMIN — METHYLPREDNISOLONE SODIUM SUCCINATE 40 MG: 40 INJECTION, POWDER, FOR SOLUTION INTRAMUSCULAR; INTRAVENOUS at 01:09

## 2019-05-24 RX ADMIN — ONDANSETRON HYDROCHLORIDE 4 MG: 2 SOLUTION INTRAMUSCULAR; INTRAVENOUS at 06:50

## 2019-05-24 RX ADMIN — DOCUSATE SODIUM 100 MG: 100 CAPSULE, LIQUID FILLED ORAL at 20:09

## 2019-05-24 RX ADMIN — GABAPENTIN 600 MG: 600 TABLET, FILM COATED ORAL at 08:27

## 2019-05-24 RX ADMIN — MORPHINE SULFATE 2 MG: 2 INJECTION, SOLUTION INTRAMUSCULAR; INTRAVENOUS at 11:49

## 2019-05-24 RX ADMIN — Medication 10 ML: at 20:09

## 2019-05-24 RX ADMIN — ONDANSETRON HYDROCHLORIDE 4 MG: 2 SOLUTION INTRAMUSCULAR; INTRAVENOUS at 13:48

## 2019-05-24 RX ADMIN — HYDROCODONE BITARTRATE AND ACETAMINOPHEN 1 TABLET: 5; 325 TABLET ORAL at 02:24

## 2019-05-24 RX ADMIN — METHADONE HYDROCHLORIDE 10 MG: 10 TABLET ORAL at 06:31

## 2019-05-24 RX ADMIN — SERTRALINE 50 MG: 50 TABLET, FILM COATED ORAL at 08:27

## 2019-05-24 RX ADMIN — METOCLOPRAMIDE 5 MG: 5 TABLET ORAL at 13:45

## 2019-05-24 RX ADMIN — METOCLOPRAMIDE 5 MG: 5 TABLET ORAL at 20:08

## 2019-05-24 RX ADMIN — CEFTRIAXONE SODIUM 1 G: 1 INJECTION, POWDER, FOR SOLUTION INTRAMUSCULAR; INTRAVENOUS at 20:02

## 2019-05-24 RX ADMIN — METOCLOPRAMIDE 5 MG: 5 TABLET ORAL at 08:27

## 2019-05-24 RX ADMIN — GABAPENTIN 600 MG: 600 TABLET, FILM COATED ORAL at 13:45

## 2019-05-24 RX ADMIN — ONDANSETRON HYDROCHLORIDE 4 MG: 2 SOLUTION INTRAMUSCULAR; INTRAVENOUS at 19:59

## 2019-05-24 RX ADMIN — Medication 10 ML: at 08:42

## 2019-05-24 RX ADMIN — METHYLPREDNISOLONE SODIUM SUCCINATE 40 MG: 40 INJECTION, POWDER, FOR SOLUTION INTRAMUSCULAR; INTRAVENOUS at 13:46

## 2019-05-24 RX ADMIN — HYDROCODONE BITARTRATE AND ACETAMINOPHEN 1 TABLET: 5; 325 TABLET ORAL at 15:07

## 2019-05-24 RX ADMIN — GUAIFENESIN AND DEXTROMETHORPHAN 5 ML: 100; 10 SYRUP ORAL at 00:42

## 2019-05-24 RX ADMIN — MORPHINE SULFATE 2 MG: 2 INJECTION, SOLUTION INTRAMUSCULAR; INTRAVENOUS at 18:48

## 2019-05-24 RX ADMIN — Medication 10 ML: at 06:50

## 2019-05-24 RX ADMIN — GUAIFENESIN AND DEXTROMETHORPHAN 5 ML: 100; 10 SYRUP ORAL at 06:31

## 2019-05-24 RX ADMIN — IPRATROPIUM BROMIDE AND ALBUTEROL SULFATE 1 AMPULE: .5; 3 SOLUTION RESPIRATORY (INHALATION) at 17:44

## 2019-05-24 RX ADMIN — HYDROCODONE BITARTRATE AND ACETAMINOPHEN 1 TABLET: 5; 325 TABLET ORAL at 22:03

## 2019-05-24 RX ADMIN — DOCUSATE SODIUM 100 MG: 100 CAPSULE, LIQUID FILLED ORAL at 08:27

## 2019-05-24 RX ADMIN — Medication 10 ML: at 04:00

## 2019-05-24 RX ADMIN — GABAPENTIN 600 MG: 600 TABLET, FILM COATED ORAL at 20:08

## 2019-05-24 RX ADMIN — Medication 10 ML: at 03:58

## 2019-05-24 RX ADMIN — ACETAMINOPHEN 650 MG: 325 TABLET, FILM COATED ORAL at 00:41

## 2019-05-24 RX ADMIN — HYDROCODONE BITARTRATE AND ACETAMINOPHEN 1 TABLET: 5; 325 TABLET ORAL at 08:27

## 2019-05-24 RX ADMIN — OXYBUTYNIN CHLORIDE 5 MG: 5 TABLET ORAL at 08:27

## 2019-05-24 RX ADMIN — METHYLPREDNISOLONE SODIUM SUCCINATE 40 MG: 40 INJECTION, POWDER, FOR SOLUTION INTRAMUSCULAR; INTRAVENOUS at 06:31

## 2019-05-24 RX ADMIN — VANCOMYCIN HYDROCHLORIDE 750 MG: 10 INJECTION, POWDER, LYOPHILIZED, FOR SOLUTION INTRAVENOUS at 01:09

## 2019-05-24 RX ADMIN — Medication 10 ML: at 13:48

## 2019-05-24 RX ADMIN — METOCLOPRAMIDE 5 MG: 5 TABLET ORAL at 17:09

## 2019-05-24 RX ADMIN — Medication 10 ML: at 20:00

## 2019-05-24 RX ADMIN — MORPHINE SULFATE 2 MG: 2 INJECTION, SOLUTION INTRAMUSCULAR; INTRAVENOUS at 03:57

## 2019-05-24 ASSESSMENT — PAIN DESCRIPTION - PAIN TYPE
TYPE: CHRONIC PAIN

## 2019-05-24 ASSESSMENT — PAIN DESCRIPTION - LOCATION
LOCATION: CHEST;GENERALIZED
LOCATION: CHEST;ABDOMEN
LOCATION: GENERALIZED;BACK;ABDOMEN
LOCATION: ABDOMEN;GENERALIZED
LOCATION: ANKLE;GENERALIZED
LOCATION: ABDOMEN;GENERALIZED
LOCATION: CHEST
LOCATION: CHEST;GENERALIZED
LOCATION: CHEST
LOCATION: CHEST

## 2019-05-24 ASSESSMENT — PAIN - FUNCTIONAL ASSESSMENT
PAIN_FUNCTIONAL_ASSESSMENT: PREVENTS OR INTERFERES SOME ACTIVE ACTIVITIES AND ADLS

## 2019-05-24 ASSESSMENT — PAIN SCALES - GENERAL
PAINLEVEL_OUTOF10: 7
PAINLEVEL_OUTOF10: 10
PAINLEVEL_OUTOF10: 7
PAINLEVEL_OUTOF10: 6
PAINLEVEL_OUTOF10: 10
PAINLEVEL_OUTOF10: 10
PAINLEVEL_OUTOF10: 7
PAINLEVEL_OUTOF10: 6
PAINLEVEL_OUTOF10: 7
PAINLEVEL_OUTOF10: 0
PAINLEVEL_OUTOF10: 10
PAINLEVEL_OUTOF10: 6
PAINLEVEL_OUTOF10: 10

## 2019-05-24 ASSESSMENT — PAIN DESCRIPTION - DESCRIPTORS
DESCRIPTORS: ACHING;CONSTANT;DISCOMFORT
DESCRIPTORS: CONSTANT;DISCOMFORT;DULL
DESCRIPTORS: CONSTANT;ACHING;NAGGING
DESCRIPTORS: ACHING;CONSTANT;DISCOMFORT
DESCRIPTORS: ACHING;CONSTANT;DISCOMFORT
DESCRIPTORS: ACHING;CONSTANT;DULL;DISCOMFORT
DESCRIPTORS: ACHING;CONSTANT;DISCOMFORT;DULL
DESCRIPTORS: ACHING;DISCOMFORT;CONSTANT
DESCRIPTORS: ACHING;CONSTANT;DISCOMFORT;DULL
DESCRIPTORS: ACHING;CONSTANT;DISCOMFORT;DULL

## 2019-05-24 ASSESSMENT — PAIN DESCRIPTION - PROGRESSION
CLINICAL_PROGRESSION: NOT CHANGED

## 2019-05-24 ASSESSMENT — PAIN DESCRIPTION - ONSET
ONSET: PROGRESSIVE
ONSET: ON-GOING
ONSET: PROGRESSIVE
ONSET: ON-GOING

## 2019-05-24 ASSESSMENT — PAIN DESCRIPTION - ORIENTATION
ORIENTATION: MID
ORIENTATION: MID;LOWER

## 2019-05-24 ASSESSMENT — PAIN DESCRIPTION - FREQUENCY
FREQUENCY: CONTINUOUS

## 2019-05-24 NOTE — PROGRESS NOTES
Physical Therapy    Facility/Department: [facility] MED SURG ONC  Initial Assessment    NAME: Kavon Saez  : 1960  MRN: [id number]    Date of Service: 2019  Evaluating Therapist: Tabitha Alicea PT, DPT    Room #: 6450A  DIAGNOSIS: COPD exacerbation  PRECAUTIONS: falls, chemo/radiation  Pertinent Medical History: COPD, HLD, HTN, metastatic CA-lung, adrenals, liver,h/o hip fx ORIF 3/4/2019, B ureter stents due to   Kidney failure     Social:    Pt lives in a 2 story home with her 2 grandchildren: ages 6 and 12.  Pt's home has 4 stairs and 1 hand rail(s) to enter. Patient reports that the hand rails are not secure and would eventually like to have a ramp. Prior to admission pt used a [address] [employer] around the house and required assistance from her niece for transportation to the store. Patient's grandchildren also help complete tasks for her around the house. Patient reported one fall in the last few months. Patient has a bathroom and shower on the first floor. Patient currently has her bed on the first floor and has been staying there. Initial Evaluation  Date: 19 Treatment  Date: NA  Short Term/ Long Term   Goals   Was pt agreeable to Eval/treatment? Yes     Did pt report pain? Yes. Pt reported a 10/10 pain in her stomach, back, and chest.     Bed Mobility  Rolling: Independent  Supine to sit: Independent  Sit to supine: Independent  Scooting: Independent   NA   Transfers Sit to stand: SBA  Stand to sit: SBA  Stand pivot: SBA  Independent   Ambulation    80 feet x 1  50 x 1  with WW with CGA  Patient needed 1 seated rest break due to episode of nausea and dizziness approx 5 minutes long  150 feet with [address] with supervision   Stair negotiation: ascended and descended 4 steps with both hands holding 1 rail with min A. Patient needed 1 seated rest break. 10 steps with 1 rail with CGA   [facility] Raw Score 20/24       Alertness/Orientation: x3  LE AROM: Grossly WNL  LE Strength: Grossly WNL.   Static Balance: Sitting: Independent. Standing: SBA with WW due to episodes of SOB and dizziness. Dynamic Balance: CGA with [employer]    Endurance: Fair -   Sensation: no deficits reported. Edema/Skin Integrity: None    Chair/Bed Alarm: None     ASSESSMENT/TREATMENT  Pt displays functional ability as noted in the objective portion of this evaluation. Comments: Pt was seated EOB upon PT arrival and agreeable to PT evaluation/treatment. Patient reported feelings of fatigue and weakness. Patient reported 10/10 pain that wrapped around her abdomen, back, and chest. Patient reported feelings of SOB that occur during walking. Patient ambulated 80 feet with WW and CGA to stairs. Patient ascended 4 stairs using BUEs on one railing and required seated rest break on the stairs due to episode of dizziness and fatigue; SaO2 level on stairs was 91% and HR was 95bpm.  Patient descended stairs after seated rest break and ambulated 50 more feet. Patient needed a second rest break due to feelings of nausea, dizziness, lightheadedness--SaO2 95% and HR 84bpm. Patient ambulated back to room after approx 5 minute rest. Pt required cues for safety with hand placement with transfers, postural correction, PLB, and ww approximation throughout mobility. Pending progress anticipate pt is safe to return home with 24/7 assist/supervision at discharge. At end of session pt had with call light within reach and all needs met. Patient education  Pt educated re: safety recommendations, PT treatment expectations and POC, PT d/c recommendations. Pt was educated on safety during transfers, ambulation in room, stair mobility, and pre cautions during symptomatic episodes. Patient response to education:   Pt verbalized understanding Pt demonstrated skill Pt requires further education in this area   Yes Yes Yes     Rehab potential is good for reaching above PT goals. Pts/ family goals   To return home.       PLAN  PT care will be provided in accordance with the objectives noted above. Whenever appropriate, clear delegation orders will be provided for nursing staff. Exercises and functional mobility practice will be used as well as appropriate assistive devices or modalities to obtain goals. Patient and family education will also be administered as needed. Frequency of treatments will be 2-5x/week x 1-3 days. Patient and or family understand(s) diagnosis, prognosis, and plan of care. Time in: 1028  Time out: Sissy Good 364.  WENDY Dahl DPT   License number:  [id number]

## 2019-05-24 NOTE — PROGRESS NOTES
Occupational Therapy  OCCUPATIONAL THERAPY INITIAL EVALUATION      Date:2019  Patient Name: Yemi Levine  MRN: [id number]  : 1960  Room: [facility]      Evaluating OT:  ALTHEA Moscoso, OTR/L  # [id number]      AM-PAC Daily Activity Raw Score:    Recommended Adaptive Equipment:  TBD - Consider Shower Chair, [facility], Adaptive equipment for Item Retrieval    Reason for Admission:  Pt was admitted w/ increased SOB, Abdominal Pain, Chest Pain Radiating into back    Diagnosis:  COPD Exacerbation, Lung Mass      Procedures this admission:  Cont'd Chemo/Radiation     Pertinent Medical History:  COPD, HTN, Squamous Cell CA, R Hip ORIF 3-13-19, Mediastinal Mass, Stage IV Cervical CA w/ mets to adrenal glands, possible liver, new L Hilar Mass     Precautions:  Falls  Right nephrostomy tube  General Diet    Home Living: Pt lives with her 2 grandchildren ages 8&16 in a 2-story house with 4-5 SHERYL and 1 HR/s. 1st floor set up w/ bed on the 1st floor. Full bathroom on the 1st floor   Bathroom setup:  Walk-in Shower, standard commode   Equipment owned:  [address], Rollator, Sock Aid    Available Family Assist:  [employer] live next-door and can assist PRN, Grandchildren assist w/ IADLs    Prior Level of Function:  IND with ADLs, Light IADLs, Transfers and Mobility using FWW for household mobility  Driving:  No - [employer] provide transportation  Occupation:  None stated currently    Pain Level:  10/10 Stomach, back, Chest;  Nsg Notified   Additional Complaints:  Severe Nausea - Nsg aware    Vitals/Lab Values:  WFL, Room Air    Cognition: A & O x 4   Able to Follow Multi-Step Commands INDly   Memory:  good    Sequencing:  good    Problem solving:  good    Judgement/safety:  good   Additional Comments:  Pt was pleasant, cooperative, however, appropriately focused on nausea/pain, receiving meds prior to exiting unit for radiation.          Functional Assessment:   Initial Eval Status  Date: 19 Treatment Status  Date: Short Term Goals  Treatment frequency:    Feeding IND         Grooming SUP/Set up    Able to complete simple grooming tasks while seated EOB, unable to ambulate to sink for task d/t limited endurance r/t fatigue/nausea/pain     UB Dressing SUP/Set up    Pt was able to don personal bathrobe while seated EOB     LB Dressing SUP/Set up    Able to complete own LB dressing tasks seated/standing EOB after set up     Bathing NT         Toileting NT         Bed Mobility  Rolling:  IND  Repositioning:  IND   Supine to Sit:  IND    Sit to Supine:  IND           Functional Transfers Sit to stand:  SUP  Stand to sit:  SUP      Sup for safety sit<>stand from EOB, W/C     Functional Mobility SUP w/ FWW    ~ 20'     Balance Sitting:  Good - IND at EOB   Standing:  Good (-) SUP w/ FWW     Static/dynamic     Activity Tolerance Overall:  Fair (+)    Tolerated Sitting:  EOB ~ 15 mins  Tolerated Standing:  ~ 5 mins     Visual/  Perceptual WFL  Glasses:  No      Hearing WFL  Hearing Aids  No       Hand dominance: Right    UE ROM: RUE:  WFL      LUE:  WFL    Strength: RUE: grossly 4+/5     LUE: grossly 4+/5     Strength:  WFL [place] [employer]    Fine Motor Coordination:  WFL [place] [employer]    Sensation:  Denies numbness or tingling [place] [employer]  Tone:  WFL [place] [employer]  Edema:  None Noted                            Treatment:       -- Education:  Provided Pt/Family ed re: Benefits/Purpose of OT services;  OT Plan of Care;  Transfer Safety, Walker safety; Benefits of use of DME/AD/Adaptive equip/techs to increase safety/IND with Functional Ax; Techs to increase Safety/Safety Awareness w/ Functional Ax; [employer] Techs/Pursed-Lip Breathing;        Pt and/or Family verbalized/demonstrated a good understanding of education provided. Will Review PRN. Provided Skilled SUP/Assist w/ Pt safety, Proper Positioning, ADLs, Transfers and Functional Mobility as noted above, as well as set up and clean up for session.   Skilled monitoring of Vitals and pts

## 2019-05-24 NOTE — PROGRESS NOTES
Discussed goals of comfort focused care. Discussed that family will have number to call hospice 24 hours per day for concerns, that they can call hospice instead of 911. The patient stated that she would like to talk to the hospice liaison as an informational only session at this time. She would like to talk to her family about next steps at this time. She also would like to continue DNR-CC. The patient stated that she is comfortable on her current pain regiment. Palliative medicine is not managing her pain at this time. Patient could benefit from outpatient Palliative medicine if she elect to. Time/Communication  Time In: 0930  Time Out: 1000    Greater than 51% of time spent, total 30 minutes in counseling and coordination of care at the bedside regarding goals ofcare, diagnosis and prognosis and see above. Discharge planning: to be determined    Referrals to: none today    Discussed patient and the plan of care with the other IDT members of the Palliative Care Team, and with patient and floor nurse. Current Medications:  Inpatient medications reviewed: yes. Home Medications reviewed: yes. Subjective:     Subjective/Events  The patient stated she is tired of further chemotherapy and radiation. She stated that she had radiation treatment yesterday and was extremely nauseated for the rest of the day. She is not sure she can \"do these treatments anymore\". She is up and ambulating in the room. She is able to walk to the bathroom on her own. She is denying any headache or blurred vision, ear pain or sore throat, neck pain, chest pain or palpitations, worsening cough, worsening or change in her abdominal pain. She does admit to left leg swelling that she has had for the last several days. She stated that she had an ultrasound done of the left leg a few days ago which was negative (5/23/2019).   She is not sure if she is ready to stop her cancer treatments and would like to talk to her Oncologist about Tobacco History     Smoking Status  Current Every Day Smoker Smoking Frequency  0.25 packs/day for 40 years (10 pk yrs) Smoking Tobacco Type  Cigarettes    Smokeless Tobacco Use  Never Used          Alcohol History     Alcohol Use Status  No Comment  1 x a month          Drug Use     Drug Use Status  Yes Types  Marijuana Comment  couple times a month          Sexual Activity     Sexually Active  Not Asked                Family Meeting:  Participants:No family meeting held today  Family meeting was held to discuss:N/A      Objective:     Physical Exam  /76   Pulse 74   Temp 98.8 °F (37.1 °C)   Resp 18   Ht 5' 3\" (1.6 m)   Wt 96 lb 4.8 oz (43.7 kg)   SpO2 96%   BMI 17.06 kg/m²     Physical Exam   Constitutional: She is oriented to person, place, and time. No distress. Chronically ill appearing/cachectic female. HENT:   Head: Normocephalic and atraumatic. Right Ear: External ear normal.   Left Ear: External ear normal.   Nose: Nose normal.   Mouth/Throat: Oropharynx is clear and moist. No oropharyngeal exudate. Eyes: Pupils are equal, round, and reactive to light. Conjunctivae and EOM are normal. Right eye exhibits no discharge. Left eye exhibits no discharge. No scleral icterus. Neck: Normal range of motion. Neck supple. No tracheal deviation present. No thyromegaly present. Cardiovascular: Normal rate, regular rhythm and intact distal pulses. Murmur (Gr II/VI ERYN noted) heard. Pulmonary/Chest: No stridor. No respiratory distress. She has wheezes (Course breath sounds throughout. Diffuse inspiratory and expiratory wheezes throughout.). She has rales (Bibasilar inspiratory). She exhibits no tenderness. Abdominal: Soft. Bowel sounds are normal. She exhibits no distension. There is tenderness (Mild diffuse tenderness throughout.). There is no rebound and no guarding. Scaphoid shaped. Musculoskeletal: Normal range of motion. She exhibits no edema, tenderness or deformity.    Diffuse muscle weakness and wasting noted throughout. Lymphadenopathy:     She has no cervical adenopathy. Neurological: She is alert and oriented to person, place, and time. No cranial nerve deficit. Skin: Skin is warm and dry. Capillary refill takes less than 2 seconds. No rash noted. She is not diaphoretic. No erythema. There is pallor. Psychiatric:   Mood seems somewhat down and affect is flat. Vitals reviewed. Wisdom Symptom AssessmentScore  Wisdom Score    Pain Score 3   TirednessScore 4   Nausea Score 2   Depression Score 2   AnxietyScore 2   Drowsiness Score 0   Anorexia Score (0= eating well, 10= not eating) 4   Wellbeing Score  (10= worst sense of well-being) 8 - due to her metastatic disease. Constipation    5   Dyspnea Score    (0= noshortness of breath) 3     Assessed by: patient and provider . FLACC Scale (For Pain Assessment of the Non-Verbal Patient)  N/A Patient is able to verbalize. Results/Verification of Data Review  Objective data reviewed: labs, images, records, medication use, vitals, chart. Data in Support of Terminal Illness:  N/A Palliative Patient. Electronically signed by Destiny Levin DO on 5/24/2019 at 11:03 AM      Thank you for allowing Palliative Medicine to participate in the care of Kat Roca. Note: This report was completed using [employer] voiced recognition software. Every effort has been made to ensure accuracy; however, inadvertent computerized transcription errors may be present.

## 2019-05-24 NOTE — PROGRESS NOTES
Pharmacy Consultation Note  (Antibiotic Dosing and Monitoring)    Initial consult date: 2019  Consulting physician: Dr. Laquita Barrios  Drug(s): vancomycin  Indication: PNA  Age/Gender IBW DW  Allergy Information   58 y.o./F; 160 cm  42.8 kg  Latex; Asa [aspirin]; Other; and Contrast [barium-containing compounds]          Date  WBC BUN/CR Drug/Dose Time   Given Level(s)   (Time) Comments        5.5   18/1.4 Vancomycin 750 mg x1    Vancomycin 750 mg q12h 0117    1543      8.5 27/1.3 vanco 750 mg q12h  stopped @ 0838 0109         Intake/Output Summary (Last 24 hours) at 2019 1209  Last data filed at 2019 0924  Gross per 24 hour   Intake 670 ml   Output 525 ml   Net 145 ml       Temp max: Temp (24hrs), Av.6 °F (37 °C), Min:98.3 °F (36.8 °C), Max:98.8 °F (37.1 °C)      Cultures:  available culture and sensitivity results were reviewed in [facility]    Urine: 10-100K mixed marnie    SPUEX: abundant PMNs; rare yeast, rare GP diplococci,                                         rare GPC in chains ( @ 2760)    Blood: NGTD ( @ 5372 (set #2),  @ 3827 (set #1)). Assessment:  · Consulted by Dr. Laquita Barrios to dose/monitor vancomycin. · 57 yo/F admitted from home for SOB. · PMH: metastatic cervical CA (mets to lung (or possible primary lung, too (smoker)), liver, adrenal) with suspected PNA. · Empiric ATBs (vancomycin and ceftriaxone) started on admission. · : Vancomycin stopped today; ceftriaxone continues. Plan:  · Clinical Pharmacy sign off.     Giselle Manzanares, PharmD  2019  12:09 PM  Pager: [phone]

## 2019-05-24 NOTE — PROGRESS NOTES
Hospitalist Progress Note      PCP: Lilly Monaco MD    Date of Admission: 5/22/2019  Days in the hospital: 2    Chief Complaint: SOB    Hospital Course:     Admitted for Acute respiratory failure with hypoxia and new oxygen requirement. She has metastatic disease in her lungs surrounding the left main bronchus. History of Metastatic cervical cancer. Worsening metastatic disease despite chemotherapy and radiation. Pulmonary, palliative, oncology were consulted. Pulmonary - to perform biopsy of lung mass        Subjective  Patient seen and examined at bedside. NAD, on RA, no overnight events. No hematuria. Has no complaints this morning. Patient denies any fevers, chills, abd pain,  nausea, vomiting.       Medications:  Reviewed    Infusion Medications   Scheduled Medications    methylPREDNISolone  40 mg Intravenous 4 times per day    cefTRIAXone (ROCEPHIN) IV  1 g Intravenous Q24H    docusate sodium  100 mg Oral BID    gabapentin  600 mg Oral TID    metoclopramide  5 mg Oral 4x Daily    oxybutynin  5 mg Oral Daily    sertraline  50 mg Oral Daily    sodium chloride flush  10 mL Intravenous 2 times per day    ipratropium-albuterol  1 ampule Inhalation 4x daily    vancomycin  750 mg Intravenous Q12H    fentaNYL  1 patch Transdermal Q72H     PRN Meds: acetaminophen, methadone, sodium chloride flush, magnesium hydroxide, ondansetron, ipratropium-albuterol, guaiFENesin-dextromethorphan, analgesic ointment, albuterol, morphine, HYDROcodone 5 mg - acetaminophen      Intake/Output Summary (Last 24 hours) at 5/24/2019 0835  Last data filed at 5/24/2019 0602  Gross per 24 hour   Intake 550 ml   Output 525 ml   Net 25 ml       Exam:    BP (!) 150/80   Pulse 76   Temp 98.4 °F (36.9 °C) (Temporal)   Resp 18   Ht 5' 3\" (1.6 m)   Wt 96 lb 4.8 oz (43.7 kg)   SpO2 95%   BMI 17.06 kg/m²     General appearance:   middle-age female, chronically ill appearing and weak, in moderate respiratory distress, appears stated age and cooperative. Diaphoretic. Afebrile. HEENT:  Normal cephalic, atraumatic without obvious deformity. Pupils equal, round, and reactive to light. Extra ocular muscles intact. Conjunctivae/corneas clear. Neck: Supple, with full range of motion. jugular venous distention. Trachea midline. Respiratory: Increased work of breathing. diffuse Rales/Wheezes/Rhonchi throughout the lungs   Cardiovascular:  Regular rate and rhythm with normal S1/S2 without murmurs, rubs or gallops. Abdomen:Full, firm, diffusely tender  with normal bowel sounds. Right nephrostomy catheter in place. Musculoskeletal:  No clubbing/cyanosis, 2+ edema right leg. limited  range of motion without deformity. Skin: Skin color, texture, turgor normal.  No rashes or lesions. Neurologic:  Neurovascularly intact without any focal sensory/motor deficits.  Cranial nerves: II-XII intact, grossly non-focal.  Psychiatric:  Alert and oriented, thought content appropriate, normal insight  Capillary Refill: Brisk,< 3 seconds   Peripheral Pulses: +2 palpable, equal bilaterally           Labs:   Recent Labs     05/22/19  1620 05/23/19  0624 05/24/19  0700   WBC 6.6 5.5 8.5   HGB 12.0 10.3* 9.8*   HCT 37.6 32.7* 30.2*    315 318     Recent Labs     05/22/19  1620 05/22/19  1840 05/23/19  0624     --  135   K 5.6* 5.0 5.2*  5.1*     --  100   CO2 22  --  23   BUN 18  --  18   CREATININE 1.3*  --  1.4*   CALCIUM 9.0  --  8.3*     Recent Labs     05/22/19  1620   AST 21   ALT 8   BILITOT <0.2   ALKPHOS 93     Recent Labs     05/23/19  0624   INR 1.1     Recent Labs     05/22/19  1620   TROPONINI 0.18*       Imaging:  US DUP LOWER EXTREMITIES BILATERAL VENOUS   Final Result   No evidence for deep venous thrombosis               CTA CHEST W CONTRAST   Final Result   A large lobulated mass in the left hilum abutting the hilar   structures, the aorta and invading the left main pulmonary artery and   partially occluding the left upper lobe pulmonary artery and pulmonary   vein on the left side. Findings are concerning for malignancy either   primary or metastasis. There is possible metastatic involvement in the   right and left lungs,  adrenal metastasis and possibly liver   metastasis. No other intrinsic pulmonary embolism or identified. The findings were telephoned to the ED physician. Debbie Antonio ALERT:  CRITICAL RESULT                        CT ABDOMEN PELVIS W IV CONTRAST Additional Contrast? None   Final Result      1. THICK WALLED ENHANCING BLADDER WHICH COULD REFLECT CYSTITIS,   CORRELATION WITH URINALYSIS IS RECOMMENDED. 2.  INTERVAL INCREASE IN SIZE OF BILATERAL ADRENAL METASTASES WITH   INTERVAL DEVELOPMENT OF CENTRAL NECROSIS. 3.  INTERVAL ENLARGEMENT OF RETROPERITONEAL AND MESENTERIC LYMPH NODES   AS DESCRIBED. 4.  INDETERMINATE LOW-ATTENUATION LESION IN THE LIVER, IS UNCHANGED   FROM 12/15/2018. 5. MILD PROMINENCE OF THE INTRAHEPATIC BILIARY TREE WITHOUT FOCAL   OBSTRUCTION OR DILATION OF THE EXTRAHEPATIC BILIARY TREE, LIKELY   PHYSIOLOGIC. 6.  NODULAR OPACITIES IN THE LEFT LOWER LOBE IN A TREE-IN-BUD   DISTRIBUTION SUGGESTING INFECTIOUS BRONCHIOLITIS, THESE APPEAR TO BE   NEW FROM 4/29/2019. FOLLOW-UP IS RECOMMENDED TO ENSURE RESOLUTION. 7.  BILATERAL SACRAL INSUFFICIENCY FRACTURES      XR CHEST PORTABLE   Final Result      No airspace opacities or pleural effusion.                   Assessment/Plan:  Active Hospital Problems    Diagnosis Date Noted    Severe malnutrition ([address] [E43] 05/23/2019    Left leg swelling [M79.89] 05/23/2019    Stage 3 chronic kidney disease ([address] [N18.3] 05/23/2019    Acute respiratory failure (HCC) [J96.00] 05/23/2019    PNA (pneumonia) [J18.9] 05/23/2019    Mucus plugging of bronchi [J98.09] 05/23/2019    Severe protein-calorie malnutrition ([address] [E43] 05/23/2019    COPD exacerbation ([address] [J44.1] 05/22/2019    Lung mass [R91.8] 05/22/2019    Mediastinal lymphadenopathy [R59.0] 05/22/2019    Recurrent UTI [N39.0] 05/22/2019    Elevated troponin [R74.8] 05/22/2019    Mass of both adrenal glands ([facility] [E27.9] 05/22/2019    Goals of care, counseling/discussion [Z71.89]     Pain, neoplasm-related [G89.3]     Hematuria [R31.9] 11/10/2018    Malignant neoplasm of exocervix ([facility] [C53.1] 02/14/2017     1. Acute respiratory failure with hypoxia and new oxygen requirement. Etiology is multifactorial in this patient. She has metastatic disease in her lungs surrounding the left main bronchus, COPD exacerbation, right lower lobe mucus plugging and possible bronchiolitis left lower lung/pneumonia. Oxygen to keep sats above 92%. BiPAP as needed. Pulmonary input noted and to perform biopsy of lung mass  No antibiotics. 2 Acute COPD exacerbation, breathing treatment and steroids. 3. Metastatic cervical cancer. Worsening metastatic disease despite chemotherapy and radiation. Oncology input noted  Rad/onc input noted    4. Hyperkalemia - treat and trend    5. Mucus plugging right lower lobe. Pulmonary input noted. 6. Left leg swelling, Doppler ultrasound - negative for DVT. 7. Recurrent UTI, IV Rocephin. 8. Left lower lobe bronchiolitis/pneumonia, IV vancomycin and Rocephin. 9. Elevated troponin in the setting of renal insufficiency and respiratory failure. Type I versus type II non-STEMI. chest pain has resolved. 10. Chest pain due to multiple problems above. Likely pulmonary etiology. Continue pain medications. - resolved     11. Bilateral adrenal mass likely metastatic in etiology. 12. Hematuria likely secondary to ongoing intra-abdominal process, UTI and presence of nephrostomy tubes. Monitor hemoglobin. 13. Severe malnutrition, this is cancer related. dietary supplementation. 14. Stage III chronic kidney disease at baseline.     15. Tobacco dependence, smoking cessation.     DVT Prophylaxis: SCDs if negative doppler studies  Diet: No diet orders on file NPO  Code Status: Prior DNR-CC     PT/OT Eval Status: as needed     Dispo - inpx/tele           Rose Marie Hernandez MD  [employer]  Please contact me through perfect serve    NOTE: This report was transcribed using voice recognition software. Every effort was made to ensure accuracy; however, inadvertent computerized transcription errors may be present.

## 2019-05-24 NOTE — PROGRESS NOTES
Subjective: The patient is awake and alert. She will be moved to a regular nursing floor today. No problems overnight. Denies chest pain, angina, abdominal discomfort. Complains of decreased appetite, intermittent nausea but no emesis. Has dyspnea with minimal exertion. Objective:    /76   Pulse 74   Temp 98.8 °F (37.1 °C)   Resp 18   Ht 5' 3\" (1.6 m)   Wt 96 lb 4.8 oz (43.7 kg)   SpO2 96%   BMI 17.06 kg/m²     General: NAD  HEENT: No thrush or mucositis, EOMI  Heart:  RRR, no murmurs, gallops, or rubs. Lungs:  Decreased BS bilaterally, no wheeze, rales or rhonchi  Abd: Mild generalized ttp. BS present, nondistended, no masses  Extrem:  No clubbing, cyanosis.  BLE L>R edema  Lymphatics: No palpable adenopathy in cervical and supraclavicular regions  Skin: Intact, no petechia or purpura    CBC with Differential:    Lab Results   Component Value Date    WBC 8.5 05/24/2019    RBC 3.19 05/24/2019    HGB 9.8 05/24/2019    HCT 30.2 05/24/2019     05/24/2019    MCV 94.7 05/24/2019    MCH 30.7 05/24/2019    MCHC 32.5 05/24/2019    RDW 15.3 05/24/2019    LYMPHOPCT 8.7 05/23/2019    MONOPCT 2.7 05/23/2019    BASOPCT 0.2 05/23/2019    MONOSABS 0.15 05/23/2019    LYMPHSABS 0.48 05/23/2019    EOSABS 0.00 05/23/2019    BASOSABS 0.01 05/23/2019     CMP:    Lab Results   Component Value Date     05/24/2019    K 4.7 05/24/2019    CL 96 05/24/2019    CO2 23 05/24/2019    BUN 27 05/24/2019    CREATININE 1.3 05/24/2019    GFRAA 50 05/24/2019    LABGLOM 42 05/24/2019    GLUCOSE 113 05/24/2019    PROT 7.5 05/22/2019    LABALBU 3.4 05/22/2019    CALCIUM 8.5 05/24/2019    BILITOT <0.2 05/22/2019    ALKPHOS 93 05/22/2019    AST 21 05/22/2019    ALT 8 05/22/2019      [facility]:[id number]}     Current Facility-Administered Medications:     methylPREDNISolone sodium (SOLU-MEDROL) injection 40 mg, 40 mg, Intravenous, 4 times per day, Claudetta Basque, MD, 40 mg at 05/24/19 0631    cefTRIAXone (ROCEPHIN) 1 g in sterile water 10 mL IV syringe, 1 g, Intravenous, Q24H, Campos Merritt MD, 1 g at 05/23/19 2015    acetaminophen (TYLENOL) tablet 650 mg, 650 mg, Oral, Q6H PRN, Campos Merritt MD, 650 mg at 05/24/19 0041    docusate sodium (COLACE) capsule 100 mg, 100 mg, Oral, BID, Campos Merritt MD, 100 mg at 05/24/19 0827    gabapentin (NEURONTIN) tablet 600 mg, 600 mg, Oral, TID, Campos Merritt MD, 600 mg at 05/24/19 0827    methadone (DOLOPHINE) tablet 10 mg, 10 mg, Oral, Q8H PRN, Campos Merritt MD, 10 mg at 05/24/19 0631    metoclopramide (REGLAN) tablet 5 mg, 5 mg, Oral, 4x Daily, Campos Merritt MD, 5 mg at 05/24/19 0827    oxybutynin (DITROPAN) tablet 5 mg, 5 mg, Oral, Daily, Campos Merritt MD, 5 mg at 05/24/19 0827    sertraline (ZOLOFT) tablet 50 mg, 50 mg, Oral, Daily, Campos Merritt MD, 50 mg at 05/24/19 0827    sodium chloride flush 0.9 % injection 10 mL, 10 mL, Intravenous, 2 times per day, Campos Merritt MD, 10 mL at 05/24/19 0842    sodium chloride flush 0.9 % injection 10 mL, 10 mL, Intravenous, PRN, Campos Merritt MD, 10 mL at 05/24/19 0650    magnesium hydroxide (MILK OF MAGNESIA) 400 MG/5ML suspension 30 mL, 30 mL, Oral, Daily PRN, Campos Merritt MD    ondansetron (ZOFRAN) injection 4 mg, 4 mg, Intravenous, Q6H PRN, Campos Merritt MD, 4 mg at 05/24/19 0650    ipratropium-albuterol (DUONEB) nebulizer solution 1 ampule, 1 ampule, Inhalation, 4x daily, Campos Merritt MD, 1 ampule at 05/24/19 0819    ipratropium-albuterol (DUONEB) nebulizer solution 1 ampule, 1 ampule, Inhalation, Q4H PRN, Campos Merritt MD    guaiFENesin-dextromethorphan (ROBITUSSIN DM) 100-10 MG/5ML syrup 5 mL, 5 mL, Oral, Q4H PRN, Campos Merritt MD, 5 mL at 05/24/19 0631    fentaNYL (DURAGESIC) 100 MCG/HR 1 patch, 1 patch, Transdermal, Q72H, Campos Merritt MD, 1 patch at 05/23/19 1220    analgesic ointment ointment, , Topical, PRN, Campos Merritt MD    albuterol (PROVENTIL) nebulizer solution 2.5 mg, 2.5 mg, Nebulization, Q4H PRN, Campos Merritt MD    morphine (PF) injection 2 mg, 2 mg, Intravenous, Q4H PRN, Campos Merritt MD, 2 mg at 05/24/19 0357    HYDROcodone-acetaminophen (NORCO) 5-325 MG per tablet 1 tablet, 1 tablet, Oral, Q6H PRN, Campos Merritt MD, 1 tablet at 05/24/19 0894    Assessment:    Active Problems:    Malignant neoplasm of exocervix (HCC)    Hematuria    Pain, neoplasm-related    Goals of care, counseling/discussion    COPD exacerbation (HCC)    Lung mass    Mediastinal lymphadenopathy    Recurrent UTI    Elevated troponin    Mass of both adrenal glands (HCC)    Severe malnutrition (HCC)    Left leg swelling    Stage 3 chronic kidney disease (HCC)    Acute respiratory failure (HCC)    PNA (pneumonia)    Mucus plugging of bronchi    Severe protein-calorie malnutrition ([address]  Resolved Problems:    * No resolved hospital problems. *    78-year-old female known to Dr. Abhilash Calix with stage IIIB cervical cancer with PDL1 expression 50%. Treated with concurrent chemoRT with weekly Cisplatin followed by brachytherapy completed Aug 2017. Recurrent/stage IV disease into the L clavicle, abdominal and pelvic LAD and treated with Carbo/Taxol/Avastin. Progression on PET from Oct 2018. Intolerant to Navelbine. Currently on immunotherapy with Keytruda since 12/13/18 for 6 cycles, last dose on 5/15/19.    -Admitted with worsening dyspnea and cough. CTA chest 5/22/19 revealed :   A large lobulated mass in the left hilum abutting the hilar   structures, the aorta and invading the left main pulmonary artery and   partially occluding the left upper lobe pulmonary artery and pulmonary   vein on the left side. Findings are concerning for malignancy either   primary or metastasis. There is possible metastatic involvement in the   right and left lungs,  adrenal metastasis and possibly liver   metastasis. No other intrinsic pulmonary embolism or identified.    CT abdomen pelvis 5/22/19  1.  THICK WALLED ENHANCING BLADDER WHICH COULD REFLECT CYSTITIS,   CORRELATION WITH URINALYSIS IS RECOMMENDED. 2.  INTERVAL INCREASE IN SIZE OF BILATERAL ADRENAL METASTASES WITH   INTERVAL DEVELOPMENT OF CENTRAL NECROSIS. 3.  INTERVAL ENLARGEMENT OF RETROPERITONEAL AND MESENTERIC LYMPH NODES   AS DESCRIBED. 4.  INDETERMINATE LOW-ATTENUATION LESION IN THE LIVER, IS UNCHANGED   FROM 12/15/2018. 5.  MILD PROMINENCE OF THE INTRAHEPATIC BILIARY TREE WITHOUT FOCAL   OBSTRUCTION OR DILATION OF THE EXTRAHEPATIC BILIARY TREE, LIKELY   PHYSIOLOGIC. 6.  NODULAR OPACITIES IN THE LEFT LOWER LOBE IN A TREE-IN-BUD   DISTRIBUTION SUGGESTING INFECTIOUS BRONCHIOLITIS, THESE APPEAR TO BE   NEW FROM 4/29/2019. FOLLOW-UP IS RECOMMENDED TO ENSURE RESOLUTION. 7.  BILATERAL SACRAL INSUFFICIENCY FRACTURES       Plan:  She has evidence of disease progression with new L hilar mass, primary lung or metastatic cervical.    Continue with Palliative XRT #2 fraction today to improve symptoms. Continue current pain regimen. Follow up with Dr. Nisha Jasso after discharge.        Electronically signed by Dustin Carlin MD on 5/24/2019 at 10:32 AM

## 2019-05-24 NOTE — PROGRESS NOTES
Coordination of care discussion and chart review with PM team.Pt known to LSW from prior admissions. WE discussed her plan of are, she wants to speak with her sons about hospice care at home. She wants to be able to walk and visit friends if able. We discussed hospice agencies, choices provided. She chooses [facility] for information visit. We also discussed her home support. Her 2 grandchildren continue to reside with her , her sons are supportive and one will pursue guardianship of the children in the future. Pt sister lives next door. Supportive listening provided.  will remain available for on-going psychosocial support, as needed. Referral made to [address] per pt choice for information only.

## 2019-05-24 NOTE — PROGRESS NOTES
[facility]  Department of Pulmonary, Critical Care and Sleep Medicine  Progess Note    Patient: Hu No  MRN: [id number]  : 1960    Encounter Time: 3:54 PM     Date of Admission: 2019  3:30 PM    Primary Care Physician: Deborah Martinez MD    Reason for Consultation: COPD and lung mass     SUBJECTIVE:    This hospital is unable to accomodates a routine bronchoscopy - procedure was canceled. She states \" im overwhelmed\"       OBJECTIVE:     PHYSICAL EXAM:   VITALS:     Intake/Output Summary (Last 24 hours) at 2019 1554  Last data filed at 2019 1402  Gross per 24 hour   Intake 910 ml   Output 525 ml   Net 385 ml        CONSTITUTIONAL:    A&O x 3,   SKIN:     No rash,  HEENT:     EOMI, MMM, No thrush  NECK:    No bruits, No JVP apprechiated  CV:      Sinus,  No murmus, No Rubs, No gallops  PULMONARY:   Aysmeteric Couse BS,  No Wheezing, No Rales, No Rhonchi  ABDOMEN:     Soft, non-tender. BS normal. No R/R/G  EXT:    No deformities . No clubbing. No lower extremity edema, No venous stasis  PULSE:   Appears equal and palpable.   PSYCHIATRIC:  Seems appropriate, No acute psycosis, scared  MS:    No fractures, No gross weakness  NEUROLOGIC:   The exam is non-focal     DATA: IMAGING & TESTING:     LABORATORY TESTS:    CBC:   Lab Results   Component Value Date    WBC 8.5 2019    RBC 3.19 2019    HGB 9.8 2019    HCT 30.2 2019    MCV 94.7 2019    MCH 30.7 2019    MCHC 32.5 2019    RDW 15.3 2019     2019    MPV 9.3 2019     CMP:    Lab Results   Component Value Date     2019    K 4.7 2019    CL 96 2019    CO2 23 2019    BUN 27 2019    CREATININE 1.3 2019    GFRAA 50 2019    LABGLOM 42 2019    GLUCOSE 113 2019    PROT 7.5 2019    LABALBU 3.4 2019    CALCIUM 8.5 2019    BILITOT <0.2 2019    ALKPHOS 93 2019 AST 21 05/22/2019    ALT 8 05/22/2019        PRO-BNP:   Lab Results   Component Value Date    PROBNP 3,660 (H) 05/23/2019      ABGs:   Lab Results   Component Value Date    PH 7.394 05/23/2019    PO2 49.6 05/23/2019    PCO2 37.2 05/23/2019     Hemoglobin A1C: No components found for: HGBA1C    IMAGING:  Imaging tests were completed and reviewed and discussed radiology and care team involved and reveals   Ct Abdomen Pelvis Wo Contrast    Addendum Date: 5/9/2019    Reviewed the previous CT abdomen and pelvis dated of April 29, 2019. Also reviewed the previous PET nuclear medicine scan study dated May 30 and October 8, 2018 and previous CAT scan examinations of the abdomen and pelvis of January 11, 2017, November 10, 2018, December 15, 2018. Patient has history of cervical malignancy. Ureter stents have been applied to decompress the distal ureters. I could not identified a conspicuously mesenteric mass or bulky adenopathy. As reported on the study of April 2019, 2018 there is a mass lesion in the region of the left adrenal gland which size has increased since the previous examination of December 15. And also there is a now a fullness or mild enlargement of the right adrenal gland which was not seen previously. Some adenopathy in the upper periaortic region that was seen on the PET nuclear medicine bone scan of 2018, cannot be conspicuously identified at the around of the right diaphragma crura or in the region of the celiac axis/mesenteric root. There is a lymph node in the left paraortic region which size also has increased since the study of December 15, presently measures 13 x 11 mm are. These findings are supportive for metastatic disease to the retroperitoneal region. Adrenals lesions can be on the basis of metastasis or can be large retroperitoneal bulky adenopathy obscuring the anatomy of the left adrenal.  The right adrenal gland had enlarged since the previous CT of December 2018.  The study of April 29 was done without IV contrast. If the patient cannot have IV contrast, MRI study can be helpful to better display the upper retroperitoneal structures. Also a repeated PET nuclear medicine scan can be helpful for monitoring the abnormal lymph nodes that were observed on the previous examinations. Result Date: 2019  Patient MRN:  [id number] : 1960 Age: 59 years Gender: Female Order Date:  2019 6:00 PM TECHNIQUE/NUMBER OF IMAGES/COMPARISON/CLINICAL HISTORY: CT abdomen and pelvis no contrast Axial images were obtained with sagittal and coronal reconstructions are. Comparison study December 15, 2018. CLINICAL HISTORY: 8year-old the female patient with the abdominal pain. Images: 209. FINDINGS: This has a catheter draining the left ureter. The catheter is in proper position. There is a percutaneous catheter draining the right kidney. The catheter is in proper position. There are some fullness of the collecting system of both kidneys but also kidneys or in the compressed by the catheters. There is a large mass lesion in the left adrenal gland which has increased size since the previous examination presently measures 4.5 x 2.8 cm previously it measured 2.6 x 2.2 cm there are. There is also enlargement of the right adrenal gland more prominent since the previous study. Also as observed previously there is midline retroperitoneal adenopathy with several enlarged lymph nodes around the aorta and IVC above and below the hyoid the kidneys. The liver has normal size and. The liver has a diffuse increased density up to 75 Hounsfield units are. This can be manifestation of a deposit disease as seen with hemachromatosis. Please correlate clinically. Gallbladder is nondistended. The intrahepatic biliary tree does not appears to be dilated. The pancreas is more difficult to be identified in the bladder appears otherwise unremarkable. The spleen has normal size.  There is a pattern of constipation with foramina 2019  EXAMINATION:  CT ABDOMEN AND PELVIS WITH IV CONTRAST Patient MRN:  [id number] : 1960 Age: 59 years Gender: Female Order Date:  2019 5:30 PM EXAM: CT ABDOMEN PELVIS W IV CONTRAST NUMBER OF IMAGES \ views:  200 INDICATION:  Hx metasttic cancer new onset abdominal pain COMPARISON: 2019, 12/15/2018 TECHNIQUE: CT of the abdomen and pelvis was performed using standard technique, scanning from just above the dome of the diaphragm to the symphysis pubis. Contrast: IV:  110 ml of Isovue-370 CT Radiation dose: Integrated Dose-length product (DLP) for this visit =  237 mGy*cm. CT Dose Reduction Employed: Yes RESULT: Liver: 1.7 x 1.4 cm low-attenuation lesion in the anterior left hepatic lobe (series 3 image 14 is not significantly changed since 12/15/2018. No new hepatic lesion. Biliary: Mild prominence of the intrahepatic biliary ducts, no gallbladder dilation or extrahepatic biliary ductal dilation. No focal obstruction identified. Gallbladder is unremarkable. Spleen: No mass. No splenomegaly. Pancreas: No mass or duct dilation. Adrenals: Bilateral adrenal masses have increased in size in the interval. The left adrenal mass now measures 5.0 x 4.3 cm and contains a large area of central necrosis, previously measuring approximately 2.6 x 3.2 cm without significant necrosis. The right adrenal mass measures 4.3 x 1.8 cm, previously subcentimeter also with more central necrosis in the interval. Enhancing groin lymph nodes noted. Kidneys: Bilateral ureteral stents are in place. There is a 1 cm interpolar left renal cyst. Kidneys enhance symmetrically. GI tract: No dilation or wall thickening. Large volume of stool in the colon.  Lymph nodes: Enlarged left periaortic lymph node measures 1.4 x 1.8 cm, this is increased in the interval. Additional left para-aortic lymph node measures 1.3 x 2.0 cm (series 3 image 33) this is also increased in size in the interval. Peripherally enhancing, essentially low-attenuation lesion along the left pelvic sidewall, likely partially necrotic node measures 1.4 x 3.4 cm (3:60) this was not well appreciated on the prior examination due to the lack of intravenous contrast. Mildly enlarged mesenteric lymph node measures 1 cm in short axis (3:36) Mesentery/Peritoneum: Mesenteric edema. No focal fluid collection. Retroperitoneum: Lymphadenopathy as described above. No focal Vasculature: The celiac axis and SMA are patent. The portal vein and branches, splenic vein, SMV, and hepatic veins are patent. Arterial atherosclerotic disease without aneurysm. Pelvis: No mass, ascites or fluid collection. The bladder is thick walled and there is mild mucosal enhancement. Double-J stents terminate in the bladder. Small amount of contrast layers dependently. Bones/Soft Tissues: Postsurgical changes of right hip reduction with 3 cannulated, partially threaded screws through the right femoral neck. There are bilateral sacral insufficiency fractures which are healing. Lumbar spine is unremarkable. No destructive osseous lesion remote left-sided rib fractures. Lower thorax: Nodular opacities in the left lower lobe in a tree-in-bud distribution likely reflective of infectious bronchiolitis. Follow-up is recommended. 1.  THICK WALLED ENHANCING BLADDER WHICH COULD REFLECT CYSTITIS, CORRELATION WITH URINALYSIS IS RECOMMENDED. 2.  INTERVAL INCREASE IN SIZE OF BILATERAL ADRENAL METASTASES WITH INTERVAL DEVELOPMENT OF CENTRAL NECROSIS. 3.  INTERVAL ENLARGEMENT OF RETROPERITONEAL AND MESENTERIC LYMPH NODES AS DESCRIBED. 4.  INDETERMINATE LOW-ATTENUATION LESION IN THE LIVER, IS UNCHANGED FROM 12/15/2018. 5. MILD PROMINENCE OF THE INTRAHEPATIC BILIARY TREE WITHOUT FOCAL OBSTRUCTION OR DILATION OF THE EXTRAHEPATIC BILIARY TREE, LIKELY PHYSIOLOGIC. 6.  NODULAR OPACITIES IN THE LEFT LOWER LOBE IN A TREE-IN-BUD DISTRIBUTION SUGGESTING INFECTIOUS BRONCHIOLITIS, THESE APPEAR TO BE NEW FROM 4/29/2019. bronchi are patent. No filling defects are identified in the main pulmonary artery and the central branches to suggest intrinsic pulmonary embolism. A lobulated mass is identified in the left hilum measuring 8 x 4.5 x 3.5 cm extending to the left lung apex, abutting the aortic arch and surrounding the left main pulmonary artery and narrowing and partially occluding the left upper lobe pulmonary artery. The mass also surrounding the left main bronchus and occludes one of the left pulmonary vein. Moderately enlarged lymph nodes are identified in the mediastinum with lymph nodes measuring up to 1.2 cm in the paratracheal region, subcarinal region and AP window. Lymph nodes also identified in the hilum bilaterally. There is advanced emphysema. Infiltrative nodular density in the left lower lobe measuring up to 7 mm and smaller one measuring up to 3 mm in the right upper lobe are noted. There is no focal consolidation or pleural effusion. Mucous plugging is identified in the right lower lobe. 7 mm hypodense lesion is noted in the left hepatic lobe concerning for developing hepatic metastasis. Large necrotic masses are identified in the right and left adrenal glands with the one on the right side measuring 2.3 x 4.4 cm and larger one on the left measuring 5 x 3.4 cm concerning for adrenal metastasis. The kidneys appear somewhat atrophic. A large lobulated mass in the left hilum abutting the hilar structures, the aorta and invading the left main pulmonary artery and partially occluding the left upper lobe pulmonary artery and pulmonary vein on the left side. Findings are concerning for malignancy either primary or metastasis. There is possible metastatic involvement in the right and left lungs,  adrenal metastasis and possibly liver metastasis. No other intrinsic pulmonary embolism or identified. The findings were telephoned to the ED physician. Joanna Unger  ALERT:  CRITICAL RESULT     Us Dup Lower Extremity Left Lewis    Result Date: 2019  Patient MRN: [id number] : 1960 Age:  59 years Gender: Female Order Date: 2019 3:15 AM Exam: US DUP LOWER EXTREMITY LEFT JANE Number of Images: 28 views Indication:   LEG SWELLING, PAIN, DVT SUSPECTED  Comparison: None. TECHNIQUE: Region of study: Left lower extremity. Veins in the region of study were interrogated at multiple levels using B-mode ultrasound with compression and with Doppler-derived velocity spectrum waveform analysis. Response to distal compression with flow augmentation was evaluated in the major axial veins of the thigh. FINDINGS: COMMON FEMORAL VEIN: Patent. FEMORAL VEIN: Patent. DEEP FEMORAL VEIN: Patent. POPLITEAL VEIN: Patent. TIBIAL VEINS: Patent. DEEP CALF VEINS: Patent. SUPERFICIAL VEINS: Patent. OTHER: Negative. No evidence of left lower extremity deep venous thrombosis. Xr Hip 2-3 Vw W Pelvis Right    Result Date: 2019  Patient MRN:  [id number] : 1960 Age: 59 years Gender: Female Order Date:  2019 9:56 AM EXAM: XR HIP 2-3 VW W PELVIS RIGHT NUMBER OF IMAGES:  3 views INDICATION: S72.001A Displaced fracture of right femoral neck (HCC) COMPARISON: Left hip x-ray 2019 Partial visualization of bilateral ureteral stents are seen traversing within the pelvis. . Alignment remains unchanged. No foreign body is identified. The patient is status post fixation right hip fracture with 3 compression screws. Impresion: No significant change in alignment The exam has been dictated and signed by Mayuri Bruce PA-C. Brian Galenaa MD , Radiologist, have reviewed the images and report and concur with these findings. Ir Guided Ureteral Stent Place Thru Exist Tract    Result Date: 2019  Patient MRN:  [id number] : 1960 Age: 59 years Gender: Female Order Date:  2019 10:30 AM Exam: IR GUIDED URETERAL STENT PLACE THRU EXIST TRACT Indication: Right ureteral obstruction. Placement of right ureteral stent requested. Procedures: 1. Nephrostogram through existing percutaneous nephrostomy catheter. 2. Fluoroscopic imaging-guided exchange of right percutaneous nephrostomy catheter. 3. Fluoroscopic imaging-guided placement of right double-J ureteral stent. 4. Completion nephrostogram through newly placed nephrostomy catheter. Number of Images:  14 Fluoroscopy Time: 10 minutes 31 seconds Contrast: Optiray-320 65 mL into the renal collecting system (i.e., nonvascular) Anesthesia: Monitored anesthesia care Medications: Lidocaine (2%) subcutaneous. Comparison: None. PROCEDURE DETAILS AND FINDINGS: Informed consent was obtained from the patient. The details of the procedure and its risks, benefits, and alternatives were explained. The risks include, but are not limited to, injury to the kidney, bleeding, and infection. The patient indicated understanding of what was explained, was given the opportunity to ask questions, and gave permission to proceed. The procedure was performed under monitored anesthesia care (MAC) provided by Anesthesiology staff. The patient was continuously monitored throughout the procedure. With the patient positioned prone on the exam table, the right flank and indwelling nephrostomy catheter were sterilely prepped and draped. A preprocedure timeout was performed. A fluoroscopic  image was obtained. A nephrostogram was performed with injection of contrast through the existing percutaneous nephrostomy catheter, confirming catheter position within the renal collecting system. The nephrostomy catheter was removed over an Amplatz wire. A 10-Citizen of [place] vascular sheath was advanced over the guidewire along the percutaneous tract and positioned under fluoroscopic imaging. The inner dilator was removed from the sheath. A 5-Citizen of [place] KMP catheter was advanced over a Glidewire through the sheath and, under fluoroscopic imaging, directed down the ureter and into the bladder.  The Glidewire was exchanged for a Bentson wire, which was used to estimate the length of the ureter and removed. The Bentson wire was replaced with a second Amplatz wire. Under fluoroscopic imaging, an 8-Citizen of [place] by 22-cm double-J ureteral stent was advanced over one of the Amplatz wires and deployed with its distal pigtail in the bladder and proximal pigtail in the renal pelvis. One Amplatz wire was removed as the ureteral stent was fully deployed. Next, the vascular sheath was removed over the remaining Amplatz wire. A new 8-Citizen of [place] nephrostomy catheter was advanced over the Amplatz wire into the right renal collecting system. The catheter pigtail was formed in the renal pelvis as the Amplatz wire and inner dilator were removed. A nephrostogram was performed through the newly placed nephrostomy catheter, confirming satisfactory ureteral stent and nephrostomy catheter positions. Contrast could be seen along the length of the ureter into the bladder. There were no complications. Fluoroscopic image-guided insertion of right double-J ureteral stent and exchange of right percutaneous nephrostomy catheter without complication. Assessment:   1. Lung mass  2. Stage IV cervical cancer with mets to the adrenal glands, possible liver, who now presents with a new L hilar mass,  3. COPD  4. AECOPD   5. Acute trachobronchitis  6. Weight loss  7. Emphysema  8. Adrenal Mass        Plan:   1. Treatment per oncology and XRT  2. Discussed the procedure and risk, benefits, alternative - she agrees  3. Agree with steroids and bronchodilators   4. Spiritual support   5. Dietary supplements  6. Oncology to follow  7. She want to be part of the law suit for [employer] ([place]) - maybe oncology could provide info?         Johana Moore, MPH, [facility]  Professor of Medicine  Pulmonary, Critical Care and Sleep Medicine

## 2019-05-24 NOTE — PROGRESS NOTES
Liaison Informational Visit Note      Referral received from Palliative medicine           Call back number        Patient Name: Kat Roca   :  1960  MRN:  [id number]    [facility] date:  2019    Admitted from: [facility] Admitting Physician:  Jorge Gunn MD   PCP:  Cordelia Segovia MD      Primary Insurance: Payor: Kel Vazquez /  /  /    Secondary Insurance:  medicaid    Emergency Contact:      Contact/Relation:   /         Phone:       Contact/Relation:   /     Phone:     Advance Directive  Advance directives received No  Patient has NOT completed an advance directive   Discussed with: Patient  DPOA-HC Name-Relation:    Phone:       Terminal Diagnosis Metastatic cervical cancer as confirmed by Dr. Eagle Bhat Problem List:   Patient Active Problem List   Diagnosis Code    Malignant neoplasm of exocervix ([address] C53.1    Hematuria R31.9    HTN (hypertension) I10    Acute blood loss anemia D62    Acute renal failure superimposed on stage 4 chronic kidney disease ([address] N17.9, N18.4    Lower abdominal pain R10.30    Palliative care encounter Z51.5    Pain, neoplasm-related G89.3    Goals of care, counseling/discussion Z71.89    Displaced fracture of right femoral neck ([address] S72.001A    Moderate protein-calorie malnutrition ([address] E44.0    UTI (urinary tract infection) N39.0    Palliative care by specialist Z51.5    COPD exacerbation ([address] J44.1    Lung mass R91.8    Mediastinal lymphadenopathy R59.0    Recurrent UTI N39.0    Elevated troponin R74.8    Mass of both adrenal glands (HCC) E27.9    Severe malnutrition (HCC) E43    Left leg swelling M79.89    Stage 3 chronic kidney disease ([address] N18.3    Acute respiratory failure (HCC) J96.00    PNA (pneumonia) J18.9    Mucus plugging of bronchi J98.09    Severe protein-calorie malnutrition ([address] E43       Code Status Order: DNR-CC     Past Medical History:        Diagnosis Date    COPD (chronic obstructive pulmonary disease) [facility]     Hyperlipidemia     Hypertension     PMB (postmenopausal bleeding)     Squamous cell carcinoma 02/06/2017    Invasive non-keratinizing squamous cell carcinoma with extensive squamous cell/severe dysplasia LAY 3     Past Surgical History:        Procedure Laterality Date    APPENDECTOMY      COLONOSCOPY N/A 3/13/2019    COLONOSCOPY WITH BIOPSY performed by Silva Og MD at [address]      HIP FRACTURE SURGERY Right 3/4/2019    HIP OPEN REDUCTION INTERNAL FIXATION performed by Dione Rain MD at [facility] Right     r hip surgery    LAPAROSCOPY      MOUTH SURGERY      NEPHROSTOMY Right     OTHER SURGICAL HISTORY  06/27/2017    Tandem and Ovoid insertion     OTHER SURGICAL HISTORY N/A 08/04/2017    TONSILLECTOMY      TONSILLECTOMY      URETER STENT PLACEMENT      kidney stents due to kidney failure       Allergies:  Latex; Asa [aspirin]; Other; and Contrast [barium-containing compounds]    Family Goal: undecided- receiving radiation    Meeting held with patient  The hospice benefit and philosophy was explained to patient. The following levels of care were discussed including, routine level of care at private home or facility, which hospice is not responsible for any room and board fees, and GIP level of care at the [facility] for short term symptom management. Once symptoms become managed, the patient would need to be moved to a lower level of care. Hospice House transition program also explained. Patient informed that with the routine level of care,  the hospice team consists of the RN who visits 1-3 times a week, a  who visits within the first five days of the hospice election, the personal care team who visit 1-3 times a week, non-medical volunteers and Chaplains. Patient has not decided yet if she will continue radiation.   Patient is aware that she cannot continue radiation under hospice care. Patient states she is in need of an electric wheelchair, wheelchair ramp and her bathroom remolded- she thought hospice could do this for her. I explained that hospice did not provide those services however I would speak with the hospital SW to see if she had any information regarding someone to provide these services. Patient states that has some assistance from her one son Megan Spivey but he cannot drive. Patients son Megan Spivey arrived during our conversation and he was also provided with hospice information. Explained to patient that if she continues radiation outpatient and then wants to transition to hospice, she can contact [facility] directly without returning to the hospital to start hospice care at home. Patient will let hospital staff if she opts to transition to hospice prior to discharge home. MAURO updated      Discharge Plan:  Discharge Disposition; Home  Facility Name: none    HOTV plan:  1. Hospice is not managing any patient care at this time  2. HOTV referral was for INFORMATION ONLY  3. Patient will let RN know if she would like to transition to hospice care prior to discharge home.   4.  Please contact [phone] if family does opt to transition to hospice care    Electronically signed by Tree Smalls RN on 5/24/2019 at 12:23 PM

## 2019-05-24 NOTE — PROGRESS NOTES
CLINICAL PHARMACY NOTE: MEDS TO [address] Select Patient?: No  Total # of Prescriptions Filled: 1   The following medications were delivered to the patient:  · PREDNISONE 10 MG   Total # of Interventions Completed: 3  Time Spent (min): 15    Additional Documentation:

## 2019-05-24 NOTE — PLAN OF CARE
Problem: Pain:  Goal: Pain level will decrease  Description  Pain level will decrease  5/24/2019 0240 by Sylvie Spain RN  Outcome: Ongoing  5/23/2019 2236 by Monica Padron RN  Outcome: Not Met This Shift     Problem: Anxiety:  Goal: Level of anxiety will decrease  Description  Level of anxiety will decrease  Outcome: Ongoing

## 2019-05-25 LAB
ANION GAP SERPL CALCULATED.3IONS-SCNC: 14 MMOL/L (ref 7–16)
ANISOCYTOSIS: ABNORMAL
BASOPHILS ABSOLUTE: 0.01 E9/L (ref 0–0.2)
BASOPHILS RELATIVE PERCENT: 0.1 % (ref 0–2)
BUN BLDV-MCNC: 27 MG/DL (ref 8–23)
BURR CELLS: ABNORMAL
CALCIUM SERPL-MCNC: 8.6 MG/DL (ref 8.6–10.2)
CHLORIDE BLD-SCNC: 95 MMOL/L (ref 98–107)
CO2: 24 MMOL/L (ref 22–29)
CREAT SERPL-MCNC: 1.3 MG/DL (ref 0.5–1)
CULTURE, RESPIRATORY: NORMAL
EOSINOPHILS ABSOLUTE: 0 E9/L (ref 0.05–0.5)
EOSINOPHILS RELATIVE PERCENT: 0 % (ref 0–6)
GFR AFRICAN AMERICAN: 50
GFR NON-AFRICAN AMERICAN: 42 ML/MIN/1.73
GLUCOSE BLD-MCNC: 94 MG/DL (ref 74–99)
HCT VFR BLD CALC: 37.6 % (ref 34–48)
HEMOGLOBIN: 11.6 G/DL (ref 11.5–15.5)
IMMATURE GRANULOCYTES #: 0.05 E9/L
IMMATURE GRANULOCYTES %: 0.7 % (ref 0–5)
LYMPHOCYTES ABSOLUTE: 0.27 E9/L (ref 1.5–4)
LYMPHOCYTES RELATIVE PERCENT: 3.9 % (ref 20–42)
MCH RBC QN AUTO: 29.7 PG (ref 26–35)
MCHC RBC AUTO-ENTMCNC: 30.9 % (ref 32–34.5)
MCV RBC AUTO: 96.4 FL (ref 80–99.9)
MONOCYTES ABSOLUTE: 0.57 E9/L (ref 0.1–0.95)
MONOCYTES RELATIVE PERCENT: 8.2 % (ref 2–12)
NEUTROPHILS ABSOLUTE: 6.05 E9/L (ref 1.8–7.3)
NEUTROPHILS RELATIVE PERCENT: 87.1 % (ref 43–80)
OVALOCYTES: ABNORMAL
PDW BLD-RTO: 15.5 FL (ref 11.5–15)
PLATELET # BLD: 304 E9/L (ref 130–450)
PMV BLD AUTO: 9.2 FL (ref 7–12)
POIKILOCYTES: ABNORMAL
POLYCHROMASIA: ABNORMAL
POTASSIUM REFLEX MAGNESIUM: 5.1 MMOL/L (ref 3.5–5)
RBC # BLD: 3.9 E12/L (ref 3.5–5.5)
SMEAR, RESPIRATORY: NORMAL
SODIUM BLD-SCNC: 133 MMOL/L (ref 132–146)
WBC # BLD: 7 E9/L (ref 4.5–11.5)

## 2019-05-25 PROCEDURE — 6370000000 HC RX 637 (ALT 250 FOR IP): Performed by: FAMILY MEDICINE

## 2019-05-25 PROCEDURE — 94640 AIRWAY INHALATION TREATMENT: CPT

## 2019-05-25 PROCEDURE — 85025 COMPLETE CBC W/AUTO DIFF WBC: CPT

## 2019-05-25 PROCEDURE — 2580000003 HC RX 258: Performed by: FAMILY MEDICINE

## 2019-05-25 PROCEDURE — 1200000000 HC SEMI PRIVATE

## 2019-05-25 PROCEDURE — 99233 SBSQ HOSP IP/OBS HIGH 50: CPT | Performed by: INTERNAL MEDICINE

## 2019-05-25 PROCEDURE — 6360000002 HC RX W HCPCS: Performed by: STUDENT IN AN ORGANIZED HEALTH CARE EDUCATION/TRAINING PROGRAM

## 2019-05-25 PROCEDURE — 36415 COLL VENOUS BLD VENIPUNCTURE: CPT

## 2019-05-25 PROCEDURE — 6360000002 HC RX W HCPCS: Performed by: FAMILY MEDICINE

## 2019-05-25 PROCEDURE — 94660 CPAP INITIATION&MGMT: CPT

## 2019-05-25 PROCEDURE — 6360000002 HC RX W HCPCS: Performed by: INTERNAL MEDICINE

## 2019-05-25 PROCEDURE — 6370000000 HC RX 637 (ALT 250 FOR IP): Performed by: INTERNAL MEDICINE

## 2019-05-25 PROCEDURE — 80048 BASIC METABOLIC PNL TOTAL CA: CPT

## 2019-05-25 RX ORDER — METHYLPREDNISOLONE SODIUM SUCCINATE 40 MG/ML
20 INJECTION, POWDER, LYOPHILIZED, FOR SOLUTION INTRAMUSCULAR; INTRAVENOUS EVERY 12 HOURS
Status: DISCONTINUED | OUTPATIENT
Start: 2019-05-25 | End: 2019-05-26

## 2019-05-25 RX ORDER — HYDROCODONE BITARTRATE AND ACETAMINOPHEN 10; 325 MG/1; MG/1
1 TABLET ORAL EVERY 6 HOURS PRN
Status: DISCONTINUED | OUTPATIENT
Start: 2019-05-25 | End: 2019-05-27 | Stop reason: HOSPADM

## 2019-05-25 RX ADMIN — METOCLOPRAMIDE 5 MG: 5 TABLET ORAL at 17:42

## 2019-05-25 RX ADMIN — Medication 10 ML: at 06:08

## 2019-05-25 RX ADMIN — CEFTRIAXONE SODIUM 1 G: 1 INJECTION, POWDER, FOR SOLUTION INTRAMUSCULAR; INTRAVENOUS at 20:49

## 2019-05-25 RX ADMIN — MORPHINE SULFATE 2 MG: 2 INJECTION, SOLUTION INTRAMUSCULAR; INTRAVENOUS at 06:09

## 2019-05-25 RX ADMIN — ONDANSETRON HYDROCHLORIDE 4 MG: 2 SOLUTION INTRAMUSCULAR; INTRAVENOUS at 20:53

## 2019-05-25 RX ADMIN — Medication 10 ML: at 20:49

## 2019-05-25 RX ADMIN — METOCLOPRAMIDE 5 MG: 5 TABLET ORAL at 08:36

## 2019-05-25 RX ADMIN — ONDANSETRON HYDROCHLORIDE 4 MG: 2 SOLUTION INTRAMUSCULAR; INTRAVENOUS at 08:37

## 2019-05-25 RX ADMIN — DOCUSATE SODIUM 100 MG: 100 CAPSULE, LIQUID FILLED ORAL at 08:37

## 2019-05-25 RX ADMIN — GABAPENTIN 600 MG: 600 TABLET, FILM COATED ORAL at 13:01

## 2019-05-25 RX ADMIN — MORPHINE SULFATE 2 MG: 2 INJECTION, SOLUTION INTRAMUSCULAR; INTRAVENOUS at 10:50

## 2019-05-25 RX ADMIN — IPRATROPIUM BROMIDE AND ALBUTEROL SULFATE 1 AMPULE: .5; 3 SOLUTION RESPIRATORY (INHALATION) at 15:43

## 2019-05-25 RX ADMIN — MORPHINE SULFATE 2 MG: 2 INJECTION, SOLUTION INTRAMUSCULAR; INTRAVENOUS at 18:31

## 2019-05-25 RX ADMIN — GABAPENTIN 600 MG: 600 TABLET, FILM COATED ORAL at 08:37

## 2019-05-25 RX ADMIN — HYDROCODONE BITARTRATE AND ACETAMINOPHEN 1 TABLET: 10; 325 TABLET ORAL at 14:25

## 2019-05-25 RX ADMIN — METHYLPREDNISOLONE SODIUM SUCCINATE 20 MG: 40 INJECTION, POWDER, LYOPHILIZED, FOR SOLUTION INTRAMUSCULAR; INTRAVENOUS at 20:53

## 2019-05-25 RX ADMIN — Medication 10 ML: at 01:07

## 2019-05-25 RX ADMIN — ONDANSETRON HYDROCHLORIDE 4 MG: 2 SOLUTION INTRAMUSCULAR; INTRAVENOUS at 14:49

## 2019-05-25 RX ADMIN — GABAPENTIN 600 MG: 600 TABLET, FILM COATED ORAL at 20:50

## 2019-05-25 RX ADMIN — DOCUSATE SODIUM 100 MG: 100 CAPSULE, LIQUID FILLED ORAL at 20:50

## 2019-05-25 RX ADMIN — HYDROCODONE BITARTRATE AND ACETAMINOPHEN 1 TABLET: 5; 325 TABLET ORAL at 04:52

## 2019-05-25 RX ADMIN — SERTRALINE 50 MG: 50 TABLET, FILM COATED ORAL at 08:36

## 2019-05-25 RX ADMIN — Medication 10 ML: at 09:00

## 2019-05-25 RX ADMIN — HYDROCODONE BITARTRATE AND ACETAMINOPHEN 1 TABLET: 10; 325 TABLET ORAL at 20:52

## 2019-05-25 RX ADMIN — MORPHINE SULFATE 2 MG: 2 INJECTION, SOLUTION INTRAMUSCULAR; INTRAVENOUS at 01:07

## 2019-05-25 RX ADMIN — METOCLOPRAMIDE 5 MG: 5 TABLET ORAL at 20:50

## 2019-05-25 RX ADMIN — IPRATROPIUM BROMIDE AND ALBUTEROL SULFATE 1 AMPULE: .5; 3 SOLUTION RESPIRATORY (INHALATION) at 11:46

## 2019-05-25 RX ADMIN — METOCLOPRAMIDE 5 MG: 5 TABLET ORAL at 13:01

## 2019-05-25 RX ADMIN — OXYBUTYNIN CHLORIDE 5 MG: 5 TABLET ORAL at 08:36

## 2019-05-25 RX ADMIN — IPRATROPIUM BROMIDE AND ALBUTEROL SULFATE 1 AMPULE: .5; 3 SOLUTION RESPIRATORY (INHALATION) at 20:14

## 2019-05-25 RX ADMIN — METHYLPREDNISOLONE SODIUM SUCCINATE 20 MG: 40 INJECTION, POWDER, FOR SOLUTION INTRAMUSCULAR; INTRAVENOUS at 08:37

## 2019-05-25 RX ADMIN — METHYLPREDNISOLONE SODIUM SUCCINATE 20 MG: 40 INJECTION, POWDER, FOR SOLUTION INTRAMUSCULAR; INTRAVENOUS at 01:05

## 2019-05-25 RX ADMIN — Medication 10 ML: at 06:09

## 2019-05-25 ASSESSMENT — PAIN DESCRIPTION - FREQUENCY
FREQUENCY: CONTINUOUS

## 2019-05-25 ASSESSMENT — PAIN DESCRIPTION - DESCRIPTORS
DESCRIPTORS: ACHING;CONSTANT;DISCOMFORT

## 2019-05-25 ASSESSMENT — PAIN DESCRIPTION - ONSET
ONSET: ON-GOING

## 2019-05-25 ASSESSMENT — PAIN DESCRIPTION - LOCATION
LOCATION: ABDOMEN;GENERALIZED

## 2019-05-25 ASSESSMENT — PAIN SCALES - GENERAL
PAINLEVEL_OUTOF10: 9
PAINLEVEL_OUTOF10: 8
PAINLEVEL_OUTOF10: 10
PAINLEVEL_OUTOF10: 8
PAINLEVEL_OUTOF10: 6
PAINLEVEL_OUTOF10: 5
PAINLEVEL_OUTOF10: 10
PAINLEVEL_OUTOF10: 9
PAINLEVEL_OUTOF10: 10
PAINLEVEL_OUTOF10: 0
PAINLEVEL_OUTOF10: 10
PAINLEVEL_OUTOF10: 10
PAINLEVEL_OUTOF10: 9

## 2019-05-25 ASSESSMENT — PAIN DESCRIPTION - PAIN TYPE
TYPE: CHRONIC PAIN

## 2019-05-25 ASSESSMENT — PAIN DESCRIPTION - PROGRESSION
CLINICAL_PROGRESSION: NOT CHANGED

## 2019-05-25 ASSESSMENT — PAIN DESCRIPTION - ORIENTATION
ORIENTATION: LEFT

## 2019-05-25 ASSESSMENT — PAIN - FUNCTIONAL ASSESSMENT
PAIN_FUNCTIONAL_ASSESSMENT: PREVENTS OR INTERFERES SOME ACTIVE ACTIVITIES AND ADLS

## 2019-05-25 NOTE — PROGRESS NOTES
[place]  Department of Pulmonary, Critical Care and Sleep Medicine  Progess Note    Patient: Justin Henry  MRN: [id number]  : 1960    Encounter Time: 12:18 PM     Date of Admission: 2019  3:30 PM    Primary Care Physician: Khushi Nava MD    Reason for Consultation: COPD and lung mass     SUBJECTIVE:    This hospital is unable to accomodates a routine bronchoscopy - procedure was canceled. She states \" im overwhelmed\"   SHe is breathing better  She wants her kidney stent removed and I asked why states it to be changed 6 months ago mbut doestn know who did it. She states it hurts so I will increase the norco (she states she takes 10)      OBJECTIVE:     PHYSICAL EXAM:   VITALS:     Intake/Output Summary (Last 24 hours) at 2019 1218  Last data filed at 2019 1204  Gross per 24 hour   Intake 480 ml   Output 310 ml   Net 170 ml        CONSTITUTIONAL:    A&O x 3,   SKIN:     No rash,  HEENT:     EOMI, MMM, No thrush  NECK:    No bruits, No JVP apprechiated  CV:      Sinus,  No murmus, No Rubs, No gallops  PULMONARY:   Aysmeteric Couse BS,  No Wheezing, No Rales, No Rhonchi  ABDOMEN:     Soft, non-tender. BS normal. No R/R/G  EXT:    No deformities . No clubbing. No lower extremity edema, No venous stasis  PULSE:   Appears equal and palpable.   PSYCHIATRIC:  Seems appropriate, No acute psycosis, scared  MS:    No fractures, No gross weakness  NEUROLOGIC:   The exam is non-focal     DATA: IMAGING & TESTING:     LABORATORY TESTS:    CBC:   Lab Results   Component Value Date    WBC 7.0 2019    RBC 3.90 2019    HGB 11.6 2019    HCT 37.6 2019    MCV 96.4 2019    MCH 29.7 2019    MCHC 30.9 2019    RDW 15.5 2019     2019    MPV 9.2 2019     CMP:    Lab Results   Component Value Date     2019    K 5.1 2019    CL 95 2019    CO2 24 2019    BUN 27 2019 CREATININE 1.3 05/25/2019    GFRAA 50 05/25/2019    LABGLOM 42 05/25/2019    GLUCOSE 94 05/25/2019    PROT 7.5 05/22/2019    LABALBU 3.4 05/22/2019    CALCIUM 8.6 05/25/2019    BILITOT <0.2 05/22/2019    ALKPHOS 93 05/22/2019    AST 21 05/22/2019    ALT 8 05/22/2019        PRO-BNP:   Lab Results   Component Value Date    PROBNP 3,660 (H) 05/23/2019      ABGs:   Lab Results   Component Value Date    PH 7.394 05/23/2019    PO2 49.6 05/23/2019    PCO2 37.2 05/23/2019     Hemoglobin A1C: No components found for: HGBA1C    IMAGING:  Imaging tests were completed and reviewed and discussed radiology and care team involved and reveals   Ct Abdomen Pelvis Wo Contrast    Addendum Date: 5/9/2019    Reviewed the previous CT abdomen and pelvis dated of April 29, 2019. Also reviewed the previous PET nuclear medicine scan study dated May 30 and October 8, 2018 and previous CAT scan examinations of the abdomen and pelvis of January 11, 2017, November 10, 2018, December 15, 2018. Patient has history of cervical malignancy. Ureter stents have been applied to decompress the distal ureters. I could not identified a conspicuously mesenteric mass or bulky adenopathy. As reported on the study of April 2019, 2018 there is a mass lesion in the region of the left adrenal gland which size has increased since the previous examination of December 15. And also there is a now a fullness or mild enlargement of the right adrenal gland which was not seen previously. Some adenopathy in the upper periaortic region that was seen on the PET nuclear medicine bone scan of 2018, cannot be conspicuously identified at the around of the right diaphragma crura or in the region of the celiac axis/mesenteric root. There is a lymph node in the left paraortic region which size also has increased since the study of December 15, presently measures 13 x 11 mm are. These findings are supportive for metastatic disease to the retroperitoneal region.  Adrenals lesions can be on the basis of metastasis or can be large retroperitoneal bulky adenopathy obscuring the anatomy of the left adrenal.  The right adrenal gland had enlarged since the previous CT of 2018. The study of   was done without IV contrast. If the patient cannot have IV contrast, MRI study can be helpful to better display the upper retroperitoneal structures. Also a repeated PET nuclear medicine scan can be helpful for monitoring the abnormal lymph nodes that were observed on the previous examinations. Result Date: 2019  Patient MRN:  [id number] : 1960 Age: 59 years Gender: Female Order Date:  2019 6:00 PM TECHNIQUE/NUMBER OF IMAGES/COMPARISON/CLINICAL HISTORY: CT abdomen and pelvis no contrast Axial images were obtained with sagittal and coronal reconstructions are. Comparison study December 15, 2018. CLINICAL HISTORY: 8year-old the female patient with the abdominal pain. Images: 209. FINDINGS: This has a catheter draining the left ureter. The catheter is in proper position. There is a percutaneous catheter draining the right kidney. The catheter is in proper position. There are some fullness of the collecting system of both kidneys but also kidneys or in the compressed by the catheters. There is a large mass lesion in the left adrenal gland which has increased size since the previous examination presently measures 4.5 x 2.8 cm previously it measured 2.6 x 2.2 cm there are. There is also enlargement of the right adrenal gland more prominent since the previous study. Also as observed previously there is midline retroperitoneal adenopathy with several enlarged lymph nodes around the aorta and IVC above and below the hyoid the kidneys. The liver has normal size and. The liver has a diffuse increased density up to 75 Hounsfield units are. This can be manifestation of a deposit disease as seen with hemachromatosis. Please correlate clinically. Gallbladder is nondistended.  The intrahepatic biliary tree does not appears to be dilated. The pancreas is more difficult to be identified in the bladder appears otherwise unremarkable. The spleen has normal size. There is a pattern of constipation with foramina multiple fecal content throughout the entire colon. Some degree of edema is seen in the retroperitoneal space particularly the presacral area. Minimal ascites seen in the abdomen particularly just underneath of the liver and spleen and also a small amount in the pelvic area. Aorta demonstrates some mild atherosclerotic changes. No free intraperitoneal air is seen. Lower lung bases demonstrate no significant findings are. Patient has a previous pinning for a now old fracture of the right femoral head. The bones otherwise appear unremarkable. 1. Left ureteric catheters are in proper position, draining the left kidney. 2. Catheter draining the left kidney in proper position. 3. Some degree of anasarca and discrete ascites. 4. Pattern of constipation. 5. Bilateral adrenal mass which can further increased size since previous examination of 2018. Metastasis to be considered. Please correlate clinically. 6. Midline retroperitoneal adenopathy. Xr Abdomen (kub) (single Ap View)    Result Date: 2019  Patient MRN: [id number] : 1960 Age:  59 years Gender: Female Order Date: 2019 2:15 PM Exam: XR ABDOMEN (KUB) (SINGLE AP VIEW) Number of Images: 2 views Indication:   stent position stent position Comparison: None. Findings: The bowel gas pattern is normal. There is a right-sided Universal stent with the proximal loop in the right renal pelvis and distal loop is in the bladder. There is a left-sided internal ureteral stent with the proximal loop in the left renal pelvis and distal loop in the urinary bladder. No evidence of organomegaly or abnormal calcifications seen.  The osseous structures of the abdomen and pelvis appear to be normal. 3 fixation screws seen in the right hip which was seen before and appears to be unchanged. NON-SPECIFIC GAS PATTERN There is bilateral ureteral stents in place as described above. Ct Abdomen Pelvis W Iv Contrast Additional Contrast? None    Result Date: 2019  EXAMINATION:  CT ABDOMEN AND PELVIS WITH IV CONTRAST Patient MRN:  [id number] : 1960 Age: 59 years Gender: Female Order Date:  2019 5:30 PM EXAM: CT ABDOMEN PELVIS W IV CONTRAST NUMBER OF IMAGES \ views:  200 INDICATION:  Hx metasttic cancer new onset abdominal pain COMPARISON: 2019, 12/15/2018 TECHNIQUE: CT of the abdomen and pelvis was performed using standard technique, scanning from just above the dome of the diaphragm to the symphysis pubis. Contrast: IV:  110 ml of Isovue-370 CT Radiation dose: Integrated Dose-length product (DLP) for this visit =  237 mGy*cm. CT Dose Reduction Employed: Yes RESULT: Liver: 1.7 x 1.4 cm low-attenuation lesion in the anterior left hepatic lobe (series 3 image 14 is not significantly changed since 12/15/2018. No new hepatic lesion. Biliary: Mild prominence of the intrahepatic biliary ducts, no gallbladder dilation or extrahepatic biliary ductal dilation. No focal obstruction identified. Gallbladder is unremarkable. Spleen: No mass. No splenomegaly. Pancreas: No mass or duct dilation. Adrenals: Bilateral adrenal masses have increased in size in the interval. The left adrenal mass now measures 5.0 x 4.3 cm and contains a large area of central necrosis, previously measuring approximately 2.6 x 3.2 cm without significant necrosis. The right adrenal mass measures 4.3 x 1.8 cm, previously subcentimeter also with more central necrosis in the interval. Enhancing groin lymph nodes noted. Kidneys: Bilateral ureteral stents are in place. There is a 1 cm interpolar left renal cyst. Kidneys enhance symmetrically. GI tract: No dilation or wall thickening. Large volume of stool in the colon.  Lymph nodes: Enlarged left periaortic lymph node measures 1.4 x 1.8 cm, this is increased in the interval. Additional left para-aortic lymph node measures 1.3 x 2.0 cm (series 3 image 33) this is also increased in size in the interval. Peripherally enhancing, essentially low-attenuation lesion along the left pelvic sidewall, likely partially necrotic node measures 1.4 x 3.4 cm (3:60) this was not well appreciated on the prior examination due to the lack of intravenous contrast. Mildly enlarged mesenteric lymph node measures 1 cm in short axis (3:36) Mesentery/Peritoneum: Mesenteric edema. No focal fluid collection. Retroperitoneum: Lymphadenopathy as described above. No focal Vasculature: The celiac axis and SMA are patent. The portal vein and branches, splenic vein, SMV, and hepatic veins are patent. Arterial atherosclerotic disease without aneurysm. Pelvis: No mass, ascites or fluid collection. The bladder is thick walled and there is mild mucosal enhancement. Double-J stents terminate in the bladder. Small amount of contrast layers dependently. Bones/Soft Tissues: Postsurgical changes of right hip reduction with 3 cannulated, partially threaded screws through the right femoral neck. There are bilateral sacral insufficiency fractures which are healing. Lumbar spine is unremarkable. No destructive osseous lesion remote left-sided rib fractures. Lower thorax: Nodular opacities in the left lower lobe in a tree-in-bud distribution likely reflective of infectious bronchiolitis. Follow-up is recommended. 1.  THICK WALLED ENHANCING BLADDER WHICH COULD REFLECT CYSTITIS, CORRELATION WITH URINALYSIS IS RECOMMENDED. 2.  INTERVAL INCREASE IN SIZE OF BILATERAL ADRENAL METASTASES WITH INTERVAL DEVELOPMENT OF CENTRAL NECROSIS. 3.  INTERVAL ENLARGEMENT OF RETROPERITONEAL AND MESENTERIC LYMPH NODES AS DESCRIBED. 4.  INDETERMINATE LOW-ATTENUATION LESION IN THE LIVER, IS UNCHANGED FROM 12/15/2018. 5.   MILD PROMINENCE OF THE INTRAHEPATIC BILIARY TREE WITHOUT FOCAL OBSTRUCTION OR DILATION OF THE KV according to patient's size or 3. Use of iterative reconstruction. INDICATION:  suspect PE  COMPARISON: None FINDINGS: The heart and the great vessels are unremarkable. The trachea and major bronchi are patent. No filling defects are identified in the main pulmonary artery and the central branches to suggest intrinsic pulmonary embolism. A lobulated mass is identified in the left hilum measuring 8 x 4.5 x 3.5 cm extending to the left lung apex, abutting the aortic arch and surrounding the left main pulmonary artery and narrowing and partially occluding the left upper lobe pulmonary artery. The mass also surrounding the left main bronchus and occludes one of the left pulmonary vein. Moderately enlarged lymph nodes are identified in the mediastinum with lymph nodes measuring up to 1.2 cm in the paratracheal region, subcarinal region and AP window. Lymph nodes also identified in the hilum bilaterally. There is advanced emphysema. Infiltrative nodular density in the left lower lobe measuring up to 7 mm and smaller one measuring up to 3 mm in the right upper lobe are noted. There is no focal consolidation or pleural effusion. Mucous plugging is identified in the right lower lobe. 7 mm hypodense lesion is noted in the left hepatic lobe concerning for developing hepatic metastasis. Large necrotic masses are identified in the right and left adrenal glands with the one on the right side measuring 2.3 x 4.4 cm and larger one on the left measuring 5 x 3.4 cm concerning for adrenal metastasis. The kidneys appear somewhat atrophic. A large lobulated mass in the left hilum abutting the hilar structures, the aorta and invading the left main pulmonary artery and partially occluding the left upper lobe pulmonary artery and pulmonary vein on the left side. Findings are concerning for malignancy either primary or metastasis.  There is possible metastatic involvement in the right and left lungs,  adrenal metastasis and possibly liver metastasis. No other intrinsic pulmonary embolism or identified. The findings were telephoned to the ED physician. Conrad Teran ALERT:  CRITICAL RESULT     Us Dup Lower Extremity Left Jane    Result Date: 2019  Patient MRN: [id number] : 1960 Age:  59 years Gender: Female Order Date: 2019 3:15 AM Exam: US DUP LOWER EXTREMITY LEFT JANE Number of Images: 28 views Indication:   LEG SWELLING, PAIN, DVT SUSPECTED  Comparison: None. TECHNIQUE: Region of study: Left lower extremity. Veins in the region of study were interrogated at multiple levels using B-mode ultrasound with compression and with Doppler-derived velocity spectrum waveform analysis. Response to distal compression with flow augmentation was evaluated in the major axial veins of the thigh. FINDINGS: COMMON FEMORAL VEIN: Patent. FEMORAL VEIN: Patent. DEEP FEMORAL VEIN: Patent. POPLITEAL VEIN: Patent. TIBIAL VEINS: Patent. DEEP CALF VEINS: Patent. SUPERFICIAL VEINS: Patent. OTHER: Negative. No evidence of left lower extremity deep venous thrombosis. Xr Hip 2-3 Vw W Pelvis Right    Result Date: 2019  Patient MRN:  [id number] : 1960 Age: 59 years Gender: Female Order Date:  2019 9:56 AM EXAM: XR HIP 2-3 VW W PELVIS RIGHT NUMBER OF IMAGES:  3 views INDICATION: S72.001A Displaced fracture of right femoral neck (HCC) COMPARISON: Left hip x-ray 2019 Partial visualization of bilateral ureteral stents are seen traversing within the pelvis. . Alignment remains unchanged. No foreign body is identified. The patient is status post fixation right hip fracture with 3 compression screws. Impresion: No significant change in alignment The exam has been dictated and signed by Meryl Monterroso PA-C. Brian Triplett MD , Radiologist, have reviewed the images and report and concur with these findings.     Ir Guided Ureteral Stent Place Thru Exist Tract    Result Date: 2019  Patient MRN:  [id number] : 1960 Age: 59 years Gender: Female Order Date:  5/1/2019 10:30 AM Exam: IR GUIDED URETERAL STENT PLACE THRU EXIST TRACT Indication: Right ureteral obstruction. Placement of right ureteral stent requested. Procedures: 1. Nephrostogram through existing percutaneous nephrostomy catheter. 2. Fluoroscopic imaging-guided exchange of right percutaneous nephrostomy catheter. 3. Fluoroscopic imaging-guided placement of right double-J ureteral stent. 4. Completion nephrostogram through newly placed nephrostomy catheter. Number of Images:  14 Fluoroscopy Time: 10 minutes 31 seconds Contrast: Optiray-320 65 mL into the renal collecting system (i.e., nonvascular) Anesthesia: Monitored anesthesia care Medications: Lidocaine (2%) subcutaneous. Comparison: None. PROCEDURE DETAILS AND FINDINGS: Informed consent was obtained from the patient. The details of the procedure and its risks, benefits, and alternatives were explained. The risks include, but are not limited to, injury to the kidney, bleeding, and infection. The patient indicated understanding of what was explained, was given the opportunity to ask questions, and gave permission to proceed. The procedure was performed under monitored anesthesia care (MAC) provided by Anesthesiology staff. The patient was continuously monitored throughout the procedure. With the patient positioned prone on the exam table, the right flank and indwelling nephrostomy catheter were sterilely prepped and draped. A preprocedure timeout was performed. A fluoroscopic  image was obtained. A nephrostogram was performed with injection of contrast through the existing percutaneous nephrostomy catheter, confirming catheter position within the renal collecting system. The nephrostomy catheter was removed over an Amplatz wire. A 10-Monegasque vascular sheath was advanced over the guidewire along the percutaneous tract and positioned under fluoroscopic imaging. The inner dilator was removed from the sheath.  A 5-Monegasque KMP catheter was advanced over a Glidewire through the sheath and, under fluoroscopic imaging, directed down the ureter and into the bladder. The Glidewire was exchanged for a Bentson wire, which was used to estimate the length of the ureter and removed. The Bentson wire was replaced with a second Amplatz wire. Under fluoroscopic imaging, an 8-[place] Islander by 22-cm double-J ureteral stent was advanced over one of the Amplatz wires and deployed with its distal pigtail in the bladder and proximal pigtail in the renal pelvis. One Amplatz wire was removed as the ureteral stent was fully deployed. Next, the vascular sheath was removed over the remaining Amplatz wire. A new 8-[place] Islander nephrostomy catheter was advanced over the Amplatz wire into the right renal collecting system. The catheter pigtail was formed in the renal pelvis as the Amplatz wire and inner dilator were removed. A nephrostogram was performed through the newly placed nephrostomy catheter, confirming satisfactory ureteral stent and nephrostomy catheter positions. Contrast could be seen along the length of the ureter into the bladder. There were no complications. Fluoroscopic image-guided insertion of right double-J ureteral stent and exchange of right percutaneous nephrostomy catheter without complication. Assessment:   1. Lung mass  2. Stage IV cervical cancer with mets to the adrenal glands, possible liver, who now presents with a new L hilar mass,  3. COPD  4. AECOPD   5. Acute trachobronchitis  6. Weight loss  7. Emphysema  8. Adrenal Mass        Plan:   1. Treatment per oncology and XRT  2. Adjust pain medication  3. Discussed the procedure and risk, benefits, alternative - she agrees  4. Agree with steroids and bronchodilators   5. Spiritual support   6. Dietary supplements  7. Oncology to follow  8. She want to be part of the law suit for Sasha (Mayuri Vaughan) - maybe oncology could provide info? 9.  Wean steroids to q12        Judy Adair DO, MPH, [facility]ig[facility]  Professor of Medicine  Pulmonary, Critical Care and Sleep Medicine

## 2019-05-25 NOTE — PROGRESS NOTES
Consult was called to [facility] and accepted by TAB Orozco. Dr. Julio Cesar Christianson is on call.

## 2019-05-25 NOTE — PROGRESS NOTES
Subjective: The patient is awake and alert. Getting respiratory treatment currently. She reports no problems overnight. Denies chest pain, angina, abdominal discomfort. Complains of decreased appetite, intermittent nausea but no emesis. Has dyspnea with minimal exertion. Received 2nd dose of XRT yesterday. Objective:    /60   Pulse 97   Temp 98.4 °F (36.9 °C) (Temporal)   Resp 16   Ht 5' 3\" (1.6 m)   Wt 97 lb 9.6 oz (44.3 kg)   SpO2 94%   BMI 17.29 kg/m²     General: NAD  HEENT: No thrush or mucositis, EOMI  Heart:  RRR, no murmurs, gallops, or rubs. Lungs:  Decreased BS bilaterally with wheezes and rhonchi. Abd: Mild generalized ttp. BS present, nondistended, no masses  Extrem:  No clubbing, cyanosis.  BLE L>R edema  Lymphatics: No palpable adenopathy in cervical and supraclavicular regions  Skin: Intact, no petechia or purpura    CBC with Differential:    Lab Results   Component Value Date    WBC 7.0 05/25/2019    RBC 3.90 05/25/2019    HGB 11.6 05/25/2019    HCT 37.6 05/25/2019     05/25/2019    MCV 96.4 05/25/2019    MCH 29.7 05/25/2019    MCHC 30.9 05/25/2019    RDW 15.5 05/25/2019    LYMPHOPCT 3.9 05/25/2019    MONOPCT 8.2 05/25/2019    BASOPCT 0.1 05/25/2019    MONOSABS 0.57 05/25/2019    LYMPHSABS 0.27 05/25/2019    EOSABS 0.00 05/25/2019    BASOSABS 0.01 05/25/2019     CMP:    Lab Results   Component Value Date     05/25/2019    K 5.1 05/25/2019    CL 95 05/25/2019    CO2 24 05/25/2019    BUN 27 05/25/2019    CREATININE 1.3 05/25/2019    GFRAA 50 05/25/2019    LABGLOM 42 05/25/2019    GLUCOSE 94 05/25/2019    PROT 7.5 05/22/2019    LABALBU 3.4 05/22/2019    CALCIUM 8.6 05/25/2019    BILITOT <0.2 05/22/2019    ALKPHOS 93 05/22/2019    AST 21 05/22/2019    ALT 8 05/22/2019      [id number]}     Current Facility-Administered Medications:     methylPREDNISolone sodium (SOLU-MEDROL) injection 20 mg, 20 mg, Intravenous, Q12H, MD Anna Mosley HYDROcodone-acetaminophen (NORCO)  MG per tablet 1 tablet, 1 tablet, Oral, Q6H PRN, Saturnino Fong DO, 1 tablet at 05/25/19 1425    cefTRIAXone (ROCEPHIN) 1 g in sterile water 10 mL IV syringe, 1 g, Intravenous, Q24H, Ulysses Richards MD, 1 g at 05/24/19 2002    acetaminophen (TYLENOL) tablet 650 mg, 650 mg, Oral, Q6H PRN, Ulysses Richards MD, 650 mg at 05/24/19 0041    docusate sodium (COLACE) capsule 100 mg, 100 mg, Oral, BID, Ulysses Richards MD, 100 mg at 05/25/19 0837    gabapentin (NEURONTIN) tablet 600 mg, 600 mg, Oral, TID, Ulysses Richards MD, 600 mg at 05/25/19 1301    methadone (DOLOPHINE) tablet 10 mg, 10 mg, Oral, Q8H PRN, Ulysses Richards MD, 10 mg at 05/24/19 0631    metoclopramide (REGLAN) tablet 5 mg, 5 mg, Oral, 4x Daily, Ulysses Richards MD, 5 mg at 05/25/19 1301    oxybutynin (DITROPAN) tablet 5 mg, 5 mg, Oral, Daily, Ulysses Richards MD, 5 mg at 05/25/19 0836    sertraline (ZOLOFT) tablet 50 mg, 50 mg, Oral, Daily, Ulysses Richards MD, 50 mg at 05/25/19 0836    sodium chloride flush 0.9 % injection 10 mL, 10 mL, Intravenous, 2 times per day, Ulysses Richards MD, 10 mL at 05/25/19 0900    sodium chloride flush 0.9 % injection 10 mL, 10 mL, Intravenous, PRN, Ulysses Richards MD, 10 mL at 05/25/19 0609    magnesium hydroxide (MILK OF MAGNESIA) 400 MG/5ML suspension 30 mL, 30 mL, Oral, Daily PRN, Ulysses Richards MD    ondansetron (ZOFRAN) injection 4 mg, 4 mg, Intravenous, Q6H PRN, Ulysses Richards MD, 4 mg at 05/25/19 1449    ipratropium-albuterol (DUONEB) nebulizer solution 1 ampule, 1 ampule, Inhalation, 4x daily, Ulysses Richards MD, 1 ampule at 05/25/19 1543    ipratropium-albuterol (DUONEB) nebulizer solution 1 ampule, 1 ampule, Inhalation, Q4H PRN, Ulysses Richards MD    guaiFENesin-dextromethorphan (ROBITUSSIN DM) 100-10 MG/5ML syrup 5 mL, 5 mL, Oral, Q4H PRN, Ulysses Richards MD, 5 mL at 05/24/19 0631    fentaNYL (DURAGESIC) 100 MCG/HR 1 patch, 1 patch, Transdermal, Q72H, Kat Given, MD, 1 patch at 05/23/19 1220    analgesic ointment ointment, , Topical, PRN, Kat Given, MD    albuterol (PROVENTIL) nebulizer solution 2.5 mg, 2.5 mg, Nebulization, Q4H PRN, Kat Given, MD    morphine (PF) injection 2 mg, 2 mg, Intravenous, Q4H PRN, Kat Given, MD, 2 mg at 05/25/19 1050    Assessment:    Active Problems:    Malignant neoplasm of exocervix (HCC)    Hematuria    Pain, neoplasm-related    Goals of care, counseling/discussion    COPD exacerbation (HCC)    Lung mass    Mediastinal lymphadenopathy    Recurrent UTI    Elevated troponin    Mass of both adrenal glands (HCC)    Severe malnutrition (HCC)    Left leg swelling    Stage 3 chronic kidney disease (HCC)    Acute respiratory failure (HCC)    PNA (pneumonia)    Mucus plugging of bronchi    Severe protein-calorie malnutrition ([address]  Resolved Problems:    * No resolved hospital problems. *    78-year-old female known to Dr. Duyen Stanton with stage IIIB cervical cancer with PDL1 expression 50%. Treated with concurrent chemoRT with weekly Cisplatin followed by brachytherapy completed Aug 2017. Recurrent/stage IV disease into the L clavicle, abdominal and pelvic LAD and treated with Carbo/Taxol/Avastin. Progression on PET from Oct 2018. Intolerant to Navelbine. Currently on immunotherapy with Keytruda since 12/13/18 for 6 cycles, last dose on 5/15/19.    -Admitted with worsening dyspnea and cough. CTA chest 5/22/19 revealed :   A large lobulated mass in the left hilum abutting the hilar   structures, the aorta and invading the left main pulmonary artery and   partially occluding the left upper lobe pulmonary artery and pulmonary   vein on the left side. Findings are concerning for malignancy either   primary or metastasis. There is possible metastatic involvement in the   right and left lungs,  adrenal metastasis and possibly liver   metastasis.  No other intrinsic pulmonary embolism or identified. CT abdomen pelvis 5/22/19  1.  THICK WALLED ENHANCING BLADDER WHICH COULD REFLECT CYSTITIS,   CORRELATION WITH URINALYSIS IS RECOMMENDED. 2.  INTERVAL INCREASE IN SIZE OF BILATERAL ADRENAL METASTASES WITH   INTERVAL DEVELOPMENT OF CENTRAL NECROSIS. 3.  INTERVAL ENLARGEMENT OF RETROPERITONEAL AND MESENTERIC LYMPH NODES   AS DESCRIBED. 4.  INDETERMINATE LOW-ATTENUATION LESION IN THE LIVER, IS UNCHANGED   FROM 12/15/2018. 5.  MILD PROMINENCE OF THE INTRAHEPATIC BILIARY TREE WITHOUT FOCAL   OBSTRUCTION OR DILATION OF THE EXTRAHEPATIC BILIARY TREE, LIKELY   PHYSIOLOGIC. 6.  NODULAR OPACITIES IN THE LEFT LOWER LOBE IN A TREE-IN-BUD   DISTRIBUTION SUGGESTING INFECTIOUS BRONCHIOLITIS, THESE APPEAR TO BE   NEW FROM 4/29/2019. FOLLOW-UP IS RECOMMENDED TO ENSURE RESOLUTION. 7.  BILATERAL SACRAL INSUFFICIENCY FRACTURES       Plan:  She has evidence of disease progression with new L hilar mass, primary lung or metastatic cervical.    Continue with Palliative XRT, has completed 2 fractions so far. Continue current pain regimen. Follow up with Dr. Kathy Andrade after discharge.        Electronically signed by Cherry Schofield MD on 5/25/2019 at 4:05 PM

## 2019-05-25 NOTE — PROGRESS NOTES
Date: 5/24/2019    Time: 10:08 PM    Patient Placed On BIPAP/CPAP/ Non-Invasive Ventilation? Yes    If no must comment. Facial area red/color change? No           If YES are Blister/Lesion present? No   If yes must notify nursing staff  BIPAP/CPAP skin barrier?   Yes    Skin barrier type:Liquicel       Comments:        Syed Torres

## 2019-05-25 NOTE — PROGRESS NOTES
Hospitalist Progress Note      PCP: Aj Sheppard MD    Date of Admission: 5/22/2019  Days in the hospital: 3    Chief Complaint: SOB    Hospital Course:     Admitted for Acute respiratory failure with hypoxia and new oxygen requirement. She has metastatic disease in her lungs surrounding the left main bronchus. History of Metastatic cervical cancer. Worsening metastatic disease despite chemotherapy and radiation. Pulmonary, palliative, oncology were consulted. She was started on palliative radiation treatment      Subjective  Patient seen and examined at bedside. NAD, on RA, no overnight events. She is anxious. No hematuria. Has no complaints this morning. Patient denies any fevers, chills, abd pain,  nausea, vomiting. Medications:  Reviewed    Infusion Medications   Scheduled Medications    methylPREDNISolone  20 mg Intravenous Q8H    cefTRIAXone (ROCEPHIN) IV  1 g Intravenous Q24H    docusate sodium  100 mg Oral BID    gabapentin  600 mg Oral TID    metoclopramide  5 mg Oral 4x Daily    oxybutynin  5 mg Oral Daily    sertraline  50 mg Oral Daily    sodium chloride flush  10 mL Intravenous 2 times per day    ipratropium-albuterol  1 ampule Inhalation 4x daily    fentaNYL  1 patch Transdermal Q72H     PRN Meds: acetaminophen, methadone, sodium chloride flush, magnesium hydroxide, ondansetron, ipratropium-albuterol, guaiFENesin-dextromethorphan, analgesic ointment, albuterol, morphine, HYDROcodone 5 mg - acetaminophen      Intake/Output Summary (Last 24 hours) at 5/25/2019 0901  Last data filed at 5/24/2019 1402  Gross per 24 hour   Intake 360 ml   Output --   Net 360 ml       Exam:    /71   Pulse 78   Temp 98 °F (36.7 °C) (Temporal)   Resp 18   Ht 5' 3\" (1.6 m)   Wt 97 lb 9.6 oz (44.3 kg)   SpO2 98%   BMI 17.29 kg/m²     General appearance:   middle-age female, chronically ill appearing and weak, in moderate respiratory distress, appears stated age and cooperative. Diaphoretic. Afebrile. HEENT:  Normal cephalic, atraumatic without obvious deformity. Pupils equal, round, and reactive to light. Extra ocular muscles intact. Conjunctivae/corneas clear. Neck: Supple, with full range of motion. jugular venous distention. Trachea midline. Respiratory: Increased work of breathing. diffuse Rales/Wheezes/Rhonchi throughout the lungs   Cardiovascular:  Regular rate and rhythm with normal S1/S2 without murmurs, rubs or gallops. Abdomen:Full, firm, diffusely tender  with normal bowel sounds. Right nephrostomy catheter in place. Musculoskeletal:  No clubbing/cyanosis, 2+ edema right leg. limited  range of motion without deformity. Skin: Skin color, texture, turgor normal.  No rashes or lesions. Neurologic:  Neurovascularly intact without any focal sensory/motor deficits.  Cranial nerves: II-XII intact, grossly non-focal.  Psychiatric:  Alert and oriented, thought content appropriate, normal insight  Capillary Refill: Brisk,< 3 seconds   Peripheral Pulses: +2 palpable, equal bilaterally           Labs:   Recent Labs     05/23/19  0624 05/24/19  0700 05/25/19  0730   WBC 5.5 8.5 7.0   HGB 10.3* 9.8* 11.6   HCT 32.7* 30.2* 37.6    318 304     Recent Labs     05/23/19  0624 05/24/19  0700 05/25/19  0731    135 133   K 5.2*  5.1* 4.7 5.1*    96* 95*   CO2 23 23 24   BUN 18 27* 27*   CREATININE 1.4* 1.3* 1.3*   CALCIUM 8.3* 8.5* 8.6     Recent Labs     05/22/19  1620   AST 21   ALT 8   BILITOT <0.2   ALKPHOS 93     Recent Labs     05/23/19  0624   INR 1.1     Recent Labs     05/22/19  1620   TROPONINI 0.18*       Imaging:  US DUP LOWER EXTREMITIES BILATERAL VENOUS   Final Result   No evidence for deep venous thrombosis               CTA CHEST W CONTRAST   Final Result   A large lobulated mass in the left hilum abutting the hilar   structures, the aorta and invading the left main pulmonary artery and   partially occluding the left upper lobe pulmonary artery and pulmonary   vein on the left side. Findings are concerning for malignancy either   primary or metastasis. There is possible metastatic involvement in the   right and left lungs,  adrenal metastasis and possibly liver   metastasis. No other intrinsic pulmonary embolism or identified. The findings were telephoned to the ED physician. Kenyan Justice ALERT:  CRITICAL RESULT                        CT ABDOMEN PELVIS W IV CONTRAST Additional Contrast? None   Final Result      1. THICK WALLED ENHANCING BLADDER WHICH COULD REFLECT CYSTITIS,   CORRELATION WITH URINALYSIS IS RECOMMENDED. 2.  INTERVAL INCREASE IN SIZE OF BILATERAL ADRENAL METASTASES WITH   INTERVAL DEVELOPMENT OF CENTRAL NECROSIS. 3.  INTERVAL ENLARGEMENT OF RETROPERITONEAL AND MESENTERIC LYMPH NODES   AS DESCRIBED. 4.  INDETERMINATE LOW-ATTENUATION LESION IN THE LIVER, IS UNCHANGED   FROM 12/15/2018. 5. MILD PROMINENCE OF THE INTRAHEPATIC BILIARY TREE WITHOUT FOCAL   OBSTRUCTION OR DILATION OF THE EXTRAHEPATIC BILIARY TREE, LIKELY   PHYSIOLOGIC. 6.  NODULAR OPACITIES IN THE LEFT LOWER LOBE IN A TREE-IN-BUD   DISTRIBUTION SUGGESTING INFECTIOUS BRONCHIOLITIS, THESE APPEAR TO BE   NEW FROM 4/29/2019. FOLLOW-UP IS RECOMMENDED TO ENSURE RESOLUTION. 7.  BILATERAL SACRAL INSUFFICIENCY FRACTURES      XR CHEST PORTABLE   Final Result      No airspace opacities or pleural effusion.                   Assessment/Plan:  Active Hospital Problems    Diagnosis Date Noted    Severe malnutrition ([address] [E43] 05/23/2019    Left leg swelling [M79.89] 05/23/2019    Stage 3 chronic kidney disease ([address] [N18.3] 05/23/2019    Acute respiratory failure (HCC) [J96.00] 05/23/2019    PNA (pneumonia) [J18.9] 05/23/2019    Mucus plugging of bronchi [J98.09] 05/23/2019    Severe protein-calorie malnutrition ([address] [E43] 05/23/2019    COPD exacerbation ([address] [J44.1] 05/22/2019    Lung mass [R91.8] 05/22/2019    Mediastinal lymphadenopathy [R59.0] 05/22/2019    Recurrent UTI [N39.0] 05/22/2019  Elevated troponin [R74.8] 05/22/2019    Mass of both adrenal glands ([facility] [E27.9] 05/22/2019    Goals of care, counseling/discussion [Z71.89]     Pain, neoplasm-related [G89.3]     Hematuria [R31.9] 11/10/2018    Malignant neoplasm of exocervix ([facility] [C53.1] 02/14/2017     1. Acute respiratory failure with hypoxia and new oxygen requirement. Etiology is multifactorial in this patient. She has metastatic disease in her lungs surrounding the left main bronchus, COPD exacerbation, right lower lobe mucus plugging and possible bronchiolitis left lower lung/pneumonia. Oxygen to keep sats above 92%. BiPAP as needed. Pulmonary input noted and unable to perform routine bronchoscopy   No antibiotics. 2 Acute COPD exacerbation, breathing treatment and steroids. - Will wean steriods    3. Metastatic cervical cancer. Worsening metastatic disease despite chemotherapy and radiation. Oncology input noted  Rad/onc input noted    4. Hyperkalemia - treat and trend    5. Mucus plugging right lower lobe. Pulmonary input noted. 6. Left leg swelling, Doppler ultrasound - negative for DVT. 7. Recurrent UTI, IV Rocephin. 8. Left lower lobe bronchiolitis/pneumonia, IV vancomycin and Rocephin. 9. Elevated troponin in the setting of renal insufficiency and respiratory failure. Type I versus type II non-STEMI. chest pain has resolved. 10. Chest pain due to multiple problems above. Likely pulmonary etiology. Continue pain medications. - resolved     11. Bilateral adrenal mass likely metastatic in etiology. 12. Hematuria likely secondary to ongoing intra-abdominal process, UTI and presence of nephrostomy tubes. Monitor hemoglobin. 13. Severe malnutrition, this is cancer related. dietary supplementation. 14. Stage III chronic kidney disease at baseline.     15. Tobacco dependence, smoking cessation.     DVT Prophylaxis: SCDs if negative doppler studies  Diet: No diet orders on file NPO  Code Status: Prior

## 2019-05-25 NOTE — PLAN OF CARE
Problem: Falls - Risk of:  Goal: Will remain free from falls  Description  Will remain free from falls  Outcome: Met This Shift     Problem: Falls - Risk of:  Goal: Absence of physical injury  Description  Absence of physical injury  Outcome: Met This Shift     Problem: Anxiety:  Goal: Level of anxiety will decrease  Description  Level of anxiety will decrease  Outcome: Met This Shift     Problem: Airway Clearance - Ineffective:  Goal: Ability to maintain a clear airway will improve  Description  Ability to maintain a clear airway will improve  Outcome: Met This Shift     Problem: Risk for Impaired Skin Integrity  Goal: Tissue integrity - skin and mucous membranes  Description  Structural intactness and normal physiological function of skin and  mucous membranes.   Outcome: Met This Shift     Problem: Pain:  Goal: Pain level will decrease  Description  Pain level will decrease  Outcome: Ongoing     Problem: Pain:  Goal: Control of acute pain  Description  Control of acute pain  Outcome: Ongoing     Problem: Pain:  Goal: Control of chronic pain  Description  Control of chronic pain  Outcome: Ongoing     Problem: Cardiac Output - Decreased:  Goal: Hemodynamic stability will improve  Description  Hemodynamic stability will improve  Outcome: Ongoing

## 2019-05-26 LAB
ANION GAP SERPL CALCULATED.3IONS-SCNC: 14 MMOL/L (ref 7–16)
ANISOCYTOSIS: ABNORMAL
BASOPHILS ABSOLUTE: 0.01 E9/L (ref 0–0.2)
BASOPHILS RELATIVE PERCENT: 0.1 % (ref 0–2)
BUN BLDV-MCNC: 28 MG/DL (ref 8–23)
BURR CELLS: ABNORMAL
CALCIUM SERPL-MCNC: 8.8 MG/DL (ref 8.6–10.2)
CHLORIDE BLD-SCNC: 97 MMOL/L (ref 98–107)
CO2: 24 MMOL/L (ref 22–29)
CREAT SERPL-MCNC: 1.3 MG/DL (ref 0.5–1)
EOSINOPHILS ABSOLUTE: 0.01 E9/L (ref 0.05–0.5)
EOSINOPHILS RELATIVE PERCENT: 0.1 % (ref 0–6)
GFR AFRICAN AMERICAN: 50
GFR NON-AFRICAN AMERICAN: 42 ML/MIN/1.73
GLUCOSE BLD-MCNC: 112 MG/DL (ref 74–99)
HCT VFR BLD CALC: 32.8 % (ref 34–48)
HEMOGLOBIN: 10.1 G/DL (ref 11.5–15.5)
HYPOCHROMIA: ABNORMAL
IMMATURE GRANULOCYTES #: 0.05 E9/L
IMMATURE GRANULOCYTES %: 0.6 % (ref 0–5)
LYMPHOCYTES ABSOLUTE: 0.34 E9/L (ref 1.5–4)
LYMPHOCYTES RELATIVE PERCENT: 4.3 % (ref 20–42)
MCH RBC QN AUTO: 29.4 PG (ref 26–35)
MCHC RBC AUTO-ENTMCNC: 30.8 % (ref 32–34.5)
MCV RBC AUTO: 95.3 FL (ref 80–99.9)
MONOCYTES ABSOLUTE: 0.56 E9/L (ref 0.1–0.95)
MONOCYTES RELATIVE PERCENT: 7.1 % (ref 2–12)
NEUTROPHILS ABSOLUTE: 6.92 E9/L (ref 1.8–7.3)
NEUTROPHILS RELATIVE PERCENT: 87.8 % (ref 43–80)
PDW BLD-RTO: 15.9 FL (ref 11.5–15)
PLATELET # BLD: 299 E9/L (ref 130–450)
PMV BLD AUTO: 9.2 FL (ref 7–12)
POIKILOCYTES: ABNORMAL
POLYCHROMASIA: ABNORMAL
POTASSIUM REFLEX MAGNESIUM: 5.3 MMOL/L (ref 3.5–5)
RBC # BLD: 3.44 E12/L (ref 3.5–5.5)
SCHISTOCYTES: ABNORMAL
SODIUM BLD-SCNC: 135 MMOL/L (ref 132–146)
WBC # BLD: 7.9 E9/L (ref 4.5–11.5)

## 2019-05-26 PROCEDURE — 6360000002 HC RX W HCPCS: Performed by: STUDENT IN AN ORGANIZED HEALTH CARE EDUCATION/TRAINING PROGRAM

## 2019-05-26 PROCEDURE — 94640 AIRWAY INHALATION TREATMENT: CPT

## 2019-05-26 PROCEDURE — 6370000000 HC RX 637 (ALT 250 FOR IP): Performed by: INTERNAL MEDICINE

## 2019-05-26 PROCEDURE — 85025 COMPLETE CBC W/AUTO DIFF WBC: CPT

## 2019-05-26 PROCEDURE — 6360000002 HC RX W HCPCS: Performed by: FAMILY MEDICINE

## 2019-05-26 PROCEDURE — 6370000000 HC RX 637 (ALT 250 FOR IP): Performed by: FAMILY MEDICINE

## 2019-05-26 PROCEDURE — 6370000000 HC RX 637 (ALT 250 FOR IP): Performed by: STUDENT IN AN ORGANIZED HEALTH CARE EDUCATION/TRAINING PROGRAM

## 2019-05-26 PROCEDURE — 36415 COLL VENOUS BLD VENIPUNCTURE: CPT

## 2019-05-26 PROCEDURE — 2580000003 HC RX 258: Performed by: FAMILY MEDICINE

## 2019-05-26 PROCEDURE — 99233 SBSQ HOSP IP/OBS HIGH 50: CPT | Performed by: INTERNAL MEDICINE

## 2019-05-26 PROCEDURE — 80048 BASIC METABOLIC PNL TOTAL CA: CPT

## 2019-05-26 PROCEDURE — 94660 CPAP INITIATION&MGMT: CPT

## 2019-05-26 PROCEDURE — 1200000000 HC SEMI PRIVATE

## 2019-05-26 RX ORDER — METHYLPREDNISOLONE SODIUM SUCCINATE 40 MG/ML
20 INJECTION, POWDER, LYOPHILIZED, FOR SOLUTION INTRAMUSCULAR; INTRAVENOUS DAILY
Status: DISCONTINUED | OUTPATIENT
Start: 2019-05-27 | End: 2019-05-27 | Stop reason: HOSPADM

## 2019-05-26 RX ORDER — SODIUM POLYSTYRENE SULFONATE 15 G/60ML
15 SUSPENSION ORAL; RECTAL ONCE
Status: COMPLETED | OUTPATIENT
Start: 2019-05-26 | End: 2019-05-26

## 2019-05-26 RX ORDER — PROMETHAZINE HYDROCHLORIDE 25 MG/ML
12.5 INJECTION, SOLUTION INTRAMUSCULAR; INTRAVENOUS EVERY 6 HOURS PRN
Status: DISCONTINUED | OUTPATIENT
Start: 2019-05-26 | End: 2019-05-27 | Stop reason: HOSPADM

## 2019-05-26 RX ADMIN — METHYLPREDNISOLONE SODIUM SUCCINATE 20 MG: 40 INJECTION, POWDER, LYOPHILIZED, FOR SOLUTION INTRAMUSCULAR; INTRAVENOUS at 09:33

## 2019-05-26 RX ADMIN — MORPHINE SULFATE 2 MG: 2 INJECTION, SOLUTION INTRAMUSCULAR; INTRAVENOUS at 11:56

## 2019-05-26 RX ADMIN — OXYBUTYNIN CHLORIDE 5 MG: 5 TABLET ORAL at 09:34

## 2019-05-26 RX ADMIN — GABAPENTIN 600 MG: 600 TABLET, FILM COATED ORAL at 13:45

## 2019-05-26 RX ADMIN — SODIUM POLYSTYRENE SULFONATE 15 G: 15 SUSPENSION ORAL; RECTAL at 11:34

## 2019-05-26 RX ADMIN — Medication 10 ML: at 02:12

## 2019-05-26 RX ADMIN — SERTRALINE 50 MG: 50 TABLET, FILM COATED ORAL at 09:34

## 2019-05-26 RX ADMIN — IPRATROPIUM BROMIDE AND ALBUTEROL SULFATE 1 AMPULE: .5; 3 SOLUTION RESPIRATORY (INHALATION) at 10:38

## 2019-05-26 RX ADMIN — Medication 10 ML: at 09:34

## 2019-05-26 RX ADMIN — MORPHINE SULFATE 2 MG: 2 INJECTION, SOLUTION INTRAMUSCULAR; INTRAVENOUS at 07:47

## 2019-05-26 RX ADMIN — Medication 10 ML: at 21:08

## 2019-05-26 RX ADMIN — HYDROCODONE BITARTRATE AND ACETAMINOPHEN 1 TABLET: 10; 325 TABLET ORAL at 04:14

## 2019-05-26 RX ADMIN — IPRATROPIUM BROMIDE AND ALBUTEROL SULFATE 1 AMPULE: .5; 3 SOLUTION RESPIRATORY (INHALATION) at 20:48

## 2019-05-26 RX ADMIN — METOCLOPRAMIDE 5 MG: 5 TABLET ORAL at 09:34

## 2019-05-26 RX ADMIN — METOCLOPRAMIDE 5 MG: 5 TABLET ORAL at 13:47

## 2019-05-26 RX ADMIN — METOCLOPRAMIDE 5 MG: 5 TABLET ORAL at 21:08

## 2019-05-26 RX ADMIN — ONDANSETRON HYDROCHLORIDE 4 MG: 2 SOLUTION INTRAMUSCULAR; INTRAVENOUS at 13:45

## 2019-05-26 RX ADMIN — MORPHINE SULFATE 2 MG: 2 INJECTION, SOLUTION INTRAMUSCULAR; INTRAVENOUS at 02:12

## 2019-05-26 RX ADMIN — GABAPENTIN 600 MG: 600 TABLET, FILM COATED ORAL at 21:08

## 2019-05-26 RX ADMIN — IPRATROPIUM BROMIDE AND ALBUTEROL SULFATE 1 AMPULE: .5; 3 SOLUTION RESPIRATORY (INHALATION) at 17:00

## 2019-05-26 RX ADMIN — GABAPENTIN 600 MG: 600 TABLET, FILM COATED ORAL at 09:34

## 2019-05-26 RX ADMIN — CEFTRIAXONE SODIUM 1 G: 1 INJECTION, POWDER, FOR SOLUTION INTRAMUSCULAR; INTRAVENOUS at 21:08

## 2019-05-26 RX ADMIN — METOCLOPRAMIDE 5 MG: 5 TABLET ORAL at 17:20

## 2019-05-26 RX ADMIN — HYDROCODONE BITARTRATE AND ACETAMINOPHEN 1 TABLET: 10; 325 TABLET ORAL at 20:05

## 2019-05-26 RX ADMIN — MORPHINE SULFATE 2 MG: 2 INJECTION, SOLUTION INTRAMUSCULAR; INTRAVENOUS at 16:30

## 2019-05-26 RX ADMIN — PROMETHAZINE HYDROCHLORIDE 12.5 MG: 25 INJECTION INTRAMUSCULAR; INTRAVENOUS at 17:21

## 2019-05-26 RX ADMIN — DOCUSATE SODIUM 100 MG: 100 CAPSULE, LIQUID FILLED ORAL at 21:08

## 2019-05-26 ASSESSMENT — PAIN DESCRIPTION - PAIN TYPE
TYPE: CHRONIC PAIN

## 2019-05-26 ASSESSMENT — PAIN DESCRIPTION - ORIENTATION
ORIENTATION: LEFT

## 2019-05-26 ASSESSMENT — PAIN SCALES - GENERAL
PAINLEVEL_OUTOF10: 10
PAINLEVEL_OUTOF10: 5
PAINLEVEL_OUTOF10: 8
PAINLEVEL_OUTOF10: 10
PAINLEVEL_OUTOF10: 8

## 2019-05-26 ASSESSMENT — PAIN DESCRIPTION - ONSET
ONSET: ON-GOING

## 2019-05-26 ASSESSMENT — PAIN DESCRIPTION - PROGRESSION
CLINICAL_PROGRESSION: NOT CHANGED

## 2019-05-26 ASSESSMENT — PAIN DESCRIPTION - LOCATION
LOCATION: ABDOMEN;GENERALIZED

## 2019-05-26 ASSESSMENT — PAIN DESCRIPTION - DESCRIPTORS
DESCRIPTORS: ACHING;CONSTANT;DISCOMFORT

## 2019-05-26 ASSESSMENT — PAIN - FUNCTIONAL ASSESSMENT
PAIN_FUNCTIONAL_ASSESSMENT: PREVENTS OR INTERFERES SOME ACTIVE ACTIVITIES AND ADLS

## 2019-05-26 ASSESSMENT — PAIN DESCRIPTION - FREQUENCY
FREQUENCY: CONTINUOUS

## 2019-05-26 NOTE — PROGRESS NOTES
respiratory distress, appears stated age and cooperative. Diaphoretic. Afebrile. HEENT:  Normal cephalic, atraumatic without obvious deformity. Pupils equal, round, and reactive to light. Extra ocular muscles intact. Conjunctivae/corneas clear. Neck: Supple, with full range of motion. jugular venous distention. Trachea midline. Respiratory: coarse breath sounds  Cardiovascular:  Regular rate and rhythm with normal S1/S2 without murmurs, rubs or gallops. Abdomen:Full, firm, diffusely tender  with normal bowel sounds. Right nephrostomy catheter in place. Musculoskeletal:  No clubbing/cyanosis, 2+ edema right leg. limited  range of motion without deformity. Skin: Skin color, texture, turgor normal.  No rashes or lesions. Neurologic:  Neurovascularly intact without any focal sensory/motor deficits. Cranial nerves: II-XII intact, grossly non-focal.  Psychiatric:  Alert and oriented, thought content appropriate, normal insight  Capillary Refill: Brisk,< 3 seconds   Peripheral Pulses: +2 palpable, equal bilaterally           Labs:   Recent Labs     05/24/19  0700 05/25/19  0730 05/26/19  0629   WBC 8.5 7.0 7.9   HGB 9.8* 11.6 10.1*   HCT 30.2* 37.6 32.8*    304 299     Recent Labs     05/24/19  0700 05/25/19  0731 05/26/19  0629    133 135   K 4.7 5.1* 5.3*   CL 96* 95* 97*   CO2 23 24 24   BUN 27* 27* 28*   CREATININE 1.3* 1.3* 1.3*   CALCIUM 8.5* 8.6 8.8     No results for input(s): AST, ALT, BILIDIR, BILITOT, ALKPHOS in the last 72 hours. No results for input(s): INR in the last 72 hours. No results for input(s): [place]es Specking in the last 72 hours.     Imaging:  US DUP LOWER EXTREMITIES BILATERAL VENOUS   Final Result   No evidence for deep venous thrombosis               CTA CHEST W CONTRAST   Final Result   A large lobulated mass in the left hilum abutting the hilar   structures, the aorta and invading the left main pulmonary artery and   partially occluding the left upper lobe pulmonary artery and pulmonary   vein on the left side. Findings are concerning for malignancy either   primary or metastasis. There is possible metastatic involvement in the   right and left lungs,  adrenal metastasis and possibly liver   metastasis. No other intrinsic pulmonary embolism or identified. The findings were telephoned to the ED physician. Kaley Hess ALERT:  CRITICAL RESULT                        CT ABDOMEN PELVIS W IV CONTRAST Additional Contrast? None   Final Result      1. THICK WALLED ENHANCING BLADDER WHICH COULD REFLECT CYSTITIS,   CORRELATION WITH URINALYSIS IS RECOMMENDED. 2.  INTERVAL INCREASE IN SIZE OF BILATERAL ADRENAL METASTASES WITH   INTERVAL DEVELOPMENT OF CENTRAL NECROSIS. 3.  INTERVAL ENLARGEMENT OF RETROPERITONEAL AND MESENTERIC LYMPH NODES   AS DESCRIBED. 4.  INDETERMINATE LOW-ATTENUATION LESION IN THE LIVER, IS UNCHANGED   FROM 12/15/2018. 5. MILD PROMINENCE OF THE INTRAHEPATIC BILIARY TREE WITHOUT FOCAL   OBSTRUCTION OR DILATION OF THE EXTRAHEPATIC BILIARY TREE, LIKELY   PHYSIOLOGIC. 6.  NODULAR OPACITIES IN THE LEFT LOWER LOBE IN A TREE-IN-BUD   DISTRIBUTION SUGGESTING INFECTIOUS BRONCHIOLITIS, THESE APPEAR TO BE   NEW FROM 4/29/2019. FOLLOW-UP IS RECOMMENDED TO ENSURE RESOLUTION. 7.  BILATERAL SACRAL INSUFFICIENCY FRACTURES      XR CHEST PORTABLE   Final Result      No airspace opacities or pleural effusion.                   Assessment/Plan:  Active Hospital Problems    Diagnosis Date Noted    Severe malnutrition ([address] [E43] 05/23/2019    Left leg swelling [M79.89] 05/23/2019    Stage 3 chronic kidney disease ([address] [N18.3] 05/23/2019    Acute respiratory failure (HCC) [J96.00] 05/23/2019    PNA (pneumonia) [J18.9] 05/23/2019    Mucus plugging of bronchi [J98.09] 05/23/2019    Severe protein-calorie malnutrition ([address] [E43] 05/23/2019    COPD exacerbation ([address] [J44.1] 05/22/2019    Lung mass [R91.8] 05/22/2019    Mediastinal lymphadenopathy [R59.0] 05/22/2019    Recurrent UTI [N39.0] 05/22/2019    Elevated troponin [R74.8] 05/22/2019    Mass of both adrenal glands ([facility] [address] [E27.9] 05/22/2019    Goals of care, counseling/discussion [Z71.89]     Pain, neoplasm-related [G89.3]     Hematuria [R31.9] 11/10/2018    Malignant neoplasm of exocervix ([facility] [address] [C53.1] 02/14/2017     1. Acute respiratory failure with hypoxia and new oxygen requirement. Etiology is multifactorial in this patient. She has metastatic disease in her lungs surrounding the left main bronchus, COPD exacerbation, right lower lobe mucus plugging and possible bronchiolitis left lower lung/pneumonia. Oxygen to keep sats above 92%. BiPAP as needed. Pulmonary input noted and unable to perform routine bronchoscopy   No antibiotics. Steroids tapered to BID      2 Acute COPD exacerbation, breathing treatment and steroids. - Will wean steriods    3. Metastatic cervical cancer. Worsening metastatic disease despite chemotherapy and radiation. Oncology input noted  Rad/onc input noted    4. Hyperkalemia - treat and trend    5. Mucus plugging right lower lobe. Pulmonary input noted. 6. Left leg swelling, Doppler ultrasound - negative for DVT. 7. Recurrent UTI, IV Rocephin. 8. Left lower lobe bronchiolitis/pneumonia, IV vancomycin and Rocephin. 9. Elevated troponin in the setting of renal insufficiency and respiratory failure. Type I versus type II non-STEMI. chest pain has resolved. 10. Chest pain due to multiple problems above. Likely pulmonary etiology. Continue pain medications. - resolved     11. Bilateral adrenal mass likely metastatic in etiology. 12. Hematuria likely secondary to ongoing intra-abdominal process, UTI and presence of nephrostomy tubes. Monitor hemoglobin. 13. Severe malnutrition, this is cancer related. dietary supplementation. 14. Stage III chronic kidney disease at baseline.     15. Tobacco dependence, smoking cessation.     DVT Prophylaxis: SCDs if negative doppler

## 2019-05-26 NOTE — PLAN OF CARE
Problem: Falls - Risk of:  Goal: Will remain free from falls  Description  Will remain free from falls  Outcome: Met This Shift     Problem: Falls - Risk of:  Goal: Absence of physical injury  Description  Absence of physical injury  Outcome: Met This Shift     Problem: Pain:  Goal: Pain level will decrease  Description  Pain level will decrease  Outcome: Met This Shift     Problem: Pain:  Goal: Control of acute pain  Description  Control of acute pain  Outcome: Met This Shift     Problem: Pain:  Goal: Control of chronic pain  Description  Control of chronic pain  Outcome: Met This Shift     Problem: Anxiety:  Goal: Level of anxiety will decrease  Description  Level of anxiety will decrease  Outcome: Met This Shift     Problem: Airway Clearance - Ineffective:  Goal: Ability to maintain a clear airway will improve  Description  Ability to maintain a clear airway will improve  Outcome: Met This Shift

## 2019-05-26 NOTE — PROGRESS NOTES
Date: 5/25/2019    Time: 10:02 PM    Patient Placed On BIPAP/CPAP/ Non-Invasive Ventilation? Yes    If no must comment. Facial area red/color change? No           If YES are Blister/Lesion present? No   If yes must notify nursing staff  BIPAP/CPAP skin barrier? Yes    Skin barrier type:Liquicel       Comments:  Applied bipap for HS.  Patient tolerating well and fell back to sleep while placing it on her       Flordia Devendra

## 2019-05-26 NOTE — PROGRESS NOTES
[facility]  Department of Pulmonary, Critical Care and Sleep Medicine  Progess Note    Patient: Liam Santamaria  MRN: [id number]  : 1960    Encounter Time: 12:33 PM     Date of Admission: 2019  3:30 PM    Primary Care Physician: Roberta Varela MD    Reason for Consultation: COPD and lung mass     SUBJECTIVE:    XRT done  Urology notes reviewed  She is breathing better        OBJECTIVE:     PHYSICAL EXAM:   VITALS:     Intake/Output Summary (Last 24 hours) at 2019 1233  Last data filed at 2019 1137  Gross per 24 hour   Intake 1080 ml   Output 1020 ml   Net 60 ml        CONSTITUTIONAL:    A&O x 3,   SKIN:     No rash,  HEENT:     EOMI,   NECK:    No bruits, No JVP apprechiated  CV:      Sinus,  No murmus, No Rubs, No gallops  PULMONARY:   Aysmeteric Couse BS,  No Wheezing, No Rales, No Rhonchi  ABDOMEN:     Soft, non-tender. BS normal. No R/R/G  EXT:    No deformities . No lower extremity edema,   PULSE:   Appears equal and palpable.   PSYCHIATRIC:  Seems appropriate, No acute psycosis, scared  MS:    No fractures, No gross weakness  NEUROLOGIC:   The exam is non-focal     DATA: IMAGING & TESTING:     LABORATORY TESTS:    CBC:   Lab Results   Component Value Date    WBC 7.9 2019    RBC 3.44 2019    HGB 10.1 2019    HCT 32.8 2019    MCV 95.3 2019    MCH 29.4 2019    MCHC 30.8 2019    RDW 15.9 2019     2019    MPV 9.2 2019     CMP:    Lab Results   Component Value Date     2019    K 5.3 2019    CL 97 2019    CO2 24 2019    BUN 28 2019    CREATININE 1.3 2019    GFRAA 50 2019    LABGLOM 42 2019    GLUCOSE 112 2019    PROT 7.5 2019    LABALBU 3.4 2019    CALCIUM 8.8 2019    BILITOT <0.2 2019    ALKPHOS 93 2019    AST 21 2019    ALT 8 2019        PRO-BNP:   Lab Results   Component Value Date PROBNP 3,660 (H) 05/23/2019      ABGs:   Lab Results   Component Value Date    PH 7.394 05/23/2019    PO2 49.6 05/23/2019    PCO2 37.2 05/23/2019     Hemoglobin A1C: No components found for: HGBA1C    IMAGING:  Imaging tests were completed and reviewed and discussed radiology and care team involved and reveals   Ct Abdomen Pelvis Wo Contrast    Addendum Date: 5/9/2019    Reviewed the previous CT abdomen and pelvis dated of April 29, 2019. Also reviewed the previous PET nuclear medicine scan study dated May 30 and October 8, 2018 and previous CAT scan examinations of the abdomen and pelvis of January 11, 2017, November 10, 2018, December 15, 2018. Patient has history of cervical malignancy. Ureter stents have been applied to decompress the distal ureters. I could not identified a conspicuously mesenteric mass or bulky adenopathy. As reported on the study of April 2019, 2018 there is a mass lesion in the region of the left adrenal gland which size has increased since the previous examination of December 15. And also there is a now a fullness or mild enlargement of the right adrenal gland which was not seen previously. Some adenopathy in the upper periaortic region that was seen on the PET nuclear medicine bone scan of 2018, cannot be conspicuously identified at the around of the right diaphragma crura or in the region of the celiac axis/mesenteric root. There is a lymph node in the left paraortic region which size also has increased since the study of December 15, presently measures 13 x 11 mm are. These findings are supportive for metastatic disease to the retroperitoneal region. Adrenals lesions can be on the basis of metastasis or can be large retroperitoneal bulky adenopathy obscuring the anatomy of the left adrenal.  The right adrenal gland had enlarged since the previous CT of December 2018.  The study of April 29  was done without IV contrast. If the patient cannot have IV contrast, MRI study can be helpful to better display the upper retroperitoneal structures. Also a repeated PET nuclear medicine scan can be helpful for monitoring the abnormal lymph nodes that were observed on the previous examinations. Result Date: 2019  Patient MRN:  [id number] : 1960 Age: 59 years Gender: Female Order Date:  2019 6:00 PM TECHNIQUE/NUMBER OF IMAGES/COMPARISON/CLINICAL HISTORY: CT abdomen and pelvis no contrast Axial images were obtained with sagittal and coronal reconstructions are. Comparison study December 15, 2018. CLINICAL HISTORY: 8year-old the female patient with the abdominal pain. Images: 209. FINDINGS: This has a catheter draining the left ureter. The catheter is in proper position. There is a percutaneous catheter draining the right kidney. The catheter is in proper position. There are some fullness of the collecting system of both kidneys but also kidneys or in the compressed by the catheters. There is a large mass lesion in the left adrenal gland which has increased size since the previous examination presently measures 4.5 x 2.8 cm previously it measured 2.6 x 2.2 cm there are. There is also enlargement of the right adrenal gland more prominent since the previous study. Also as observed previously there is midline retroperitoneal adenopathy with several enlarged lymph nodes around the aorta and IVC above and below the hyoid the kidneys. The liver has normal size and. The liver has a diffuse increased density up to 75 Hounsfield units are. This can be manifestation of a deposit disease as seen with hemachromatosis. Please correlate clinically. Gallbladder is nondistended. The intrahepatic biliary tree does not appears to be dilated. The pancreas is more difficult to be identified in the bladder appears otherwise unremarkable. The spleen has normal size. There is a pattern of constipation with foramina multiple fecal content throughout the entire colon.  Some degree of edema is seen in the retroperitoneal space particularly the presacral area. Minimal ascites seen in the abdomen particularly just underneath of the liver and spleen and also a small amount in the pelvic area. Aorta demonstrates some mild atherosclerotic changes. No free intraperitoneal air is seen. Lower lung bases demonstrate no significant findings are. Patient has a previous pinning for a now old fracture of the right femoral head. The bones otherwise appear unremarkable. 1. Left ureteric catheters are in proper position, draining the left kidney. 2. Catheter draining the left kidney in proper position. 3. Some degree of anasarca and discrete ascites. 4. Pattern of constipation. 5. Bilateral adrenal mass which can further increased size since previous examination of 2018. Metastasis to be considered. Please correlate clinically. 6. Midline retroperitoneal adenopathy. Ct Abdomen Pelvis W Iv Contrast Additional Contrast? None    Result Date: 2019  EXAMINATION:  CT ABDOMEN AND PELVIS WITH IV CONTRAST Patient MRN:  [id number] : 1960 Age: 59 years Gender: Female Order Date:  2019 5:30 PM EXAM: CT ABDOMEN PELVIS W IV CONTRAST NUMBER OF IMAGES \ views:  200 INDICATION:  Hx metasttic cancer new onset abdominal pain COMPARISON: 2019, 12/15/2018 TECHNIQUE: CT of the abdomen and pelvis was performed using standard technique, scanning from just above the dome of the diaphragm to the symphysis pubis. Contrast: IV:  110 ml of Isovue-370 CT Radiation dose: Integrated Dose-length product (DLP) for this visit =  237 mGy*cm. CT Dose Reduction Employed: Yes RESULT: Liver: 1.7 x 1.4 cm low-attenuation lesion in the anterior left hepatic lobe (series 3 image 14 is not significantly changed since 12/15/2018. No new hepatic lesion. Biliary: Mild prominence of the intrahepatic biliary ducts, no gallbladder dilation or extrahepatic biliary ductal dilation. No focal obstruction identified. Gallbladder is unremarkable. Spleen: No mass. No splenomegaly. Pancreas: No mass or duct dilation. Adrenals: Bilateral adrenal masses have increased in size in the interval. The left adrenal mass now measures 5.0 x 4.3 cm and contains a large area of central necrosis, previously measuring approximately 2.6 x 3.2 cm without significant necrosis. The right adrenal mass measures 4.3 x 1.8 cm, previously subcentimeter also with more central necrosis in the interval. Enhancing groin lymph nodes noted. Kidneys: Bilateral ureteral stents are in place. There is a 1 cm interpolar left renal cyst. Kidneys enhance symmetrically. GI tract: No dilation or wall thickening. Large volume of stool in the colon. Lymph nodes: Enlarged left periaortic lymph node measures 1.4 x 1.8 cm, this is increased in the interval. Additional left para-aortic lymph node measures 1.3 x 2.0 cm (series 3 image 33) this is also increased in size in the interval. Peripherally enhancing, essentially low-attenuation lesion along the left pelvic sidewall, likely partially necrotic node measures 1.4 x 3.4 cm (3:60) this was not well appreciated on the prior examination due to the lack of intravenous contrast. Mildly enlarged mesenteric lymph node measures 1 cm in short axis (3:36) Mesentery/Peritoneum: Mesenteric edema. No focal fluid collection. Retroperitoneum: Lymphadenopathy as described above. No focal Vasculature: The celiac axis and SMA are patent. The portal vein and branches, splenic vein, SMV, and hepatic veins are patent. Arterial atherosclerotic disease without aneurysm. Pelvis: No mass, ascites or fluid collection. The bladder is thick walled and there is mild mucosal enhancement. Double-J stents terminate in the bladder. Small amount of contrast layers dependently. Bones/Soft Tissues: Postsurgical changes of right hip reduction with 3 cannulated, partially threaded screws through the right femoral neck. There are bilateral sacral insufficiency fractures which are healing.  Lumbar spine is unremarkable. No destructive osseous lesion remote left-sided rib fractures. Lower thorax: Nodular opacities in the left lower lobe in a tree-in-bud distribution likely reflective of infectious bronchiolitis. Follow-up is recommended. 1.  THICK WALLED ENHANCING BLADDER WHICH COULD REFLECT CYSTITIS, CORRELATION WITH URINALYSIS IS RECOMMENDED. 2.  INTERVAL INCREASE IN SIZE OF BILATERAL ADRENAL METASTASES WITH INTERVAL DEVELOPMENT OF CENTRAL NECROSIS. 3.  INTERVAL ENLARGEMENT OF RETROPERITONEAL AND MESENTERIC LYMPH NODES AS DESCRIBED. 4.  INDETERMINATE LOW-ATTENUATION LESION IN THE LIVER, IS UNCHANGED FROM 12/15/2018. 5. MILD PROMINENCE OF THE INTRAHEPATIC BILIARY TREE WITHOUT FOCAL OBSTRUCTION OR DILATION OF THE EXTRAHEPATIC BILIARY TREE, LIKELY PHYSIOLOGIC. 6.  NODULAR OPACITIES IN THE LEFT LOWER LOBE IN A TREE-IN-BUD DISTRIBUTION SUGGESTING INFECTIOUS BRONCHIOLITIS, THESE APPEAR TO BE NEW FROM 2019. FOLLOW-UP IS RECOMMENDED TO ENSURE RESOLUTION. 7.  BILATERAL SACRAL INSUFFICIENCY FRACTURES    Cta Chest W Contrast    Result Date: 2019  Patient MRN:  [id number] : 1960 Age: 59 years Gender: Female Order Date:  2019 5:30 PM EXAM: CTA CHEST W CONTRAST number of images 406 including MIP coronal and sagittal reconstruction images. Contrast. Isovue-370, 110 mL intravenously. Technique: Low-dose CT  acquisition technique included one of following options; 1 . Automated exposure control, 2. Adjustment of MA and or KV according to patient's size or 3. Use of iterative reconstruction. INDICATION:  suspect PE  COMPARISON: None FINDINGS: The heart and the great vessels are unremarkable. The trachea and major bronchi are patent. No filling defects are identified in the main pulmonary artery and the central branches to suggest intrinsic pulmonary embolism.  A lobulated mass is identified in the left hilum measuring 8 x 4.5 x 3.5 cm extending to the left lung apex, abutting the aortic arch and surrounding medication  3. Discussed the procedure and risk, benefits, alternative - she agrees  4. Agree with steroids and bronchodilators   5. Spiritual support   6. Dietary supplements  7. Oncology to follow  8. She want to be part of the law suit for Sasha (Rahel Tena) - maybe oncology could provide info? 9.  Wean steroids to daily        Irvin Neal, MPH, [facility]  Professor of Medicine  Pulmonary, Critical Care and Sleep Medicine

## 2019-05-26 NOTE — CONSULTS
5/26/2019 7:30 AM  Service: Urology  Group: [facility] (Rj/Shira/Arnol)    Mago Abraham  [id number]     Chief Complaint: bilateral hydronephrosis    History of Present Illness: The patient is a 59 y.o. female patient who presents with acuter respiratory failure  The patient is know to Dr Erica Scott  She does have a HO cervical cancer  She does have mets  She does have bilateral hydronephrosis  She does have bilateral stents  She did have PNT's on the right   She does have some stent pain on the left      Past Medical History:   Diagnosis Date    COPD (chronic obstructive pulmonary disease) ([address]     Hyperlipidemia     Hypertension     PMB (postmenopausal bleeding)     Squamous cell carcinoma 02/06/2017    Invasive non-keratinizing squamous cell carcinoma with extensive squamous cell/severe dysplasia LAY 3       Past Surgical History:   Procedure Laterality Date    APPENDECTOMY      COLONOSCOPY N/A 3/13/2019    COLONOSCOPY WITH BIOPSY performed by Sarai Fishman MD at [address] [place] [address] Drive Right 3/4/2019    HIP OPEN REDUCTION INTERNAL FIXATION performed by Lazara Zepeda MD at [place] [address] Right     r hip surgery    LAPAROSCOPY      MOUTH SURGERY      NEPHROSTOMY Right     OTHER SURGICAL HISTORY  06/27/2017    Tandem and Ovoid insertion     OTHER SURGICAL HISTORY N/A 08/04/2017    TONSILLECTOMY      TONSILLECTOMY      URETER STENT PLACEMENT      kidney stents due to kidney failure       Medications Prior to Admission:    Medications Prior to Admission: ibuprofen (ADVIL;MOTRIN) 800 MG tablet, Take 1 tablet by mouth every 8 hours as needed for Pain  metoclopramide (REGLAN) 5 MG tablet, Take 5 mg by mouth 4 times daily  HYDROcodone-acetaminophen (NORCO) 5-325 MG per tablet, Take 1 tablet by mouth every 6 hours as needed for Pain.   amLODIPine (NORVASC) 5 MG tablet, Take 1 tablet by mouth daily  gabapentin (NEURONTIN) 600 MG tablet, Take 1 tablet by mouth 3 times daily. .  sertraline (ZOLOFT) 50 MG tablet, Take 50 mg by mouth daily  fentaNYL (DURAGESIC) 100 MCG/HR, Place 1 patch onto the skin every 72 hours. .  methadone (DOLOPHINE) 10 MG tablet, Take 10 mg by mouth every 8 hours as needed for Pain. Allayne Scarlett oxybutynin (DITROPAN) 5 MG tablet, Take 5 mg by mouth daily   docusate sodium (COLACE) 100 MG capsule, Take 100 mg by mouth 2 times daily  opium-belladonna (B&O SUPPRETTES) 16.2-60 MG suppository, Place 1 suppository rectally 2 times daily as needed for Pain for up to 7 days. .  [DISCONTINUED] acetaminophen (TYLENOL) 325 MG tablet, Take 650 mg by mouth every 6 hours as needed for Pain    Allergies:    Latex; Asa [aspirin]; Other; and Contrast [barium-containing compounds]    Social History:    reports that she has been smoking cigarettes. She has a 10.00 pack-year smoking history. She has never used smokeless tobacco. She reports that she has current or past drug history. Drug: Marijuana. She reports that she does not drink alcohol. Family History:   Non-contributory to this urological problem  family history is not on file. Review of Systems:  Respiratory: negative for cough and hemoptysis  Cardiovascular: negative for chest pain and dyspnea  Gastrointestinal: negative for abdominal pain, diarrhea, nausea and vomiting  Derm: negative for rash and skin lesion(s)  Neurological: negative for seizures and tremors  Endocrine: negative for diabetic symptoms including polydipsia and polyuria  : As above in the HPI, otherwise negative  All other reviews are negative    Physical Exam:   Vitals: /70   Pulse 97   Temp 97.8 °F (36.6 °C) (Temporal)   Resp 21   Ht 5' 3\" (1.6 m)   Wt 98 lb 8 oz (44.7 kg)   SpO2 96%   BMI 17.45 kg/m²   General:  Awake, alert, oriented X 3. Well developed, well nourished, well groomed. No apparent distress. HEENT:  Normocephalic, atraumatic. Pupils equal, round. No scleral icterus. No conjunctival injection. Normal lips, teeth, and gums. No nasal discharge. Neck:  Supple, no masses. Heart:  RRR  Lungs:  No audible wheezing. Respirations symmetric and non-labored. Abdomen:  soft, nontender, no masses, no organomegaly, no peritoneal signs  Extremities:  No clubbing, cyanosis, or edema  Skin:  Warm and dry, no open lesions or rashes  Neuro:  Cranial nerves 2-12 intact, no focal deficits  Rectal: deferred  Genitalia:  Estrella no    Labs:   Recent Labs     05/24/19  0700 05/25/19  0730   WBC 8.5 7.0   RBC 3.19* 3.90   HGB 9.8* 11.6   HCT 30.2* 37.6   MCV 94.7 96.4   MCH 30.7 29.7   MCHC 32.5 30.9*   RDW 15.3* 15.5*    304   MPV 9.3 9.2       Recent Labs     05/24/19  0700 05/25/19  0731   CREATININE 1.3* 1.3*     1.  THICK WALLED ENHANCING BLADDER WHICH COULD REFLECT CYSTITIS,   CORRELATION WITH URINALYSIS IS RECOMMENDED. 2.  INTERVAL INCREASE IN SIZE OF BILATERAL ADRENAL METASTASES WITH   INTERVAL DEVELOPMENT OF CENTRAL NECROSIS. 3.  INTERVAL ENLARGEMENT OF RETROPERITONEAL AND MESENTERIC LYMPH NODES   AS DESCRIBED. 4.  INDETERMINATE LOW-ATTENUATION LESION IN THE LIVER, IS UNCHANGED   FROM 12/15/2018. 5.  MILD PROMINENCE OF THE INTRAHEPATIC BILIARY TREE WITHOUT FOCAL   OBSTRUCTION OR DILATION OF THE EXTRAHEPATIC BILIARY TREE, LIKELY   PHYSIOLOGIC. 6.  NODULAR OPACITIES IN THE LEFT LOWER LOBE IN A TREE-IN-BUD   DISTRIBUTION SUGGESTING INFECTIOUS BRONCHIOLITIS, THESE APPEAR TO BE   NEW FROM 4/29/2019. FOLLOW-UP IS RECOMMENDED TO ENSURE RESOLUTION.    7.  BILATERAL SACRAL INSUFFICIENCY FRACTURES       Images:              Assessment: Soundra December 59 y.o. female     Bilateral hydronephrosis secondary to extrinsic compression of the mets from the cervical cancer  Right PNT  Bilateral adrenal mets  Left flank pain      Plan:    Cont the stents  Cont the right PNT  All options were discussed  Will need outpatient eval with Dr Merlinda Marine was reviewed  She does have good position of the stents  She does have good drainage of the right PNT  The pain is most likely from the spread of cancer  Agree with hospice eval  No additional  intervention at present  Call with questions    [facility]  Urology

## 2019-05-26 NOTE — PLAN OF CARE
Problem: Falls - Risk of:  Goal: Will remain free from falls  Description  Will remain free from falls  5/26/2019 1159 by Jl Ortega RN  Outcome: Met This Shift     Problem: Falls - Risk of:  Goal: Absence of physical injury  Description  Absence of physical injury  5/26/2019 1159 by Jl Ortega RN  Outcome: Met This Shift     Problem: Airway Clearance - Ineffective:  Goal: Ability to maintain a clear airway will improve  Description  Ability to maintain a clear airway will improve  5/26/2019 1159 by Jl Ortega RN  Outcome: Met This Shift     Problem: Risk for Impaired Skin Integrity  Goal: Tissue integrity - skin and mucous membranes  Description  Structural intactness and normal physiological function of skin and  mucous membranes.   Outcome: Met This Shift     Problem: Pain:  Goal: Pain level will decrease  Description  Pain level will decrease  5/26/2019 1159 by Jl Ortega RN  Outcome: Ongoing     Problem: Pain:  Goal: Control of acute pain  Description  Control of acute pain  5/26/2019 1159 by Jl Ortega RN  Outcome: Ongoing     Problem: Pain:  Goal: Control of chronic pain  Description  Control of chronic pain  5/26/2019 1159 by Jl Ortega RN  Outcome: Ongoing     Problem: Anxiety:  Goal: Level of anxiety will decrease  Description  Level of anxiety will decrease  5/26/2019 1159 by Jl Ortega RN  Outcome: Ongoing     Problem: Cardiac Output - Decreased:  Goal: Hemodynamic stability will improve  Description  Hemodynamic stability will improve  Outcome: Ongoing

## 2019-05-27 VITALS
RESPIRATION RATE: 18 BRPM | HEART RATE: 104 BPM | TEMPERATURE: 98.8 F | OXYGEN SATURATION: 92 % | WEIGHT: 97.6 LBS | HEIGHT: 63 IN | BODY MASS INDEX: 17.29 KG/M2 | SYSTOLIC BLOOD PRESSURE: 112 MMHG | DIASTOLIC BLOOD PRESSURE: 63 MMHG

## 2019-05-27 LAB
ANION GAP SERPL CALCULATED.3IONS-SCNC: 17 MMOL/L (ref 7–16)
ANISOCYTOSIS: ABNORMAL
BASOPHILS ABSOLUTE: 0.01 E9/L (ref 0–0.2)
BASOPHILS RELATIVE PERCENT: 0.2 % (ref 0–2)
BLOOD CULTURE, ROUTINE: NORMAL
BUN BLDV-MCNC: 27 MG/DL (ref 8–23)
CALCIUM SERPL-MCNC: 8.8 MG/DL (ref 8.6–10.2)
CHLORIDE BLD-SCNC: 98 MMOL/L (ref 98–107)
CO2: 21 MMOL/L (ref 22–29)
CREAT SERPL-MCNC: 1.2 MG/DL (ref 0.5–1)
EOSINOPHILS ABSOLUTE: 0.16 E9/L (ref 0.05–0.5)
EOSINOPHILS RELATIVE PERCENT: 3.7 % (ref 0–6)
GFR AFRICAN AMERICAN: 55
GFR NON-AFRICAN AMERICAN: 46 ML/MIN/1.73
GLUCOSE BLD-MCNC: 96 MG/DL (ref 74–99)
HCT VFR BLD CALC: 33.4 % (ref 34–48)
HEMOGLOBIN: 10.3 G/DL (ref 11.5–15.5)
IMMATURE GRANULOCYTES #: 0.04 E9/L
IMMATURE GRANULOCYTES %: 0.9 % (ref 0–5)
LYMPHOCYTES ABSOLUTE: 0.15 E9/L (ref 1.5–4)
LYMPHOCYTES RELATIVE PERCENT: 3.5 % (ref 20–42)
MCH RBC QN AUTO: 29.4 PG (ref 26–35)
MCHC RBC AUTO-ENTMCNC: 30.8 % (ref 32–34.5)
MCV RBC AUTO: 95.4 FL (ref 80–99.9)
MONOCYTES ABSOLUTE: 0.32 E9/L (ref 0.1–0.95)
MONOCYTES RELATIVE PERCENT: 7.5 % (ref 2–12)
NEUTROPHILS ABSOLUTE: 3.6 E9/L (ref 1.8–7.3)
NEUTROPHILS RELATIVE PERCENT: 84.2 % (ref 43–80)
OVALOCYTES: ABNORMAL
PDW BLD-RTO: 15.9 FL (ref 11.5–15)
PLATELET # BLD: 285 E9/L (ref 130–450)
PMV BLD AUTO: 9.5 FL (ref 7–12)
POTASSIUM REFLEX MAGNESIUM: 4.6 MMOL/L (ref 3.5–5)
RBC # BLD: 3.5 E12/L (ref 3.5–5.5)
SCHISTOCYTES: ABNORMAL
SODIUM BLD-SCNC: 136 MMOL/L (ref 132–146)
WBC # BLD: 4.3 E9/L (ref 4.5–11.5)

## 2019-05-27 PROCEDURE — 6360000002 HC RX W HCPCS: Performed by: INTERNAL MEDICINE

## 2019-05-27 PROCEDURE — 36415 COLL VENOUS BLD VENIPUNCTURE: CPT

## 2019-05-27 PROCEDURE — 6360000002 HC RX W HCPCS: Performed by: FAMILY MEDICINE

## 2019-05-27 PROCEDURE — 94640 AIRWAY INHALATION TREATMENT: CPT

## 2019-05-27 PROCEDURE — 6360000002 HC RX W HCPCS: Performed by: STUDENT IN AN ORGANIZED HEALTH CARE EDUCATION/TRAINING PROGRAM

## 2019-05-27 PROCEDURE — 6370000000 HC RX 637 (ALT 250 FOR IP): Performed by: FAMILY MEDICINE

## 2019-05-27 PROCEDURE — 2580000003 HC RX 258: Performed by: FAMILY MEDICINE

## 2019-05-27 PROCEDURE — 6370000000 HC RX 637 (ALT 250 FOR IP): Performed by: INTERNAL MEDICINE

## 2019-05-27 PROCEDURE — 94660 CPAP INITIATION&MGMT: CPT

## 2019-05-27 PROCEDURE — 80048 BASIC METABOLIC PNL TOTAL CA: CPT

## 2019-05-27 PROCEDURE — 85025 COMPLETE CBC W/AUTO DIFF WBC: CPT

## 2019-05-27 RX ORDER — HEPARIN SODIUM (PORCINE) LOCK FLUSH IV SOLN 100 UNIT/ML 100 UNIT/ML
100 SOLUTION INTRAVENOUS PRN
Status: DISCONTINUED | OUTPATIENT
Start: 2019-05-27 | End: 2019-05-27 | Stop reason: HOSPADM

## 2019-05-27 RX ADMIN — METOCLOPRAMIDE 5 MG: 5 TABLET ORAL at 09:01

## 2019-05-27 RX ADMIN — DOCUSATE SODIUM 100 MG: 100 CAPSULE, LIQUID FILLED ORAL at 09:01

## 2019-05-27 RX ADMIN — MORPHINE SULFATE 2 MG: 2 INJECTION, SOLUTION INTRAMUSCULAR; INTRAVENOUS at 06:25

## 2019-05-27 RX ADMIN — GABAPENTIN 600 MG: 600 TABLET, FILM COATED ORAL at 09:01

## 2019-05-27 RX ADMIN — GABAPENTIN 600 MG: 600 TABLET, FILM COATED ORAL at 13:14

## 2019-05-27 RX ADMIN — HEPARIN 100 UNITS: 100 SYRINGE at 14:24

## 2019-05-27 RX ADMIN — HYDROCODONE BITARTRATE AND ACETAMINOPHEN 1 TABLET: 10; 325 TABLET ORAL at 14:24

## 2019-05-27 RX ADMIN — MORPHINE SULFATE 2 MG: 2 INJECTION, SOLUTION INTRAMUSCULAR; INTRAVENOUS at 13:26

## 2019-05-27 RX ADMIN — SERTRALINE 50 MG: 50 TABLET, FILM COATED ORAL at 09:01

## 2019-05-27 RX ADMIN — OXYBUTYNIN CHLORIDE 5 MG: 5 TABLET ORAL at 09:01

## 2019-05-27 RX ADMIN — Medication 10 ML: at 06:25

## 2019-05-27 RX ADMIN — Medication 10 ML: at 09:01

## 2019-05-27 RX ADMIN — IPRATROPIUM BROMIDE AND ALBUTEROL SULFATE 1 AMPULE: .5; 3 SOLUTION RESPIRATORY (INHALATION) at 10:05

## 2019-05-27 RX ADMIN — METHYLPREDNISOLONE SODIUM SUCCINATE 20 MG: 40 INJECTION, POWDER, FOR SOLUTION INTRAMUSCULAR; INTRAVENOUS at 09:01

## 2019-05-27 RX ADMIN — METOCLOPRAMIDE 5 MG: 5 TABLET ORAL at 13:14

## 2019-05-27 ASSESSMENT — PAIN SCALES - GENERAL
PAINLEVEL_OUTOF10: 10
PAINLEVEL_OUTOF10: 10
PAINLEVEL_OUTOF10: 8
PAINLEVEL_OUTOF10: 10
PAINLEVEL_OUTOF10: 4
PAINLEVEL_OUTOF10: 10
PAINLEVEL_OUTOF10: 10

## 2019-05-27 ASSESSMENT — PAIN DESCRIPTION - LOCATION
LOCATION: ABDOMEN;GENERALIZED
LOCATION: ABDOMEN;GENERALIZED

## 2019-05-27 ASSESSMENT — PAIN DESCRIPTION - DESCRIPTORS
DESCRIPTORS: ACHING;CONSTANT;DISCOMFORT
DESCRIPTORS: ACHING;CONSTANT;DISCOMFORT

## 2019-05-27 ASSESSMENT — PAIN DESCRIPTION - FREQUENCY
FREQUENCY: CONTINUOUS
FREQUENCY: CONTINUOUS

## 2019-05-27 ASSESSMENT — PAIN DESCRIPTION - PAIN TYPE
TYPE: CHRONIC PAIN
TYPE: CHRONIC PAIN

## 2019-05-27 ASSESSMENT — PAIN DESCRIPTION - PROGRESSION
CLINICAL_PROGRESSION: NOT CHANGED
CLINICAL_PROGRESSION: NOT CHANGED

## 2019-05-27 ASSESSMENT — PAIN DESCRIPTION - ORIENTATION
ORIENTATION: LEFT
ORIENTATION: LEFT

## 2019-05-27 ASSESSMENT — PAIN DESCRIPTION - ONSET
ONSET: ON-GOING
ONSET: ON-GOING

## 2019-05-27 NOTE — PLAN OF CARE
Problem: Falls - Risk of:  Goal: Will remain free from falls  Description  Will remain free from falls  Outcome: Met This Shift     Problem: Falls - Risk of:  Goal: Absence of physical injury  Description  Absence of physical injury  Outcome: Met This Shift     Problem: Anxiety:  Goal: Level of anxiety will decrease  Description  Level of anxiety will decrease  Outcome: Met This Shift     Problem: Airway Clearance - Ineffective:  Goal: Ability to maintain a clear airway will improve  Description  Ability to maintain a clear airway will improve  Outcome: Met This Shift     Problem: Cardiac Output - Decreased:  Goal: Hemodynamic stability will improve  Description  Hemodynamic stability will improve  Outcome: Met This Shift     Problem: Risk for Impaired Skin Integrity  Goal: Tissue integrity - skin and mucous membranes  Description  Structural intactness and normal physiological function of skin and  mucous membranes.   Outcome: Met This Shift     Problem: Pain:  Goal: Pain level will decrease  Description  Pain level will decrease  Outcome: Ongoing     Problem: Pain:  Goal: Control of acute pain  Description  Control of acute pain  Outcome: Ongoing     Problem: Pain:  Goal: Control of chronic pain  Description  Control of chronic pain  Outcome: Ongoing

## 2019-05-27 NOTE — DISCHARGE SUMMARY
Hospital Medicine Discharge Summary    Patient ID: Sierra Varela      Patient's PCP: Monica Michaels MD    Admit Date: 5/22/2019     Discharge Date:   05/27/19    Admitting Physician: Adarsh Lamas MD     Discharge Physician: Cathy Acosta MD     Discharge Diagnoses: Active Hospital Problems    Diagnosis Date Noted    Severe malnutrition ([address] [E43] 05/23/2019    Left leg swelling [M79.89] 05/23/2019    Stage 3 chronic kidney disease ([address] [N18.3] 05/23/2019    Acute respiratory failure (HCC) [J96.00] 05/23/2019    PNA (pneumonia) [J18.9] 05/23/2019    Mucus plugging of bronchi [J98.09] 05/23/2019    Severe protein-calorie malnutrition ([address] [E43] 05/23/2019    COPD exacerbation ([address] [J44.1] 05/22/2019    Lung mass [R91.8] 05/22/2019    Mediastinal lymphadenopathy [R59.0] 05/22/2019    Recurrent UTI [N39.0] 05/22/2019    Elevated troponin [R74.8] 05/22/2019    Mass of both adrenal glands ([address] [E27.9] 05/22/2019    Goals of care, counseling/discussion [Z71.89]     Pain, neoplasm-related [G89.3]     Hematuria [R31.9] 11/10/2018    Malignant neoplasm of exocervix ([address] [C53.1] 02/14/2017       The patient was seen and examined on day of discharge and this discharge summary is in conjunction with any daily progress note from day of discharge. Admission HPI: 59 y.o. female who presented to [facility] with PMH of uterine cancer status post chemotherapy and radiation, last chemo treatment was last week, cancer related chronic pain, narcotic dependent, ongoing tobacco dependence, chronic kidney disease stage III, severe protein energy malnutrition and bilateral ureteric stents. Patient was just discharged from the hospital on 5/6/19 following a one-week stay. She had abdominal pain, migrating right nephrostomy tube that was replaced and complicated UTI. She is saw ID and was treated with Zosyn, urine culture was negative.  She came back tonight with shortness of breath that has been going on for the past 2 days. She also complains about chest pain which is left-sided, sharp, severe, on and off pain that is associated with shortness of breath, wheezing and orthopnea, no relief of the pain with morphine that she just received. She has had subjective fever, diaphoresis, cough productive of yellowish sputum and sometimes hemoptysis. She also complains about generalized abdominal pain, nausea and vomiting. She has had dysuria and hematuria which is not new. She has leg swelling involving the left more than right. No change in bowel habits.     Medical records have been reviewed and summarized. Vital signs are notable for respiratory rate of 20 and saturation of 98% on 2 L. She does not use oxygen at baseline. Labs shows troponin 0.18, creatinine 1.3, potassium 5.6, urinalysis with greater than 20 white cells, positive for leukocyte esterase and bacteria. Chest x-ray is negative. CT scan of the abdomen and pelvis shows thickened bladder wall, increase in size of adrenal mass with central necrosis, increased mediastinal lymphadenopathy, stable liver lesion, left lower lobe nodular opacity with tree-in-bud appearance, question bronchitis, bilateral insufficiency sacral fractures. CT scan of the chest shows an 8 x 4.5 x 3.5 cm left hilar mass  abutting the aortic arch and surrounding the left main pulmonary artery and partially occluding the left upper lobe pulmonary artery. This mass also surrounds the left main bronchus and occludes one of the left pulmonary veins. There is also an infiltrate seen with nodular density in the left lower lobe measuring 7 mm and another one measuring 3 mm in the right upper lobe. Mucus plugging in the right lower lobe and hypodense lesion in the left hepatic lobe concerning for metastases. EKG shows normal sinus rhythm rate of 67 with no acute ST-T wave changes.     She is being admitted for further management.         Hospital Course:   Ms. Alan Smart, a 59y.o. year old female  who has a past medical history of COPD (chronic obstructive pulmonary disease) ([address] Hyperlipidemia, Hypertension, PMB (postmenopausal bleeding), and Squamous cell carcinoma. During the course the patient's hospital stay Admitted for Acute respiratory failure with hypoxia and new oxygen requirement. She has metastatic disease in her lungs surrounding the left main bronchus. History of Metastatic cervical cancer. Worsening metastatic disease despite chemotherapy and radiation. Pulmonary, palliative, oncology were consulted. She was started on palliative radiation treatment  Urology was consulted  because she was saying she was having pain at kidney stent site - no intervention needed  steroids were tapered  Pulmonary - unable to perform routine bronchoscopy   Urine culture - 10-100K, mixed marnie   Stable at time of discharge to home. Consults:     IP CONSULT TO INTERNAL MEDICINE  IP CONSULT TO PHARMACY  IP CONSULT TO PULMONOLOGY  IP CONSULT TO ONCOLOGY  IP CONSULT TO PALLIATIVE CARE  IP CONSULT TO RADIATION ONCOLOGY  IP CONSULT TO HOSPICE  IP CONSULT TO UROLOGY    Significant Diagnostic Studies:  As above      Discharge Instructions/Follow-up:  As above       Activity: activity as tolerated    Physical Exam:  Vitals:    05/27/19 0740   BP: 112/63   Pulse: 104   Resp: 18   Temp: 98.8 °F (37.1 °C)   SpO2: 92%       General appearance:   middle-age female, chronically ill appearing and weak, in moderate respiratory distress, appears stated age and cooperative. Diaphoretic. Afebrile.   HEENT:  Normal cephalic, atraumatic without obvious deformity. Pupils equal, round, and reactive to light.  Extra ocular muscles intact. Conjunctivae/corneas clear. Neck: Supple, with full range of motion.  jugular venous distention. Trachea midline. Respiratory: coarse breath sounds  Cardiovascular:  Regular rate and rhythm with normal S1/S2 without murmurs, rubs or gallops.   Abdomen:Full, firm, diffusely tender  with normal bowel sounds. Right nephrostomy catheter in place.   Musculoskeletal:  No clubbing/cyanosis, 2+ edema right leg. limited  range of motion without deformity. Skin: Skin color, texture, turgor normal.  No rashes or lesions. Neurologic:  Neurovascularly intact without any focal sensory/motor deficits. Cranial nerves: II-XII intact, grossly non-focal.  Psychiatric:  Alert and oriented, thought content appropriate, normal insight  Capillary Refill: Brisk,< 3 seconds   Peripheral Pulses: +2 palpable, equal bilaterally         Labs: For convenience and continuity at follow-up the following most recent labs are provided:      CBC:    Lab Results   Component Value Date    WBC 4.3 05/27/2019    HGB 10.3 05/27/2019    HCT 33.4 05/27/2019     05/27/2019       Renal:    Lab Results   Component Value Date     05/26/2019    K 5.3 05/26/2019    CL 97 05/26/2019    CO2 24 05/26/2019    BUN 28 05/26/2019    CREATININE 1.3 05/26/2019    CALCIUM 8.8 05/26/2019       Imaging:  US DUP LOWER EXTREMITIES BILATERAL VENOUS   Final Result   No evidence for deep venous thrombosis               CTA CHEST W CONTRAST   Final Result   A large lobulated mass in the left hilum abutting the hilar   structures, the aorta and invading the left main pulmonary artery and   partially occluding the left upper lobe pulmonary artery and pulmonary   vein on the left side. Findings are concerning for malignancy either   primary or metastasis. There is possible metastatic involvement in the   right and left lungs,  adrenal metastasis and possibly liver   metastasis. No other intrinsic pulmonary embolism or identified. The findings were telephoned to the ED physician. Malay Justice ALERT:  CRITICAL RESULT                        CT ABDOMEN PELVIS W IV CONTRAST Additional Contrast? None   Final Result      1. THICK WALLED ENHANCING BLADDER WHICH COULD REFLECT CYSTITIS,   CORRELATION WITH URINALYSIS IS RECOMMENDED.    2.  INTERVAL INCREASE IN SIZE OF BILATERAL ADRENAL METASTASES WITH   INTERVAL DEVELOPMENT OF CENTRAL NECROSIS. 3.  INTERVAL ENLARGEMENT OF RETROPERITONEAL AND MESENTERIC LYMPH NODES   AS DESCRIBED. 4.  INDETERMINATE LOW-ATTENUATION LESION IN THE LIVER, IS UNCHANGED   FROM 12/15/2018. 5. MILD PROMINENCE OF THE INTRAHEPATIC BILIARY TREE WITHOUT FOCAL   OBSTRUCTION OR DILATION OF THE EXTRAHEPATIC BILIARY TREE, LIKELY   PHYSIOLOGIC. 6.  NODULAR OPACITIES IN THE LEFT LOWER LOBE IN A TREE-IN-BUD   DISTRIBUTION SUGGESTING INFECTIOUS BRONCHIOLITIS, THESE APPEAR TO BE   NEW FROM 4/29/2019. FOLLOW-UP IS RECOMMENDED TO ENSURE RESOLUTION. 7.  BILATERAL SACRAL INSUFFICIENCY FRACTURES      XR CHEST PORTABLE   Final Result      No airspace opacities or pleural effusion. Discharge Medications:     Current Discharge Medication List           Details   predniSONE (DELTASONE) 10 MG tablet 4 pills for 3 days, then 3 pills for 3 days, 2 pills for 3 days, then 1 pills for 3 days  Qty: 30 tablet, Refills: 0              Details   ibuprofen (ADVIL;MOTRIN) 800 MG tablet Take 1 tablet by mouth every 8 hours as needed for Pain  Qty: 30 tablet, Refills: 0      metoclopramide (REGLAN) 5 MG tablet Take 5 mg by mouth 4 times daily      HYDROcodone-acetaminophen (NORCO) 5-325 MG per tablet Take 1 tablet by mouth every 6 hours as needed for Pain. amLODIPine (NORVASC) 5 MG tablet Take 1 tablet by mouth daily  Qty: 30 tablet, Refills: 0      gabapentin (NEURONTIN) 600 MG tablet Take 1 tablet by mouth 3 times daily. .      sertraline (ZOLOFT) 50 MG tablet Take 50 mg by mouth daily      fentaNYL (DURAGESIC) 100 MCG/HR Place 1 patch onto the skin every 72 hours. .      methadone (DOLOPHINE) 10 MG tablet Take 10 mg by mouth every 8 hours as needed for Pain. Peters Challenger       oxybutynin (DITROPAN) 5 MG tablet Take 5 mg by mouth daily       docusate sodium (COLACE) 100 MG capsule Take 100 mg by mouth 2 times daily      opium-belladonna (B&O SUPPRETTES) 16.2-60 MG suppository Place 1 suppository rectally 2 times daily as needed for Pain for up to 7 days. Ashley Buddle: 5 suppository, Refills: 0    Associated Diagnoses: Lower abdominal pain             Time Spent on discharge is more than 31 min in the examination, evaluation, counseling and review of medications and discharge plan. SignedRandi Habermann, MD   5/27/2019      Thank you Khushi Nava MD for the opportunity to be involved in this patient's care. If you have any questions or concerns please feel free to contact me. NOTE: This report was transcribed using voice recognition software. Every effort was made to ensure accuracy; however, inadvertent computerized transcription errors may be present.

## 2019-05-28 LAB — CULTURE, BLOOD 2: NORMAL

## 2019-05-28 NOTE — PROGRESS NOTES
CLINICAL PHARMACY NOTE: MEDS TO [address] Select Patient?: No  Total # of Prescriptions Filled: 1   The following medications were delivered to the patient:  · Prednisone 10 mg   Total # of Interventions Completed: 2  Time Spent (min): 15    Additional Documentation:

## 2019-05-29 PROBLEM — N39.0 UTI (URINARY TRACT INFECTION): Status: RESOLVED | Noted: 2019-04-29 | Resolved: 2019-05-29

## 2019-05-30 ENCOUNTER — APPOINTMENT (OUTPATIENT)
Dept: CT IMAGING | Age: 59
End: 2019-05-30
Payer: OTHER GOVERNMENT

## 2019-05-30 ENCOUNTER — TELEPHONE (OUTPATIENT)
Dept: OTHER | Facility: CLINIC | Age: 59
End: 2019-05-30

## 2019-05-30 ENCOUNTER — HOSPITAL ENCOUNTER (EMERGENCY)
Age: 59
Discharge: HOME | End: 2019-05-30
Attending: EMERGENCY MEDICINE
Payer: OTHER GOVERNMENT

## 2019-05-30 VITALS
DIASTOLIC BLOOD PRESSURE: 66 MMHG | HEART RATE: 76 BPM | OXYGEN SATURATION: 97 % | RESPIRATION RATE: 18 BRPM | WEIGHT: 90 LBS | SYSTOLIC BLOOD PRESSURE: 121 MMHG | BODY MASS INDEX: 15.36 KG/M2 | HEIGHT: 64 IN | TEMPERATURE: 97.9 F

## 2019-05-30 DIAGNOSIS — R31.9 HEMATURIA, UNSPECIFIED TYPE: Primary | ICD-10-CM

## 2019-05-30 DIAGNOSIS — N18.9 CHRONIC KIDNEY DISEASE, UNSPECIFIED CKD STAGE: ICD-10-CM

## 2019-05-30 DIAGNOSIS — C79.9 METASTATIC DISEASE (HCC): ICD-10-CM

## 2019-05-30 DIAGNOSIS — Z93.6 NEPHROSTOMY STATUS (HCC): ICD-10-CM

## 2019-05-30 LAB
ALBUMIN SERPL-MCNC: 2.7 G/DL (ref 3.5–5.2)
ALP BLD-CCNC: 81 U/L (ref 35–104)
ALT SERPL-CCNC: 7 U/L (ref 0–32)
ANION GAP SERPL CALCULATED.3IONS-SCNC: 11 MMOL/L (ref 7–16)
AST SERPL-CCNC: 10 U/L (ref 0–31)
BACTERIA: ABNORMAL /HPF
BASOPHILS ABSOLUTE: 0.02 E9/L (ref 0–0.2)
BASOPHILS RELATIVE PERCENT: 0.2 % (ref 0–2)
BILIRUB SERPL-MCNC: <0.2 MG/DL (ref 0–1.2)
BILIRUBIN URINE: NEGATIVE
BLOOD, URINE: ABNORMAL
BUN BLDV-MCNC: 33 MG/DL (ref 8–23)
CALCIUM SERPL-MCNC: 8.8 MG/DL (ref 8.6–10.2)
CHLORIDE BLD-SCNC: 100 MMOL/L (ref 98–107)
CLARITY: ABNORMAL
CO2: 23 MMOL/L (ref 22–29)
COLOR: YELLOW
CREAT SERPL-MCNC: 1.5 MG/DL (ref 0.5–1)
EOSINOPHILS ABSOLUTE: 0.01 E9/L (ref 0.05–0.5)
EOSINOPHILS RELATIVE PERCENT: 0.1 % (ref 0–6)
EPITHELIAL CELLS, UA: ABNORMAL /HPF
GFR AFRICAN AMERICAN: 43
GFR NON-AFRICAN AMERICAN: 35 ML/MIN/1.73
GLUCOSE BLD-MCNC: 111 MG/DL (ref 74–99)
GLUCOSE URINE: NEGATIVE MG/DL
HCT VFR BLD CALC: 28.5 % (ref 34–48)
HEMOGLOBIN: 9.3 G/DL (ref 11.5–15.5)
IMMATURE GRANULOCYTES #: 0.09 E9/L
IMMATURE GRANULOCYTES %: 1.1 % (ref 0–5)
KETONES, URINE: NEGATIVE MG/DL
LACTIC ACID, SEPSIS: 1.7 MMOL/L (ref 0.5–1.9)
LEUKOCYTE ESTERASE, URINE: ABNORMAL
LYMPHOCYTES ABSOLUTE: 0.31 E9/L (ref 1.5–4)
LYMPHOCYTES RELATIVE PERCENT: 3.7 % (ref 20–42)
MCH RBC QN AUTO: 30.2 PG (ref 26–35)
MCHC RBC AUTO-ENTMCNC: 32.6 % (ref 32–34.5)
MCV RBC AUTO: 92.5 FL (ref 80–99.9)
MONOCYTES ABSOLUTE: 0.66 E9/L (ref 0.1–0.95)
MONOCYTES RELATIVE PERCENT: 7.9 % (ref 2–12)
NEUTROPHILS ABSOLUTE: 7.23 E9/L (ref 1.8–7.3)
NEUTROPHILS RELATIVE PERCENT: 87 % (ref 43–80)
NITRITE, URINE: NEGATIVE
PDW BLD-RTO: 15.6 FL (ref 11.5–15)
PH UA: 6.5 (ref 5–9)
PLATELET # BLD: 297 E9/L (ref 130–450)
PMV BLD AUTO: 9.1 FL (ref 7–12)
POTASSIUM SERPL-SCNC: 4.7 MMOL/L (ref 3.5–5)
PROTEIN UA: 100 MG/DL
RBC # BLD: 3.08 E12/L (ref 3.5–5.5)
RBC # BLD: NORMAL 10*6/UL
RBC UA: >20 /HPF (ref 0–2)
SODIUM BLD-SCNC: 134 MMOL/L (ref 132–146)
SPECIFIC GRAVITY UA: 1.02 (ref 1–1.03)
TOTAL PROTEIN: 7.5 G/DL (ref 6.4–8.3)
UROBILINOGEN, URINE: 0.2 E.U./DL
WBC # BLD: 8.3 E9/L (ref 4.5–11.5)
WBC UA: ABNORMAL /HPF (ref 0–5)

## 2019-05-30 PROCEDURE — 74176 CT ABD & PELVIS W/O CONTRAST: CPT

## 2019-05-30 PROCEDURE — 96374 THER/PROPH/DIAG INJ IV PUSH: CPT

## 2019-05-30 PROCEDURE — 6370000000 HC RX 637 (ALT 250 FOR IP): Performed by: EMERGENCY MEDICINE

## 2019-05-30 PROCEDURE — 87088 URINE BACTERIA CULTURE: CPT

## 2019-05-30 PROCEDURE — 36415 COLL VENOUS BLD VENIPUNCTURE: CPT

## 2019-05-30 PROCEDURE — 96375 TX/PRO/DX INJ NEW DRUG ADDON: CPT

## 2019-05-30 PROCEDURE — 6360000002 HC RX W HCPCS: Performed by: EMERGENCY MEDICINE

## 2019-05-30 PROCEDURE — 85025 COMPLETE CBC W/AUTO DIFF WBC: CPT

## 2019-05-30 PROCEDURE — 81001 URINALYSIS AUTO W/SCOPE: CPT

## 2019-05-30 PROCEDURE — 80053 COMPREHEN METABOLIC PANEL: CPT

## 2019-05-30 PROCEDURE — 96376 TX/PRO/DX INJ SAME DRUG ADON: CPT

## 2019-05-30 PROCEDURE — 99284 EMERGENCY DEPT VISIT MOD MDM: CPT

## 2019-05-30 PROCEDURE — 2580000003 HC RX 258: Performed by: EMERGENCY MEDICINE

## 2019-05-30 PROCEDURE — 83605 ASSAY OF LACTIC ACID: CPT

## 2019-05-30 RX ORDER — 0.9 % SODIUM CHLORIDE 0.9 %
1000 INTRAVENOUS SOLUTION INTRAVENOUS ONCE
Status: COMPLETED | OUTPATIENT
Start: 2019-05-30 | End: 2019-05-30

## 2019-05-30 RX ORDER — HYDROCODONE BITARTRATE AND ACETAMINOPHEN 5; 325 MG/1; MG/1
1 TABLET ORAL ONCE
Status: COMPLETED | OUTPATIENT
Start: 2019-05-30 | End: 2019-05-30

## 2019-05-30 RX ORDER — ONDANSETRON 2 MG/ML
4 INJECTION INTRAMUSCULAR; INTRAVENOUS ONCE
Status: COMPLETED | OUTPATIENT
Start: 2019-05-30 | End: 2019-05-30

## 2019-05-30 RX ORDER — FENTANYL CITRATE 50 UG/ML
25 INJECTION, SOLUTION INTRAMUSCULAR; INTRAVENOUS ONCE
Status: COMPLETED | OUTPATIENT
Start: 2019-05-30 | End: 2019-05-30

## 2019-05-30 RX ADMIN — HYDROCODONE BITARTRATE AND ACETAMINOPHEN 1 TABLET: 5; 325 TABLET ORAL at 15:38

## 2019-05-30 RX ADMIN — ONDANSETRON HYDROCHLORIDE 4 MG: 2 SOLUTION INTRAMUSCULAR; INTRAVENOUS at 12:25

## 2019-05-30 RX ADMIN — SODIUM CHLORIDE 1000 ML: 9 INJECTION, SOLUTION INTRAVENOUS at 12:25

## 2019-05-30 RX ADMIN — ONDANSETRON HYDROCHLORIDE 4 MG: 2 SOLUTION INTRAMUSCULAR; INTRAVENOUS at 14:01

## 2019-05-30 RX ADMIN — FENTANYL CITRATE 25 MCG: 50 INJECTION, SOLUTION INTRAMUSCULAR; INTRAVENOUS at 13:56

## 2019-05-30 RX ADMIN — FENTANYL CITRATE 25 MCG: 50 INJECTION, SOLUTION INTRAMUSCULAR; INTRAVENOUS at 12:25

## 2019-05-30 ASSESSMENT — PAIN SCALES - GENERAL
PAINLEVEL_OUTOF10: 10

## 2019-05-30 ASSESSMENT — PAIN DESCRIPTION - PAIN TYPE: TYPE: ACUTE PAIN

## 2019-05-30 ASSESSMENT — PAIN DESCRIPTION - LOCATION: LOCATION: ABDOMEN;BACK

## 2019-05-30 NOTE — ED PROVIDER NOTES
Department of Emergency Medicine   ED  Provider Note  ED Room: 15/15   HPI:  5/30/19, Time: 12:00 PM     Yanira Morrison is a 59 y.o. female presenting to the ED for hematuria, beginning this morning. The complaint has been constant, moderate in severity, and worsened by nothing. Pt hx provided by the pt. Pt has a hx of stage 4 uterine CA. She has a nephrostomy tube in place and noticed blood in the nephrostomy bag this morning. She is also having diffuse abdominal pain with radiating pain to her back, and nausea. Pt denies any CP, SOB, HA, fever, chills, neck pain, numbness/tingling in the extremities, or any other symptoms at this time. ROS:   Pertinent positives and negatives are stated within HPI, all other systems reviewed and are negative.    --------------------------------------------- PAST HISTORY ---------------------------------------------  Past Medical History:  has a past medical history of COPD (chronic obstructive pulmonary disease) ([facility] Hyperlipidemia, Hypertension, PMB (postmenopausal bleeding), and Squamous cell carcinoma. Past Surgical History:  has a past surgical history that includes Ureter stent placement; Tonsillectomy; Mouth surgery; Dilation and curettage of uterus; hernia repair; laparoscopy; other surgical history (06/27/2017); other surgical history (N/A, 08/04/2017); Hip fracture surgery (Right, 3/4/2019); Colonoscopy (N/A, 3/13/2019); joint replacement (Right); Nephrostomy (Right); Appendectomy; and Tonsillectomy. Social History:  reports that she has been smoking cigarettes. She has a 10.00 pack-year smoking history. She has never used smokeless tobacco. She reports that she has current or past drug history. Drug: Marijuana. She reports that she does not drink alcohol. Family History: family history is not on file. The patients home medications have been reviewed. Allergies: Latex; Asa [aspirin];  Other; and Contrast [barium-containing compounds]    -----------------------------------------PHYSICAL EXAM------------------------------------------------  Constitutional/General: Alert and oriented x3, well appearing, non toxic  Head: NC/AT  Eyes: PERRL, EOMI  Mouth: Oropharynx clear, handling secretions  Neck: Supple, full ROM  Pulmonary: Coarse breath sounds bilaterally. Not in respiratory distress. Cardiovascular:  Regular rate and rhythm, no murmurs, gallops, or rubs. 2+ distal pulses. Abdomen: Soft, non distended, diffuse tenderness with mild guarding, no rebound tenderness  Back: Right nephrostomy tube clean, dry, and intact with one suture. Dark serosanguineous fluid preset in nephrostomy bag. No midline spine tenderness. Extremities: Moves all extremities x 4. Warm and well perfused. Skin: warm and dry without rash.    Neurologic: GCS 15, CN 2-12 grossly intact, no focal deficits, no meningeal signs  Psych: Normal Affect.    -------------------------------------------------- RESULTS -------------------------------------------------  All laboratory and radiology results have been personally reviewed by myself   LABS:  Results for orders placed or performed during the hospital encounter of 05/30/19   Comprehensive Metabolic Panel   Result Value Ref Range    Sodium 134 132 - 146 mmol/L    Potassium 4.7 3.5 - 5.0 mmol/L    Chloride 100 98 - 107 mmol/L    CO2 23 22 - 29 mmol/L    Anion Gap 11 7 - 16 mmol/L    Glucose 111 (H) 74 - 99 mg/dL    BUN 33 (H) 8 - 23 mg/dL    CREATININE 1.5 (H) 0.5 - 1.0 mg/dL    GFR Non-African American 35 >=60 mL/min/1.73    GFR African American 43     Calcium 8.8 8.6 - 10.2 mg/dL    Total Protein 7.5 6.4 - 8.3 g/dL    Alb 2.7 (L) 3.5 - 5.2 g/dL    Total Bilirubin <0.2 0.0 - 1.2 mg/dL    Alkaline Phosphatase 81 35 - 104 U/L    ALT 7 0 - 32 U/L    AST 10 0 - 31 U/L   CBC Auto Differential   Result Value Ref Range    WBC 8.3 4.5 - 11.5 E9/L    RBC 3.08 (L) 3.50 - 5.50 E12/L    Hemoglobin 9.3 (L) 11.5 - 15.5 g/dL Hematocrit 28.5 (L) 34.0 - 48.0 %    MCV 92.5 80.0 - 99.9 fL    MCH 30.2 26.0 - 35.0 pg    MCHC 32.6 32.0 - 34.5 %    RDW 15.6 (H) 11.5 - 15.0 fL    Platelets 181 083 - 145 E9/L    MPV 9.1 7.0 - 12.0 fL    Neutrophils % 87.0 (H) 43.0 - 80.0 %    Immature Granulocytes % 1.1 0.0 - 5.0 %    Lymphocytes % 3.7 (L) 20.0 - 42.0 %    Monocytes % 7.9 2.0 - 12.0 %    Eosinophils % 0.1 0.0 - 6.0 %    Basophils % 0.2 0.0 - 2.0 %    Neutrophils # 7.23 1.80 - 7.30 E9/L    Immature Granulocytes # 0.09 E9/L    Lymphocytes # 0.31 (L) 1.50 - 4.00 E9/L    Monocytes # 0.66 0.10 - 0.95 E9/L    Eosinophils # 0.01 (L) 0.05 - 0.50 E9/L    Basophils # 0.02 0.00 - 0.20 E9/L    RBC Morphology Normal    Urinalysis   Result Value Ref Range    Color, UA Yellow Straw/Yellow    Clarity, UA SL CLOUDY Clear    Glucose, Ur Negative Negative mg/dL    Bilirubin Urine Negative Negative    Ketones, Urine Negative Negative mg/dL    Specific Gravity, UA 1.025 1.005 - 1.030    Blood, Urine LARGE (A) Negative    pH, UA 6.5 5.0 - 9.0    Protein,  (A) Negative mg/dL    Urobilinogen, Urine 0.2 <2.0 E.U./dL    Nitrite, Urine Negative Negative    Leukocyte Esterase, Urine MODERATE (A) Negative   Lactate, Sepsis   Result Value Ref Range    Lactic Acid, Sepsis 1.7 0.5 - 1.9 mmol/L   Microscopic Urinalysis   Result Value Ref Range    WBC, UA 5-10 0 - 5 /HPF    RBC, UA >20 0 - 2 /HPF    Epi Cells FEW /HPF    Bacteria, UA FEW (A) /HPF       RADIOLOGY:  Interpreted by Radiologist.  CT ABDOMEN PELVIS WO CONTRAST Additional Contrast? None   Final Result   1. The left inferior bladder wall remains thickened, and could   certainly be a source of hematuria as cystitis or even metastatic   disease. The kidneys are unchanged in appearance with bilateral double   pigtail ureteral stents and a right nephrostomy catheter. 2. Bilateral adrenal enlargement without significant interval change.    Findings are consistent with necrotic metastatic disease, and are   stable or may be very minimally decreased in size. 3. Small bowel mesenteric adenopathy in the left abdomen and   retroperitoneal adenopathy along the left periaortic distribution are   again documented. 4. The previously identified left lateral segment liver finding is   stable, and there is no new or progressive biliary dilation. 5. Previously documented nodularity in the left lower lung has   resolved. 6. Lower sacral fractures are stable.                            ------------------------- NURSING NOTES AND VITALS REVIEWED ---------------------------  The nursing notes within the ED encounter and vital signs as below have been reviewed. /66   Pulse 76   Temp 97.9 °F (36.6 °C) (Temporal)   Resp 18   Ht 5' 4\" (1.626 m)   Wt 90 lb (40.8 kg)   SpO2 97%   BMI 15.45 kg/m²   Oxygen Saturation Interpretation: Normal    ------------------------------- ED COURSE/MEDICAL DECISION MAKING----------------------  Medications   fentaNYL (SUBLIMAZE) injection 25 mcg (25 mcg Intravenous Given 5/30/19 1225)   ondansetron (ZOFRAN) injection 4 mg (4 mg Intravenous Given 5/30/19 1225)   0.9 % sodium chloride bolus (0 mLs Intravenous Stopped 5/30/19 1407)   fentaNYL (SUBLIMAZE) injection 25 mcg (25 mcg Intravenous Given 5/30/19 1356)   ondansetron (ZOFRAN) injection 4 mg (4 mg Intravenous Given 5/30/19 1401)   HYDROcodone-acetaminophen (NORCO) 5-325 MG per tablet 1 tablet (1 tablet Oral Given 5/30/19 1538)       Medical Decision Making:   Pt presents with dark serosanguineous fluid in her nephrostomy bag. Pt noticed the blood in her nephrostomy bag this morning. She is having associated diffuse abdominal pain and nausea. IV fluids, Zofran, ad fentanyl given. CT A&P and labs obtained. Pt placed on telemetry monitoring. Labwork shows stable chronic kidney disease. No leukocytosis or fever to suggest acute infection at this time.  CT the abdomen and pelvis shows known metastatic disease without any significant interval

## 2019-05-30 NOTE — ED NOTES
Bed: 15  Expected date:   Expected time:   Means of arrival:   Comments:  ems     Cezar Bradshaw RN  05/30/19 [id number]

## 2019-05-30 NOTE — TELEPHONE ENCOUNTER
Writer contacted Mirna, ED provider to inform of 30 day readmission risk. ED provider informed writer admit or discharge has not been determined. Continue to follow-up.

## 2019-06-01 LAB — URINE CULTURE, ROUTINE: NORMAL

## 2019-06-03 ENCOUNTER — TELEPHONE (OUTPATIENT)
Dept: CASE MANAGEMENT | Age: 59
End: 2019-06-03

## 2019-06-03 NOTE — TELEPHONE ENCOUNTER
Patient has not shown up for radiation treatment since 5/24. Called patient and phone just rang with no answer and no voice mail. Called patient's sister Jennifer Crandall and she stated that the patient was at home next door. She walked the phone over to the patient's house. I spoke with the patient and she states she hasn't been coming because she hasn't felt well and it even hurts to walk. Asked if she was agreeable to come in on Wednesday 6/5 at 0900 for an office visit with Dr Maya Rios. She stated if she didn't have any other appointments and if she was feeling ok. She then hung up the phone. Will update Radiation Therapists and Dr Maya Rios.

## 2019-06-04 RX ORDER — SODIUM CHLORIDE 0.9 % (FLUSH) 0.9 %
10 SYRINGE (ML) INJECTION PRN
Status: CANCELLED | OUTPATIENT
Start: 2019-06-04

## 2019-06-11 ENCOUNTER — TELEPHONE (OUTPATIENT)
Dept: CASE MANAGEMENT | Age: 59
End: 2019-06-11

## 2019-06-21 PROBLEM — R79.89 ELEVATED TROPONIN: Status: RESOLVED | Noted: 2019-05-22 | Resolved: 2019-06-21

## 2019-06-21 PROBLEM — R77.8 ELEVATED TROPONIN: Status: RESOLVED | Noted: 2019-05-22 | Resolved: 2019-06-21

## 2019-07-10 DIAGNOSIS — S72.001A DISPLACED FRACTURE OF RIGHT FEMORAL NECK (HCC): Primary | ICD-10-CM
